# Patient Record
Sex: FEMALE | Race: WHITE | Employment: UNEMPLOYED | URBAN - METROPOLITAN AREA
[De-identification: names, ages, dates, MRNs, and addresses within clinical notes are randomized per-mention and may not be internally consistent; named-entity substitution may affect disease eponyms.]

---

## 2017-01-06 ENCOUNTER — TELEPHONE (OUTPATIENT)
Dept: PULMONOLOGY | Age: 52
End: 2017-01-06

## 2017-01-12 ENCOUNTER — HOSPITAL ENCOUNTER (OUTPATIENT)
Dept: NON INVASIVE DIAGNOSTICS | Age: 52
Discharge: HOME | End: 2017-01-12
Attending: FAMILY MEDICINE | Admitting: FAMILY MEDICINE

## 2017-01-12 DIAGNOSIS — R60.0 LOCALIZED EDEMA: ICD-10-CM

## 2017-01-12 LAB
LV EF: 58 %
LVEF MODALITY: NORMAL

## 2017-02-01 RX ORDER — ALBUTEROL SULFATE 2.5 MG/3ML
SOLUTION RESPIRATORY (INHALATION)
Refills: 0 | OUTPATIENT
Start: 2017-02-01

## 2017-02-03 RX ORDER — ALBUTEROL SULFATE 2.5 MG/3ML
SOLUTION RESPIRATORY (INHALATION)
Qty: 375 ML | Refills: 5 | Status: SHIPPED | OUTPATIENT
Start: 2017-02-03 | End: 2017-06-08

## 2017-03-08 ENCOUNTER — TELEPHONE (OUTPATIENT)
Dept: PULMONOLOGY | Age: 52
End: 2017-03-08

## 2017-04-05 ENCOUNTER — TELEPHONE (OUTPATIENT)
Dept: PULMONOLOGY | Age: 52
End: 2017-04-05

## 2017-04-05 DIAGNOSIS — J44.9 CHRONIC OBSTRUCTIVE PULMONARY DISEASE, UNSPECIFIED COPD TYPE (HCC): Primary | ICD-10-CM

## 2017-06-08 ENCOUNTER — OFFICE VISIT (OUTPATIENT)
Dept: PULMONOLOGY | Age: 52
End: 2017-06-08

## 2017-06-08 VITALS
TEMPERATURE: 98 F | RESPIRATION RATE: 16 BRPM | BODY MASS INDEX: 37.89 KG/M2 | OXYGEN SATURATION: 96 % | DIASTOLIC BLOOD PRESSURE: 68 MMHG | SYSTOLIC BLOOD PRESSURE: 124 MMHG | HEART RATE: 59 BPM | HEIGHT: 67 IN | WEIGHT: 241.4 LBS

## 2017-06-08 DIAGNOSIS — Z87.891 FORMER SMOKER: ICD-10-CM

## 2017-06-08 DIAGNOSIS — J44.9 MODERATE COPD (CHRONIC OBSTRUCTIVE PULMONARY DISEASE) (HCC): Primary | ICD-10-CM

## 2017-06-08 PROCEDURE — 99214 OFFICE O/P EST MOD 30 MIN: CPT | Performed by: INTERNAL MEDICINE

## 2017-06-08 RX ORDER — ALBUTEROL SULFATE 2.5 MG/3ML
2.5 SOLUTION RESPIRATORY (INHALATION) EVERY 6 HOURS PRN
Qty: 120 VIAL | Refills: 6 | Status: SHIPPED | OUTPATIENT
Start: 2017-06-08 | End: 2017-12-14 | Stop reason: SDUPTHER

## 2017-06-08 RX ORDER — BUDESONIDE AND FORMOTEROL FUMARATE DIHYDRATE 160; 4.5 UG/1; UG/1
AEROSOL RESPIRATORY (INHALATION)
Qty: 1 INHALER | Refills: 6 | Status: SHIPPED | OUTPATIENT
Start: 2017-06-08 | End: 2017-12-14 | Stop reason: SDUPTHER

## 2017-06-08 RX ORDER — ALBUTEROL SULFATE 90 UG/1
AEROSOL, METERED RESPIRATORY (INHALATION)
Qty: 1 INHALER | Refills: 6 | Status: SHIPPED | OUTPATIENT
Start: 2017-06-08 | End: 2018-01-30 | Stop reason: SDUPTHER

## 2017-06-08 RX ORDER — FLUTICASONE PROPIONATE 50 MCG
SPRAY, SUSPENSION (ML) NASAL
Qty: 1 BOTTLE | Refills: 6 | Status: SHIPPED | OUTPATIENT
Start: 2017-06-08 | End: 2017-11-01

## 2017-06-08 RX ORDER — MONTELUKAST SODIUM 10 MG/1
TABLET ORAL
Qty: 30 TABLET | Refills: 6 | Status: SHIPPED | OUTPATIENT
Start: 2017-06-08 | End: 2017-12-14 | Stop reason: SDUPTHER

## 2017-06-08 RX ORDER — LORATADINE 10 MG/1
10 TABLET ORAL DAILY
COMMUNITY
Start: 2017-06-03 | End: 2019-01-10 | Stop reason: SDUPTHER

## 2017-10-04 RX ORDER — ALBUTEROL SULFATE 2.5 MG/3ML
SOLUTION RESPIRATORY (INHALATION)
Qty: 275 ML | Refills: 1 | Status: SHIPPED | OUTPATIENT
Start: 2017-10-04 | End: 2017-12-03 | Stop reason: SDUPTHER

## 2017-11-01 RX ORDER — FLUTICASONE PROPIONATE 50 MCG
SPRAY, SUSPENSION (ML) NASAL
Qty: 1 BOTTLE | Refills: 5 | Status: SHIPPED | OUTPATIENT
Start: 2017-11-01 | End: 2017-12-14

## 2017-12-03 DIAGNOSIS — J44.9 CHRONIC OBSTRUCTIVE PULMONARY DISEASE, UNSPECIFIED COPD TYPE (HCC): Primary | ICD-10-CM

## 2017-12-04 RX ORDER — ALBUTEROL SULFATE 2.5 MG/3ML
SOLUTION RESPIRATORY (INHALATION)
Qty: 360 ML | Refills: 1 | Status: SHIPPED | OUTPATIENT
Start: 2017-12-04 | End: 2017-12-14 | Stop reason: SDUPTHER

## 2017-12-05 ENCOUNTER — HOSPITAL ENCOUNTER (OUTPATIENT)
Dept: PULMONOLOGY | Age: 52
Discharge: HOME | End: 2017-12-05
Attending: INTERNAL MEDICINE | Admitting: INTERNAL MEDICINE

## 2017-12-05 VITALS — OXYGEN SATURATION: 96 %

## 2017-12-05 DIAGNOSIS — J44.9 CHRONIC OBSTRUCTIVE PULMONARY DISEASE (HCC): ICD-10-CM

## 2017-12-05 RX ORDER — ALBUTEROL SULFATE 90 UG/1
4 AEROSOL, METERED RESPIRATORY (INHALATION) ONCE
Status: COMPLETED | OUTPATIENT
Start: 2017-12-05 | End: 2017-12-05

## 2017-12-05 RX ADMIN — ALBUTEROL SULFATE 4 PUFF: 90 AEROSOL, METERED RESPIRATORY (INHALATION) at 12:50

## 2017-12-14 ENCOUNTER — OFFICE VISIT (OUTPATIENT)
Dept: PULMONOLOGY | Age: 52
End: 2017-12-14

## 2017-12-14 VITALS
HEART RATE: 62 BPM | SYSTOLIC BLOOD PRESSURE: 108 MMHG | BODY MASS INDEX: 38.52 KG/M2 | HEIGHT: 67 IN | TEMPERATURE: 97.9 F | OXYGEN SATURATION: 96 % | DIASTOLIC BLOOD PRESSURE: 60 MMHG | RESPIRATION RATE: 20 BRPM | WEIGHT: 245.4 LBS

## 2017-12-14 DIAGNOSIS — J44.9 CHRONIC OBSTRUCTIVE PULMONARY DISEASE, UNSPECIFIED COPD TYPE (HCC): Primary | ICD-10-CM

## 2017-12-14 DIAGNOSIS — J45.909 UNCOMPLICATED ASTHMA, UNSPECIFIED ASTHMA SEVERITY, UNSPECIFIED WHETHER PERSISTENT: ICD-10-CM

## 2017-12-14 DIAGNOSIS — J30.9 ALLERGIC RHINITIS, UNSPECIFIED CHRONICITY, UNSPECIFIED SEASONALITY, UNSPECIFIED TRIGGER: ICD-10-CM

## 2017-12-14 PROCEDURE — 99214 OFFICE O/P EST MOD 30 MIN: CPT | Performed by: INTERNAL MEDICINE

## 2017-12-14 PROCEDURE — G8926 SPIRO NO PERF OR DOC: HCPCS | Performed by: INTERNAL MEDICINE

## 2017-12-14 PROCEDURE — 3017F COLORECTAL CA SCREEN DOC REV: CPT | Performed by: INTERNAL MEDICINE

## 2017-12-14 PROCEDURE — 3023F SPIROM DOC REV: CPT | Performed by: INTERNAL MEDICINE

## 2017-12-14 PROCEDURE — 1036F TOBACCO NON-USER: CPT | Performed by: INTERNAL MEDICINE

## 2017-12-14 PROCEDURE — G8417 CALC BMI ABV UP PARAM F/U: HCPCS | Performed by: INTERNAL MEDICINE

## 2017-12-14 PROCEDURE — G8427 DOCREV CUR MEDS BY ELIG CLIN: HCPCS | Performed by: INTERNAL MEDICINE

## 2017-12-14 PROCEDURE — G8484 FLU IMMUNIZE NO ADMIN: HCPCS | Performed by: INTERNAL MEDICINE

## 2017-12-14 RX ORDER — MONTELUKAST SODIUM 10 MG/1
TABLET ORAL
Qty: 30 TABLET | Refills: 6 | Status: SHIPPED | OUTPATIENT
Start: 2017-12-14 | End: 2018-01-30 | Stop reason: SDUPTHER

## 2017-12-14 RX ORDER — FLUTICASONE PROPIONATE 50 MCG
2 SPRAY, SUSPENSION (ML) NASAL DAILY
Qty: 1 BOTTLE | Refills: 5 | Status: SHIPPED | OUTPATIENT
Start: 2017-12-14 | End: 2018-04-28 | Stop reason: SDUPTHER

## 2017-12-14 RX ORDER — BUDESONIDE AND FORMOTEROL FUMARATE DIHYDRATE 160; 4.5 UG/1; UG/1
AEROSOL RESPIRATORY (INHALATION)
Qty: 1 INHALER | Refills: 6 | Status: SHIPPED | OUTPATIENT
Start: 2017-12-14 | End: 2018-01-30 | Stop reason: SDUPTHER

## 2017-12-14 RX ORDER — CHOLECALCIFEROL (VITAMIN D3) 1250 MCG
CAPSULE ORAL
COMMUNITY
End: 2018-06-27

## 2017-12-14 RX ORDER — ALBUTEROL SULFATE 2.5 MG/3ML
2.5 SOLUTION RESPIRATORY (INHALATION) EVERY 6 HOURS PRN
Qty: 120 VIAL | Refills: 6 | Status: SHIPPED | OUTPATIENT
Start: 2017-12-14 | End: 2018-01-30 | Stop reason: SDUPTHER

## 2017-12-14 NOTE — PATIENT INSTRUCTIONS
Please keep all of your future appointments scheduled by [address], [facility] Pulmonary office.

## 2018-01-29 RX ORDER — BUDESONIDE AND FORMOTEROL FUMARATE DIHYDRATE 160; 4.5 UG/1; UG/1
AEROSOL RESPIRATORY (INHALATION)
Qty: 10.2 G | Refills: 5 | OUTPATIENT
Start: 2018-01-29

## 2018-01-30 RX ORDER — MONTELUKAST SODIUM 10 MG/1
TABLET ORAL
Qty: 30 TABLET | Refills: 5 | Status: SHIPPED | OUTPATIENT
Start: 2018-01-30 | End: 2018-06-27 | Stop reason: SDUPTHER

## 2018-01-30 RX ORDER — ALBUTEROL SULFATE 2.5 MG/3ML
2.5 SOLUTION RESPIRATORY (INHALATION) EVERY 6 HOURS PRN
Qty: 125 VIAL | Refills: 5 | Status: SHIPPED | OUTPATIENT
Start: 2018-01-30 | End: 2018-06-27 | Stop reason: SDUPTHER

## 2018-01-30 RX ORDER — BUDESONIDE AND FORMOTEROL FUMARATE DIHYDRATE 160; 4.5 UG/1; UG/1
AEROSOL RESPIRATORY (INHALATION)
Qty: 1 INHALER | Refills: 5 | Status: SHIPPED | OUTPATIENT
Start: 2018-01-30 | End: 2018-06-27 | Stop reason: SDUPTHER

## 2018-01-30 RX ORDER — ALBUTEROL SULFATE 90 UG/1
AEROSOL, METERED RESPIRATORY (INHALATION)
Qty: 1 INHALER | Refills: 5 | Status: SHIPPED | OUTPATIENT
Start: 2018-01-30 | End: 2018-06-27 | Stop reason: SDUPTHER

## 2018-03-01 RX ORDER — BUDESONIDE AND FORMOTEROL FUMARATE DIHYDRATE 160; 4.5 UG/1; UG/1
AEROSOL RESPIRATORY (INHALATION)
Qty: 10.2 G | Refills: 0 | OUTPATIENT
Start: 2018-03-01

## 2018-04-30 RX ORDER — FLUTICASONE PROPIONATE 50 MCG
SPRAY, SUSPENSION (ML) NASAL
Qty: 16 G | Refills: 2 | Status: SHIPPED | OUTPATIENT
Start: 2018-04-30 | End: 2018-06-27 | Stop reason: SDUPTHER

## 2018-06-27 ENCOUNTER — HOSPITAL ENCOUNTER (OUTPATIENT)
Dept: OTHER | Age: 53
Discharge: HOME | End: 2018-06-27
Attending: INTERNAL MEDICINE | Admitting: INTERNAL MEDICINE

## 2018-06-27 ENCOUNTER — OFFICE VISIT (OUTPATIENT)
Dept: PULMONOLOGY | Age: 53
End: 2018-06-27

## 2018-06-27 VITALS
RESPIRATION RATE: 18 BRPM | WEIGHT: 244 LBS | HEIGHT: 67 IN | OXYGEN SATURATION: 96 % | DIASTOLIC BLOOD PRESSURE: 76 MMHG | SYSTOLIC BLOOD PRESSURE: 126 MMHG | HEART RATE: 54 BPM | TEMPERATURE: 97.7 F | BODY MASS INDEX: 38.3 KG/M2

## 2018-06-27 DIAGNOSIS — J44.9 CHRONIC OBSTRUCTIVE PULMONARY DISEASE, UNSPECIFIED COPD TYPE (HCC): Primary | ICD-10-CM

## 2018-06-27 DIAGNOSIS — J30.9 ALLERGIC RHINITIS, UNSPECIFIED CHRONICITY, UNSPECIFIED SEASONALITY, UNSPECIFIED TRIGGER: ICD-10-CM

## 2018-06-27 DIAGNOSIS — J45.909 ASTHMA, UNSPECIFIED ASTHMA SEVERITY, UNSPECIFIED WHETHER COMPLICATED, UNSPECIFIED WHETHER PERSISTENT: ICD-10-CM

## 2018-06-27 DIAGNOSIS — J44.9 CHRONIC OBSTRUCTIVE PULMONARY DISEASE, UNSPECIFIED COPD TYPE (HCC): ICD-10-CM

## 2018-06-27 PROCEDURE — 99214 OFFICE O/P EST MOD 30 MIN: CPT | Performed by: INTERNAL MEDICINE

## 2018-06-27 PROCEDURE — 1036F TOBACCO NON-USER: CPT | Performed by: INTERNAL MEDICINE

## 2018-06-27 PROCEDURE — 3023F SPIROM DOC REV: CPT | Performed by: INTERNAL MEDICINE

## 2018-06-27 PROCEDURE — G8417 CALC BMI ABV UP PARAM F/U: HCPCS | Performed by: INTERNAL MEDICINE

## 2018-06-27 PROCEDURE — G8926 SPIRO NO PERF OR DOC: HCPCS | Performed by: INTERNAL MEDICINE

## 2018-06-27 PROCEDURE — G8427 DOCREV CUR MEDS BY ELIG CLIN: HCPCS | Performed by: INTERNAL MEDICINE

## 2018-06-27 PROCEDURE — 3017F COLORECTAL CA SCREEN DOC REV: CPT | Performed by: INTERNAL MEDICINE

## 2018-06-27 RX ORDER — AZITHROMYCIN 250 MG/1
TABLET, FILM COATED ORAL
Qty: 1 PACKET | Refills: 0 | Status: SHIPPED | OUTPATIENT
Start: 2018-06-27 | End: 2018-07-01

## 2018-06-27 RX ORDER — RANITIDINE 150 MG/1
TABLET ORAL
Refills: 3 | COMMUNITY
Start: 2018-05-28 | End: 2020-09-22

## 2018-06-27 RX ORDER — ALBUTEROL SULFATE 2.5 MG/3ML
2.5 SOLUTION RESPIRATORY (INHALATION) EVERY 6 HOURS PRN
Qty: 125 VIAL | Refills: 6 | Status: SHIPPED | OUTPATIENT
Start: 2018-06-27 | End: 2019-03-30 | Stop reason: SDUPTHER

## 2018-06-27 RX ORDER — BUDESONIDE AND FORMOTEROL FUMARATE DIHYDRATE 160; 4.5 UG/1; UG/1
AEROSOL RESPIRATORY (INHALATION)
Qty: 1 INHALER | Refills: 6 | Status: SHIPPED | OUTPATIENT
Start: 2018-06-27 | End: 2019-01-10 | Stop reason: SDUPTHER

## 2018-06-27 RX ORDER — CHOLECALCIFEROL (VITAMIN D3) 50 MCG
TABLET ORAL
Refills: 3 | COMMUNITY
Start: 2018-06-08 | End: 2019-11-08

## 2018-06-27 RX ORDER — PREDNISONE 10 MG/1
TABLET ORAL
Qty: 30 TABLET | Refills: 0 | Status: SHIPPED | OUTPATIENT
Start: 2018-06-27 | End: 2018-07-07

## 2018-06-27 RX ORDER — ALBUTEROL SULFATE 90 UG/1
AEROSOL, METERED RESPIRATORY (INHALATION)
Qty: 1 INHALER | Refills: 6 | Status: SHIPPED | OUTPATIENT
Start: 2018-06-27 | End: 2019-01-10 | Stop reason: SDUPTHER

## 2018-06-27 RX ORDER — FLUTICASONE PROPIONATE 50 MCG
SPRAY, SUSPENSION (ML) NASAL
Qty: 1 BOTTLE | Refills: 6 | Status: SHIPPED | OUTPATIENT
Start: 2018-06-27 | End: 2019-01-10 | Stop reason: SDUPTHER

## 2018-06-27 RX ORDER — MONTELUKAST SODIUM 10 MG/1
TABLET ORAL
Qty: 30 TABLET | Refills: 6 | Status: SHIPPED | OUTPATIENT
Start: 2018-06-27 | End: 2019-01-10 | Stop reason: SDUPTHER

## 2018-10-08 ENCOUNTER — HOSPITAL ENCOUNTER (EMERGENCY)
Age: 53
Discharge: HOME | End: 2018-10-08
Payer: COMMERCIAL

## 2018-10-08 VITALS
SYSTOLIC BLOOD PRESSURE: 105 MMHG | BODY MASS INDEX: 36.1 KG/M2 | DIASTOLIC BLOOD PRESSURE: 105 MMHG | RESPIRATION RATE: 16 BRPM | HEART RATE: 75 BPM | HEIGHT: 67 IN | TEMPERATURE: 97.7 F | WEIGHT: 230 LBS | OXYGEN SATURATION: 93 %

## 2018-10-08 DIAGNOSIS — K05.00 ACUTE GINGIVITIS: ICD-10-CM

## 2018-10-08 DIAGNOSIS — K02.9 DENTAL DECAY: ICD-10-CM

## 2018-10-08 DIAGNOSIS — R68.84 JAW PAIN: Primary | ICD-10-CM

## 2018-10-08 PROCEDURE — 6360000002 HC RX W HCPCS: Performed by: PHYSICIAN ASSISTANT

## 2018-10-08 PROCEDURE — 99282 EMERGENCY DEPT VISIT SF MDM: CPT

## 2018-10-08 PROCEDURE — 6370000000 HC RX 637 (ALT 250 FOR IP): Performed by: PHYSICIAN ASSISTANT

## 2018-10-08 RX ORDER — ONDANSETRON 4 MG/1
4 TABLET, FILM COATED ORAL EVERY 8 HOURS PRN
Qty: 20 TABLET | Refills: 0 | Status: SHIPPED | OUTPATIENT
Start: 2018-10-08 | End: 2019-11-08

## 2018-10-08 RX ORDER — AMOXICILLIN 250 MG/1
250 CAPSULE ORAL ONCE
Status: COMPLETED | OUTPATIENT
Start: 2018-10-08 | End: 2018-10-08

## 2018-10-08 RX ORDER — ONDANSETRON 4 MG/1
4 TABLET, ORALLY DISINTEGRATING ORAL ONCE
Status: COMPLETED | OUTPATIENT
Start: 2018-10-08 | End: 2018-10-08

## 2018-10-08 RX ORDER — HYDROCODONE BITARTRATE AND ACETAMINOPHEN 5; 325 MG/1; MG/1
1 TABLET ORAL ONCE
Status: COMPLETED | OUTPATIENT
Start: 2018-10-08 | End: 2018-10-08

## 2018-10-08 RX ORDER — HYDROCODONE BITARTRATE AND ACETAMINOPHEN 5; 325 MG/1; MG/1
1 TABLET ORAL EVERY 6 HOURS PRN
Qty: 10 TABLET | Refills: 0 | Status: SHIPPED | OUTPATIENT
Start: 2018-10-08 | End: 2018-10-15

## 2018-10-08 RX ORDER — AMOXICILLIN 500 MG/1
500 CAPSULE ORAL 3 TIMES DAILY
Qty: 30 CAPSULE | Refills: 0 | Status: SHIPPED | OUTPATIENT
Start: 2018-10-08 | End: 2018-10-18

## 2018-10-08 RX ADMIN — HYDROCODONE BITARTRATE AND ACETAMINOPHEN 1 TABLET: 5; 325 TABLET ORAL at 18:24

## 2018-10-08 RX ADMIN — ONDANSETRON 4 MG: 4 TABLET, ORALLY DISINTEGRATING ORAL at 18:24

## 2018-10-08 RX ADMIN — AMOXICILLIN 250 MG: 250 CAPSULE ORAL at 18:24

## 2018-10-08 ASSESSMENT — ENCOUNTER SYMPTOMS
DIARRHEA: 0
RHINORRHEA: 0
COUGH: 0
BACK PAIN: 0
SORE THROAT: 0
FACIAL SWELLING: 0
CHEST TIGHTNESS: 0
CONSTIPATION: 0
EYE REDNESS: 0
ABDOMINAL PAIN: 0
NAUSEA: 0
EYE PAIN: 0
SHORTNESS OF BREATH: 0

## 2018-10-08 ASSESSMENT — PAIN SCALES - GENERAL
PAINLEVEL_OUTOF10: 10
PAINLEVEL_OUTOF10: 10

## 2018-10-08 ASSESSMENT — PAIN DESCRIPTION - LOCATION: LOCATION: TEETH

## 2018-10-08 ASSESSMENT — PAIN DESCRIPTION - PAIN TYPE: TYPE: ACUTE PAIN

## 2018-10-18 ENCOUNTER — TELEPHONE (OUTPATIENT)
Dept: PULMONOLOGY | Age: 53
End: 2018-10-18

## 2019-01-10 ENCOUNTER — OFFICE VISIT (OUTPATIENT)
Dept: PULMONOLOGY | Age: 54
End: 2019-01-10
Payer: COMMERCIAL

## 2019-01-10 VITALS
OXYGEN SATURATION: 97 % | DIASTOLIC BLOOD PRESSURE: 70 MMHG | TEMPERATURE: 97.5 F | HEART RATE: 60 BPM | HEIGHT: 67 IN | SYSTOLIC BLOOD PRESSURE: 116 MMHG | BODY MASS INDEX: 37.92 KG/M2 | WEIGHT: 241.6 LBS | RESPIRATION RATE: 18 BRPM

## 2019-01-10 DIAGNOSIS — J44.9 MODERATE COPD (CHRONIC OBSTRUCTIVE PULMONARY DISEASE) (HCC): ICD-10-CM

## 2019-01-10 DIAGNOSIS — J01.90 ACUTE SINUSITIS, RECURRENCE NOT SPECIFIED, UNSPECIFIED LOCATION: Primary | ICD-10-CM

## 2019-01-10 DIAGNOSIS — J30.9 ALLERGIC RHINITIS, UNSPECIFIED SEASONALITY, UNSPECIFIED TRIGGER: ICD-10-CM

## 2019-01-10 PROCEDURE — 99214 OFFICE O/P EST MOD 30 MIN: CPT | Performed by: INTERNAL MEDICINE

## 2019-01-10 PROCEDURE — 3017F COLORECTAL CA SCREEN DOC REV: CPT | Performed by: INTERNAL MEDICINE

## 2019-01-10 PROCEDURE — G8417 CALC BMI ABV UP PARAM F/U: HCPCS | Performed by: INTERNAL MEDICINE

## 2019-01-10 PROCEDURE — 1036F TOBACCO NON-USER: CPT | Performed by: INTERNAL MEDICINE

## 2019-01-10 PROCEDURE — G8427 DOCREV CUR MEDS BY ELIG CLIN: HCPCS | Performed by: INTERNAL MEDICINE

## 2019-01-10 PROCEDURE — G8926 SPIRO NO PERF OR DOC: HCPCS | Performed by: INTERNAL MEDICINE

## 2019-01-10 PROCEDURE — G8484 FLU IMMUNIZE NO ADMIN: HCPCS | Performed by: INTERNAL MEDICINE

## 2019-01-10 PROCEDURE — 3023F SPIROM DOC REV: CPT | Performed by: INTERNAL MEDICINE

## 2019-01-10 RX ORDER — BUPROPION HYDROCHLORIDE 100 MG/1
100 TABLET ORAL 2 TIMES DAILY
COMMUNITY
End: 2021-05-06

## 2019-01-10 RX ORDER — LORATADINE 10 MG/1
10 TABLET ORAL DAILY
Qty: 30 TABLET | Refills: 6 | Status: SHIPPED | OUTPATIENT
Start: 2019-01-10 | End: 2019-07-23 | Stop reason: SDUPTHER

## 2019-01-10 RX ORDER — OXYMETAZOLINE HYDROCHLORIDE 0.05 G/100ML
2 SPRAY NASAL 2 TIMES DAILY
Qty: 20 ML | Refills: 0 | Status: SHIPPED | OUTPATIENT
Start: 2019-01-10 | End: 2019-07-23

## 2019-01-10 RX ORDER — FLUTICASONE PROPIONATE 50 MCG
SPRAY, SUSPENSION (ML) NASAL
Qty: 1 BOTTLE | Refills: 6 | Status: SHIPPED | OUTPATIENT
Start: 2019-01-10 | End: 2019-09-29 | Stop reason: SDUPTHER

## 2019-01-10 RX ORDER — BUDESONIDE AND FORMOTEROL FUMARATE DIHYDRATE 160; 4.5 UG/1; UG/1
AEROSOL RESPIRATORY (INHALATION)
Qty: 1 INHALER | Refills: 6 | Status: SHIPPED | OUTPATIENT
Start: 2019-01-10 | End: 2019-03-01

## 2019-01-10 RX ORDER — AZITHROMYCIN 500 MG/1
500 TABLET, FILM COATED ORAL DAILY
Qty: 5 TABLET | Refills: 0 | Status: SHIPPED | OUTPATIENT
Start: 2019-01-10 | End: 2019-01-15

## 2019-01-10 RX ORDER — ALBUTEROL SULFATE 90 UG/1
AEROSOL, METERED RESPIRATORY (INHALATION)
Qty: 1 INHALER | Refills: 6 | Status: SHIPPED | OUTPATIENT
Start: 2019-01-10 | End: 2019-07-23 | Stop reason: SDUPTHER

## 2019-01-10 RX ORDER — MONTELUKAST SODIUM 10 MG/1
TABLET ORAL
Qty: 30 TABLET | Refills: 6 | Status: SHIPPED | OUTPATIENT
Start: 2019-01-10 | End: 2019-07-23 | Stop reason: SDUPTHER

## 2019-03-01 RX ORDER — BUDESONIDE AND FORMOTEROL FUMARATE DIHYDRATE 160; 4.5 UG/1; UG/1
AEROSOL RESPIRATORY (INHALATION)
Qty: 10.2 G | Refills: 5 | Status: SHIPPED | OUTPATIENT
Start: 2019-03-01 | End: 2019-07-23 | Stop reason: SDUPTHER

## 2019-04-01 RX ORDER — ALBUTEROL SULFATE 2.5 MG/3ML
SOLUTION RESPIRATORY (INHALATION)
Qty: 375 ML | Refills: 5 | Status: SHIPPED | OUTPATIENT
Start: 2019-04-01 | End: 2019-10-01 | Stop reason: SDUPTHER

## 2019-05-21 ENCOUNTER — TELEPHONE (OUTPATIENT)
Dept: PULMONOLOGY | Age: 54
End: 2019-05-21

## 2019-06-20 ENCOUNTER — HOSPITAL ENCOUNTER (OUTPATIENT)
Dept: WOMENS IMAGING | Age: 54
Discharge: HOME | End: 2019-06-20
Payer: COMMERCIAL

## 2019-06-20 DIAGNOSIS — Z12.39 BREAST CANCER SCREENING: ICD-10-CM

## 2019-06-20 PROCEDURE — 77063 BREAST TOMOSYNTHESIS BI: CPT

## 2019-07-13 ENCOUNTER — HOSPITAL ENCOUNTER (EMERGENCY)
Age: 54
Discharge: HOME | End: 2019-07-13
Payer: COMMERCIAL

## 2019-07-13 VITALS
TEMPERATURE: 97.6 F | DIASTOLIC BLOOD PRESSURE: 77 MMHG | WEIGHT: 220 LBS | HEIGHT: 67 IN | OXYGEN SATURATION: 97 % | BODY MASS INDEX: 34.53 KG/M2 | RESPIRATION RATE: 18 BRPM | SYSTOLIC BLOOD PRESSURE: 134 MMHG | HEART RATE: 69 BPM

## 2019-07-13 DIAGNOSIS — H65.01 NON-RECURRENT ACUTE SEROUS OTITIS MEDIA OF RIGHT EAR: Primary | ICD-10-CM

## 2019-07-13 PROCEDURE — 6370000000 HC RX 637 (ALT 250 FOR IP): Performed by: NURSE PRACTITIONER

## 2019-07-13 PROCEDURE — 99282 EMERGENCY DEPT VISIT SF MDM: CPT

## 2019-07-13 PROCEDURE — 6370000000 HC RX 637 (ALT 250 FOR IP)

## 2019-07-13 RX ORDER — MECLIZINE HYDROCHLORIDE 25 MG/1
25 TABLET ORAL 3 TIMES DAILY PRN
Qty: 30 TABLET | Refills: 0 | Status: SHIPPED | OUTPATIENT
Start: 2019-07-13 | End: 2019-07-23

## 2019-07-13 RX ORDER — CETIRIZINE HYDROCHLORIDE 10 MG/1
10 TABLET ORAL DAILY
Qty: 30 TABLET | Refills: 0 | Status: SHIPPED | OUTPATIENT
Start: 2019-07-13 | End: 2019-08-12

## 2019-07-13 RX ORDER — CETIRIZINE HYDROCHLORIDE 10 MG/1
TABLET ORAL
Status: COMPLETED
Start: 2019-07-13 | End: 2019-07-13

## 2019-07-13 RX ORDER — MECLIZINE HYDROCHLORIDE CHEWABLE TABLETS 25 MG/1
25 TABLET, CHEWABLE ORAL ONCE
Status: COMPLETED | OUTPATIENT
Start: 2019-07-13 | End: 2019-07-13

## 2019-07-13 RX ORDER — CETIRIZINE HYDROCHLORIDE 10 MG/1
10 TABLET ORAL DAILY
Status: DISCONTINUED | OUTPATIENT
Start: 2019-07-14 | End: 2019-07-14 | Stop reason: HOSPADM

## 2019-07-13 RX ADMIN — CETIRIZINE HYDROCHLORIDE 10 MG: 10 TABLET ORAL at 23:44

## 2019-07-13 RX ADMIN — MECLIZINE HCL 25 MG: 25 TABLET, CHEWABLE ORAL at 23:30

## 2019-07-13 ASSESSMENT — ENCOUNTER SYMPTOMS
SHORTNESS OF BREATH: 0
ABDOMINAL PAIN: 0

## 2019-07-13 ASSESSMENT — PAIN DESCRIPTION - LOCATION: LOCATION: EAR

## 2019-07-13 ASSESSMENT — PAIN DESCRIPTION - DESCRIPTORS: DESCRIPTORS: PRESSURE

## 2019-07-13 ASSESSMENT — PAIN DESCRIPTION - ORIENTATION: ORIENTATION: RIGHT

## 2019-07-23 ENCOUNTER — OFFICE VISIT (OUTPATIENT)
Dept: PULMONOLOGY | Age: 54
End: 2019-07-23
Payer: COMMERCIAL

## 2019-07-23 VITALS
BODY MASS INDEX: 37.67 KG/M2 | OXYGEN SATURATION: 98 % | SYSTOLIC BLOOD PRESSURE: 124 MMHG | RESPIRATION RATE: 22 BRPM | HEART RATE: 78 BPM | WEIGHT: 240 LBS | TEMPERATURE: 97.5 F | HEIGHT: 67 IN | DIASTOLIC BLOOD PRESSURE: 62 MMHG

## 2019-07-23 DIAGNOSIS — J30.9 ALLERGIC RHINITIS, UNSPECIFIED SEASONALITY, UNSPECIFIED TRIGGER: ICD-10-CM

## 2019-07-23 DIAGNOSIS — Z87.891 PERSONAL HISTORY OF TOBACCO USE: ICD-10-CM

## 2019-07-23 DIAGNOSIS — J44.9 MODERATE COPD (CHRONIC OBSTRUCTIVE PULMONARY DISEASE) (HCC): Primary | ICD-10-CM

## 2019-07-23 PROCEDURE — G8926 SPIRO NO PERF OR DOC: HCPCS | Performed by: INTERNAL MEDICINE

## 2019-07-23 PROCEDURE — G8417 CALC BMI ABV UP PARAM F/U: HCPCS | Performed by: INTERNAL MEDICINE

## 2019-07-23 PROCEDURE — 3023F SPIROM DOC REV: CPT | Performed by: INTERNAL MEDICINE

## 2019-07-23 PROCEDURE — 99214 OFFICE O/P EST MOD 30 MIN: CPT | Performed by: INTERNAL MEDICINE

## 2019-07-23 PROCEDURE — 1036F TOBACCO NON-USER: CPT | Performed by: INTERNAL MEDICINE

## 2019-07-23 PROCEDURE — G8427 DOCREV CUR MEDS BY ELIG CLIN: HCPCS | Performed by: INTERNAL MEDICINE

## 2019-07-23 PROCEDURE — 3017F COLORECTAL CA SCREEN DOC REV: CPT | Performed by: INTERNAL MEDICINE

## 2019-07-23 RX ORDER — BUDESONIDE AND FORMOTEROL FUMARATE DIHYDRATE 160; 4.5 UG/1; UG/1
AEROSOL RESPIRATORY (INHALATION)
Qty: 1 INHALER | Refills: 6 | Status: SHIPPED | OUTPATIENT
Start: 2019-07-23 | End: 2020-02-11 | Stop reason: SDUPTHER

## 2019-07-23 RX ORDER — PREDNISONE 10 MG/1
TABLET ORAL
Qty: 30 TABLET | Refills: 0 | Status: SHIPPED | OUTPATIENT
Start: 2019-07-23 | End: 2019-08-02

## 2019-07-23 RX ORDER — LORATADINE 10 MG/1
10 TABLET ORAL DAILY
Qty: 30 TABLET | Refills: 6 | Status: SHIPPED | OUTPATIENT
Start: 2019-07-23 | End: 2020-02-11 | Stop reason: SDUPTHER

## 2019-07-23 RX ORDER — OXYBUTYNIN CHLORIDE 5 MG/1
TABLET, EXTENDED RELEASE ORAL
Refills: 3 | COMMUNITY
Start: 2019-06-28

## 2019-07-23 RX ORDER — ALBUTEROL SULFATE 90 UG/1
AEROSOL, METERED RESPIRATORY (INHALATION)
Qty: 1 INHALER | Refills: 6 | Status: SHIPPED | OUTPATIENT
Start: 2019-07-23 | End: 2019-11-09 | Stop reason: CLARIF

## 2019-07-23 RX ORDER — MONTELUKAST SODIUM 10 MG/1
TABLET ORAL
Qty: 30 TABLET | Refills: 6 | Status: SHIPPED | OUTPATIENT
Start: 2019-07-23 | End: 2019-11-08

## 2019-07-23 RX ORDER — DOXYCYCLINE HYCLATE 100 MG
100 TABLET ORAL 2 TIMES DAILY
Qty: 10 TABLET | Refills: 0 | Status: SHIPPED | OUTPATIENT
Start: 2019-07-23 | End: 2019-07-28

## 2019-08-08 ENCOUNTER — HOSPITAL ENCOUNTER (OUTPATIENT)
Dept: PULMONOLOGY | Age: 54
Discharge: HOME | End: 2019-08-08
Payer: COMMERCIAL

## 2019-08-08 VITALS — OXYGEN SATURATION: 95 %

## 2019-08-08 DIAGNOSIS — J44.9 MODERATE COPD (CHRONIC OBSTRUCTIVE PULMONARY DISEASE) (HCC): ICD-10-CM

## 2019-08-08 LAB
DLCO %PRED: 102 %
DLCO PRED: NORMAL ML/MIN/MMHG
DLCO/VA %PRED: NORMAL %
DLCO/VA PRED: NORMAL ML/MIN/MMHG
DLCO/VA: NORMAL ML/MIN/MMHG
DLCO: NORMAL ML/MIN/MMHG
EXPIRATORY TIME-POST: NORMAL SEC
EXPIRATORY TIME: NORMAL SEC
FEF 25-75% %CHNG: NORMAL
FEF 25-75% %PRED-POST: NORMAL %
FEF 25-75% %PRED-PRE: NORMAL L/SEC
FEF 25-75% PRED: NORMAL L/SEC
FEF 25-75%-POST: NORMAL L/SEC
FEF 25-75%-PRE: NORMAL L/SEC
FEV1 %PRED-POST: 53 %
FEV1 %PRED-PRE: 51 %
FEV1 PRED: NORMAL L
FEV1-POST: NORMAL L
FEV1-PRE: NORMAL L
FEV1/FVC %PRED-POST: NORMAL %
FEV1/FVC %PRED-PRE: NORMAL %
FEV1/FVC PRED: NORMAL %
FEV1/FVC-POST: 53 %
FEV1/FVC-PRE: 53 %
FVC %PRED-POST: 79 L
FVC %PRED-PRE: 77 %
FVC PRED: NORMAL L
FVC-POST: NORMAL L
FVC-PRE: NORMAL L
GAW %PRED: NORMAL %
GAW PRED: NORMAL L/S/CMH2O
GAW: NORMAL L/S/CMH2O
IC %PRED: NORMAL %
IC PRED: NORMAL L
IC: NORMAL L
MEP: NORMAL
MIP: NORMAL
MVV %PRED-PRE: NORMAL %
MVV PRED: NORMAL L/MIN
MVV-PRE: NORMAL L/MIN
PEF %PRED-POST: NORMAL %
PEF %PRED-PRE: NORMAL L/SEC
PEF PRED: NORMAL L/SEC
PEF%CHNG: NORMAL
PEF-POST: NORMAL L/SEC
PEF-PRE: NORMAL L/SEC
RAW %PRED: NORMAL %
RAW PRED: NORMAL CMH2O/L/S
RAW: NORMAL CMH2O/L/S
RV %PRED: NORMAL %
RV PRED: NORMAL L
RV: NORMAL L
SVC %PRED: NORMAL %
SVC PRED: NORMAL L
SVC: NORMAL L
TLC %PRED: 95 %
TLC PRED: NORMAL L
TLC: NORMAL L
VA %PRED: NORMAL %
VA PRED: NORMAL L
VA: NORMAL L
VTG %PRED: NORMAL %
VTG PRED: NORMAL L
VTG: NORMAL L

## 2019-08-08 PROCEDURE — 94726 PLETHYSMOGRAPHY LUNG VOLUMES: CPT

## 2019-08-08 PROCEDURE — 94060 EVALUATION OF WHEEZING: CPT

## 2019-08-08 PROCEDURE — 94618 PULMONARY STRESS TESTING: CPT

## 2019-08-08 PROCEDURE — 94729 DIFFUSING CAPACITY: CPT

## 2019-08-08 PROCEDURE — 6370000000 HC RX 637 (ALT 250 FOR IP): Performed by: INTERNAL MEDICINE

## 2019-08-08 RX ORDER — ALBUTEROL SULFATE 90 UG/1
4 AEROSOL, METERED RESPIRATORY (INHALATION) ONCE
Status: COMPLETED | OUTPATIENT
Start: 2019-08-08 | End: 2019-08-08

## 2019-08-08 RX ADMIN — Medication 4 PUFF: at 13:44

## 2019-08-08 ASSESSMENT — PULMONARY FUNCTION TESTS
FEV1/FVC_POST: 53
FEV1/FVC_PRE: 53
FVC_PERCENT_PREDICTED_POST: 79
FVC_PERCENT_PREDICTED_PRE: 77
FEV1_PERCENT_PREDICTED_POST: 53
FEV1_PERCENT_PREDICTED_PRE: 51

## 2019-08-08 NOTE — PROGRESS NOTES
[address] [place] [address] Pulmonary Function Lab - Six Minute Walk      Test Performed on:   Room Air__X____   Oxygen at _____ lpm via N/C- continuous Oxygen at _____ lpm via N/C- OCD  Assist Device Used During Test:  None__X____ Cane________ Walker_________    Modified Fransisco's Scale  0 Nothing at all 5 Strong    0.5 Extremely Weak 6 Stronger (Hard)    1 Very weak 7 Very Strong   2 Weak (light) 8 Very Very Strong   3 Moderate 9 Extremely Strong   4 Somewhat Strong 10 Maximum All out      Time SpO2 Heart Rate Respiratory Rate Dyspnea-  Modified Fransiscos Scale Fatigue- Modified Fransiscos Scale Other Symptoms   Baseline                     99% 53 16 0 0      1 minute                     89% 74 18 0 0    2 minutes                     89% 78 18 0 0   Paused to get reading on pulse ox to    3 minutes                     91% 75 20 0 0    4 minutes                     90% 80 20 0 0    5 minutes                     91% 79 22 0 0    6 minutes                     92% 79 22 0 0    Recovery x 1 minute                     93% 80 20 0 0    Recovery x 2 Minute                     94% 61 18 0 0     Number of Laps____10__ X 120 feet + _________ additional feet = Total Distance _1200__feet   Stopped or paused before 6 minutes? No_______ Yes __X______  Total expected 6 MW distance is ___1650__feet. Patient achieved ____73_% of expected distance.    Pre Blood Pressure: 96/62  Post Blood Pressure: 116/38  Other symptoms at the end of exercise:

## 2019-08-09 NOTE — PROCEDURES
[address] [address]                               PULMONARY FUNCTION    PATIENT NAME: Satya Neely                      :        1965  MED REC NO:   [id number]                          ROOM:  ACCOUNT NO:   [de-identified]                           ADMIT DATE: 2019  PROVIDER:     Abel Sanchez MD    DATE OF PROCEDURE:  2019    PFT INTERPRETATION    The patient is a 82-year-old female who underwent a PFT for COPD. Spirometry shows FVC to be 77%, FEV1 to be 51%, FEV1 to FVC ratio was  66%, FEF 25-75% was 22%. The patient had minimal postbronchodilator  improvement in the small airways on the study. Lung volume shows the  total lung capacity to be normal.  The patient does not have any air  trapping or hyperinflation. The patient has decrease in ERV, which may  be because of body habitus. The patient's diffusion capacity when  adjusted for volume was normal.  The patient had a PFT done in 2017,  which has shown the patient have FVC of 83, FEV1 of 59%, and FEV1 to FVC  ratio was 71 at that time. On the basis of this PFT, the patient has  moderate-to-severe obstructive airway disease. Along with that, the  patient has some changes in the PFT parameters because of body habitus. The patient's PFT parameters in the form of FVC, FEV1, FEV1 to FVC ratio  has decreased as compared to 2017. Please correlate clinically. The  patient also underwent a six-minute walk test, which shows the patient  has baseline oxygen saturation of 99%, heart rate of 53, respiratory  rate of 16, dyspnea Modified Fransisco scale of 0, fatigue Modified Fransisco  scale of 0. The patient often dropped to 89% after two minutes of  walking and the patient did pause to get the reading on pulse ox and it  picked up and the patient did not have any exertional hypoxemia on the  study. The patient's total distance walked was 1200 feet.

## 2019-09-30 RX ORDER — FLUTICASONE PROPIONATE 50 MCG
SPRAY, SUSPENSION (ML) NASAL
Qty: 1 BOTTLE | Refills: 5 | Status: SHIPPED | OUTPATIENT
Start: 2019-09-30 | End: 2020-04-15 | Stop reason: SDUPTHER

## 2019-10-01 RX ORDER — ALBUTEROL SULFATE 2.5 MG/3ML
SOLUTION RESPIRATORY (INHALATION)
Qty: 375 ML | Refills: 5 | Status: SHIPPED | OUTPATIENT
Start: 2019-10-01 | End: 2019-11-09 | Stop reason: CLARIF

## 2019-11-08 ENCOUNTER — HOSPITAL ENCOUNTER (EMERGENCY)
Age: 54
Discharge: LEFT BEFORE DISCHARGE | End: 2019-11-09
Attending: EMERGENCY MEDICINE
Payer: COMMERCIAL

## 2019-11-08 ENCOUNTER — APPOINTMENT (OUTPATIENT)
Dept: GENERAL RADIOLOGY | Age: 54
End: 2019-11-08
Payer: COMMERCIAL

## 2019-11-08 VITALS
HEART RATE: 78 BPM | SYSTOLIC BLOOD PRESSURE: 127 MMHG | OXYGEN SATURATION: 91 % | RESPIRATION RATE: 18 BRPM | BODY MASS INDEX: 39.55 KG/M2 | WEIGHT: 252 LBS | TEMPERATURE: 98.4 F | HEIGHT: 67 IN | DIASTOLIC BLOOD PRESSURE: 74 MMHG

## 2019-11-08 DIAGNOSIS — M79.89 LEG SWELLING: Primary | ICD-10-CM

## 2019-11-08 DIAGNOSIS — R06.00 DYSPNEA, UNSPECIFIED TYPE: ICD-10-CM

## 2019-11-08 LAB
A/G RATIO: 1 (ref 1.1–2.2)
ALBUMIN SERPL-MCNC: 3.6 G/DL (ref 3.4–5)
ALP BLD-CCNC: 73 U/L (ref 40–129)
ALT SERPL-CCNC: 12 U/L (ref 10–40)
ANION GAP SERPL CALCULATED.3IONS-SCNC: 9 MMOL/L (ref 3–16)
AST SERPL-CCNC: 23 U/L (ref 15–37)
BACTERIA: ABNORMAL /HPF
BASOPHILS ABSOLUTE: 0 K/UL (ref 0–0.2)
BASOPHILS RELATIVE PERCENT: 0.2 %
BILIRUB SERPL-MCNC: 0.3 MG/DL (ref 0–1)
BILIRUBIN URINE: NEGATIVE
BLOOD, URINE: ABNORMAL
BUN BLDV-MCNC: 14 MG/DL (ref 7–20)
CALCIUM SERPL-MCNC: 9.4 MG/DL (ref 8.3–10.6)
CHLORIDE BLD-SCNC: 101 MMOL/L (ref 99–110)
CLARITY: ABNORMAL
CO2: 28 MMOL/L (ref 21–32)
COLOR: YELLOW
CREAT SERPL-MCNC: 0.9 MG/DL (ref 0.6–1.1)
D DIMER: 316 NG/ML DDU (ref 0–229)
EOSINOPHILS ABSOLUTE: 0.3 K/UL (ref 0–0.6)
EOSINOPHILS RELATIVE PERCENT: 2.6 %
EPITHELIAL CELLS, UA: ABNORMAL /HPF
GFR AFRICAN AMERICAN: >60
GFR NON-AFRICAN AMERICAN: >60
GLOBULIN: 3.7 G/DL
GLUCOSE BLD-MCNC: 123 MG/DL (ref 70–99)
GLUCOSE URINE: NEGATIVE MG/DL
HCT VFR BLD CALC: 43.6 % (ref 36–48)
HEMOGLOBIN: 14.7 G/DL (ref 12–16)
KETONES, URINE: NEGATIVE MG/DL
LEUKOCYTE ESTERASE, URINE: NEGATIVE
LYMPHOCYTES ABSOLUTE: 1.9 K/UL (ref 1–5.1)
LYMPHOCYTES RELATIVE PERCENT: 18.7 %
MCH RBC QN AUTO: 33.5 PG (ref 26–34)
MCHC RBC AUTO-ENTMCNC: 33.6 G/DL (ref 31–36)
MCV RBC AUTO: 99.7 FL (ref 80–100)
MICROSCOPIC EXAMINATION: YES
MONOCYTES ABSOLUTE: 0.9 K/UL (ref 0–1.3)
MONOCYTES RELATIVE PERCENT: 8.7 %
NEUTROPHILS ABSOLUTE: 7 K/UL (ref 1.7–7.7)
NEUTROPHILS RELATIVE PERCENT: 69.8 %
NITRITE, URINE: NEGATIVE
PDW BLD-RTO: 14 % (ref 12.4–15.4)
PH UA: 6 (ref 5–8)
PLATELET # BLD: 208 K/UL (ref 135–450)
PMV BLD AUTO: 9.6 FL (ref 5–10.5)
POTASSIUM SERPL-SCNC: 5.2 MMOL/L (ref 3.5–5.1)
PRO-BNP: 634 PG/ML (ref 0–124)
PROTEIN UA: NEGATIVE MG/DL
RBC # BLD: 4.37 M/UL (ref 4–5.2)
RBC UA: ABNORMAL /HPF (ref 0–2)
REASON FOR REJECTION: NORMAL
REJECTED TEST: NORMAL
SODIUM BLD-SCNC: 138 MMOL/L (ref 136–145)
SPECIFIC GRAVITY UA: 1.01 (ref 1–1.03)
TOTAL PROTEIN: 7.3 G/DL (ref 6.4–8.2)
TROPONIN: <0.01 NG/ML
URINE TYPE: ABNORMAL
UROBILINOGEN, URINE: 0.2 E.U./DL
WBC # BLD: 10.1 K/UL (ref 4–11)
WBC UA: ABNORMAL /HPF (ref 0–5)

## 2019-11-08 PROCEDURE — 85025 COMPLETE CBC W/AUTO DIFF WBC: CPT

## 2019-11-08 PROCEDURE — 85379 FIBRIN DEGRADATION QUANT: CPT

## 2019-11-08 PROCEDURE — 36415 COLL VENOUS BLD VENIPUNCTURE: CPT

## 2019-11-08 PROCEDURE — 84484 ASSAY OF TROPONIN QUANT: CPT

## 2019-11-08 PROCEDURE — 81001 URINALYSIS AUTO W/SCOPE: CPT

## 2019-11-08 PROCEDURE — 93005 ELECTROCARDIOGRAM TRACING: CPT | Performed by: EMERGENCY MEDICINE

## 2019-11-08 PROCEDURE — 83880 ASSAY OF NATRIURETIC PEPTIDE: CPT

## 2019-11-08 PROCEDURE — 80053 COMPREHEN METABOLIC PANEL: CPT

## 2019-11-08 PROCEDURE — 71046 X-RAY EXAM CHEST 2 VIEWS: CPT

## 2019-11-08 PROCEDURE — 99283 EMERGENCY DEPT VISIT LOW MDM: CPT

## 2019-11-08 RX ORDER — CHOLECALCIFEROL (VITAMIN D3) 1250 MCG
CAPSULE ORAL WEEKLY
COMMUNITY

## 2019-11-08 ASSESSMENT — ENCOUNTER SYMPTOMS
COUGH: 1
PHOTOPHOBIA: 0
NAUSEA: 0
RHINORRHEA: 0
ABDOMINAL DISTENTION: 0
DIARRHEA: 0
WHEEZING: 0
SHORTNESS OF BREATH: 0
VOMITING: 0
BACK PAIN: 0

## 2019-11-08 ASSESSMENT — PAIN SCALES - GENERAL: PAINLEVEL_OUTOF10: 7

## 2019-11-09 ENCOUNTER — APPOINTMENT (OUTPATIENT)
Dept: CT IMAGING | Age: 54
End: 2019-11-09
Payer: COMMERCIAL

## 2019-11-09 ENCOUNTER — HOSPITAL ENCOUNTER (EMERGENCY)
Age: 54
Discharge: HOME | End: 2019-11-09
Attending: EMERGENCY MEDICINE
Payer: COMMERCIAL

## 2019-11-09 VITALS
HEART RATE: 72 BPM | OXYGEN SATURATION: 93 % | WEIGHT: 252 LBS | DIASTOLIC BLOOD PRESSURE: 61 MMHG | TEMPERATURE: 98 F | SYSTOLIC BLOOD PRESSURE: 93 MMHG | RESPIRATION RATE: 22 BRPM | BODY MASS INDEX: 39.47 KG/M2

## 2019-11-09 DIAGNOSIS — R06.2 WHEEZING: ICD-10-CM

## 2019-11-09 DIAGNOSIS — R60.0 BILATERAL LOWER EXTREMITY EDEMA: ICD-10-CM

## 2019-11-09 DIAGNOSIS — J06.9 ACUTE UPPER RESPIRATORY INFECTION: Primary | ICD-10-CM

## 2019-11-09 DIAGNOSIS — R07.89 CHEST WALL PAIN: ICD-10-CM

## 2019-11-09 DIAGNOSIS — R79.89 ELEVATED BRAIN NATRIURETIC PEPTIDE (BNP) LEVEL: ICD-10-CM

## 2019-11-09 LAB
A/G RATIO: 1.1 (ref 1.1–2.2)
ALBUMIN SERPL-MCNC: 3.4 G/DL (ref 3.4–5)
ALP BLD-CCNC: 68 U/L (ref 40–129)
ALT SERPL-CCNC: 10 U/L (ref 10–40)
ANION GAP SERPL CALCULATED.3IONS-SCNC: 11 MMOL/L (ref 3–16)
AST SERPL-CCNC: 9 U/L (ref 15–37)
BILIRUB SERPL-MCNC: 0.3 MG/DL (ref 0–1)
BUN BLDV-MCNC: 11 MG/DL (ref 7–20)
CALCIUM SERPL-MCNC: 9.2 MG/DL (ref 8.3–10.6)
CHLORIDE BLD-SCNC: 100 MMOL/L (ref 99–110)
CO2: 30 MMOL/L (ref 21–32)
CREAT SERPL-MCNC: 1.1 MG/DL (ref 0.6–1.1)
EKG ATRIAL RATE: 62 BPM
EKG DIAGNOSIS: NORMAL
EKG P AXIS: 65 DEGREES
EKG P-R INTERVAL: 176 MS
EKG Q-T INTERVAL: 410 MS
EKG QRS DURATION: 86 MS
EKG QTC CALCULATION (BAZETT): 416 MS
EKG R AXIS: 43 DEGREES
EKG T AXIS: 20 DEGREES
EKG VENTRICULAR RATE: 62 BPM
GFR AFRICAN AMERICAN: >60
GFR NON-AFRICAN AMERICAN: 52
GLOBULIN: 3.2 G/DL
GLUCOSE BLD-MCNC: 89 MG/DL (ref 70–99)
HCT VFR BLD CALC: 42.8 % (ref 36–48)
HEMOGLOBIN: 14.2 G/DL (ref 12–16)
MCH RBC QN AUTO: 32.9 PG (ref 26–34)
MCHC RBC AUTO-ENTMCNC: 33.1 G/DL (ref 31–36)
MCV RBC AUTO: 99.5 FL (ref 80–100)
PDW BLD-RTO: 13.9 % (ref 12.4–15.4)
PLATELET # BLD: 194 K/UL (ref 135–450)
PMV BLD AUTO: 9.6 FL (ref 5–10.5)
POTASSIUM SERPL-SCNC: 3.8 MMOL/L (ref 3.5–5.1)
PRO-BNP: 588 PG/ML (ref 0–124)
RBC # BLD: 4.3 M/UL (ref 4–5.2)
SODIUM BLD-SCNC: 141 MMOL/L (ref 136–145)
TOTAL PROTEIN: 6.6 G/DL (ref 6.4–8.2)
TROPONIN: <0.01 NG/ML
WBC # BLD: 9.5 K/UL (ref 4–11)

## 2019-11-09 PROCEDURE — 80053 COMPREHEN METABOLIC PANEL: CPT

## 2019-11-09 PROCEDURE — 93010 ELECTROCARDIOGRAM REPORT: CPT | Performed by: INTERNAL MEDICINE

## 2019-11-09 PROCEDURE — 6370000000 HC RX 637 (ALT 250 FOR IP): Performed by: NURSE PRACTITIONER

## 2019-11-09 PROCEDURE — 93005 ELECTROCARDIOGRAM TRACING: CPT | Performed by: NURSE PRACTITIONER

## 2019-11-09 PROCEDURE — 83880 ASSAY OF NATRIURETIC PEPTIDE: CPT

## 2019-11-09 PROCEDURE — 99284 EMERGENCY DEPT VISIT MOD MDM: CPT

## 2019-11-09 PROCEDURE — 85027 COMPLETE CBC AUTOMATED: CPT

## 2019-11-09 PROCEDURE — 6360000002 HC RX W HCPCS: Performed by: NURSE PRACTITIONER

## 2019-11-09 PROCEDURE — 84484 ASSAY OF TROPONIN QUANT: CPT

## 2019-11-09 RX ORDER — ALBUTEROL SULFATE 2.5 MG/3ML
5 SOLUTION RESPIRATORY (INHALATION) ONCE
Status: COMPLETED | OUTPATIENT
Start: 2019-11-09 | End: 2019-11-09

## 2019-11-09 RX ORDER — ALBUTEROL SULFATE 90 UG/1
AEROSOL, METERED RESPIRATORY (INHALATION)
Qty: 1 INHALER | Refills: 1 | Status: SHIPPED | OUTPATIENT
Start: 2019-11-09 | End: 2020-09-22

## 2019-11-09 RX ORDER — PREDNISONE 10 MG/1
40 TABLET ORAL DAILY
Qty: 16 TABLET | Refills: 0 | Status: SHIPPED | OUTPATIENT
Start: 2019-11-09 | End: 2019-11-13

## 2019-11-09 RX ORDER — IPRATROPIUM BROMIDE AND ALBUTEROL SULFATE 2.5; .5 MG/3ML; MG/3ML
1 SOLUTION RESPIRATORY (INHALATION) ONCE
Status: COMPLETED | OUTPATIENT
Start: 2019-11-09 | End: 2019-11-09

## 2019-11-09 RX ORDER — PREDNISONE 10 MG/1
40 TABLET ORAL ONCE
Status: COMPLETED | OUTPATIENT
Start: 2019-11-09 | End: 2019-11-09

## 2019-11-09 RX ADMIN — IPRATROPIUM BROMIDE AND ALBUTEROL SULFATE 1 AMPULE: .5; 3 SOLUTION RESPIRATORY (INHALATION) at 18:39

## 2019-11-09 RX ADMIN — PREDNISONE 40 MG: 10 TABLET ORAL at 18:39

## 2019-11-09 RX ADMIN — ALBUTEROL SULFATE 5 MG: 2.5 SOLUTION RESPIRATORY (INHALATION) at 18:39

## 2019-11-09 ASSESSMENT — ENCOUNTER SYMPTOMS
BACK PAIN: 0
EYES NEGATIVE: 1
SHORTNESS OF BREATH: 0
VOMITING: 0
COUGH: 1
ABDOMINAL PAIN: 0
ABDOMINAL DISTENTION: 0
NAUSEA: 0
WHEEZING: 0
DIARRHEA: 0
ALLERGIC/IMMUNOLOGIC NEGATIVE: 1

## 2019-11-10 LAB
EKG ATRIAL RATE: 73 BPM
EKG DIAGNOSIS: NORMAL
EKG P AXIS: 58 DEGREES
EKG P-R INTERVAL: 174 MS
EKG Q-T INTERVAL: 420 MS
EKG QRS DURATION: 82 MS
EKG QTC CALCULATION (BAZETT): 462 MS
EKG R AXIS: 38 DEGREES
EKG T AXIS: 22 DEGREES
EKG VENTRICULAR RATE: 73 BPM

## 2019-11-10 PROCEDURE — 93010 ELECTROCARDIOGRAM REPORT: CPT | Performed by: INTERNAL MEDICINE

## 2020-02-11 ENCOUNTER — OFFICE VISIT (OUTPATIENT)
Dept: PULMONOLOGY | Age: 55
End: 2020-02-11
Payer: COMMERCIAL

## 2020-02-11 VITALS
BODY MASS INDEX: 41.75 KG/M2 | HEIGHT: 67 IN | HEART RATE: 57 BPM | DIASTOLIC BLOOD PRESSURE: 74 MMHG | OXYGEN SATURATION: 97 % | WEIGHT: 266 LBS | SYSTOLIC BLOOD PRESSURE: 120 MMHG

## 2020-02-11 PROCEDURE — G8926 SPIRO NO PERF OR DOC: HCPCS | Performed by: INTERNAL MEDICINE

## 2020-02-11 PROCEDURE — 99214 OFFICE O/P EST MOD 30 MIN: CPT | Performed by: INTERNAL MEDICINE

## 2020-02-11 PROCEDURE — 3023F SPIROM DOC REV: CPT | Performed by: INTERNAL MEDICINE

## 2020-02-11 PROCEDURE — G8484 FLU IMMUNIZE NO ADMIN: HCPCS | Performed by: INTERNAL MEDICINE

## 2020-02-11 PROCEDURE — G8427 DOCREV CUR MEDS BY ELIG CLIN: HCPCS | Performed by: INTERNAL MEDICINE

## 2020-02-11 PROCEDURE — G8417 CALC BMI ABV UP PARAM F/U: HCPCS | Performed by: INTERNAL MEDICINE

## 2020-02-11 PROCEDURE — 3017F COLORECTAL CA SCREEN DOC REV: CPT | Performed by: INTERNAL MEDICINE

## 2020-02-11 PROCEDURE — 1036F TOBACCO NON-USER: CPT | Performed by: INTERNAL MEDICINE

## 2020-02-11 RX ORDER — ALBUTEROL SULFATE 2.5 MG/3ML
2.5 SOLUTION RESPIRATORY (INHALATION)
COMMUNITY
Start: 2020-01-12 | End: 2020-09-22

## 2020-02-11 RX ORDER — BUDESONIDE AND FORMOTEROL FUMARATE DIHYDRATE 160; 4.5 UG/1; UG/1
AEROSOL RESPIRATORY (INHALATION)
Qty: 1 INHALER | Refills: 6 | Status: SHIPPED | OUTPATIENT
Start: 2020-02-11 | End: 2020-09-22 | Stop reason: SDUPTHER

## 2020-02-11 RX ORDER — ALBUTEROL SULFATE 2.5 MG/3ML
2.5 SOLUTION RESPIRATORY (INHALATION) EVERY 6 HOURS PRN
Qty: 120 VIAL | Refills: 6 | Status: SHIPPED | OUTPATIENT
Start: 2020-02-11 | End: 2020-09-22 | Stop reason: SDUPTHER

## 2020-02-11 RX ORDER — PREDNISONE 10 MG/1
TABLET ORAL
Qty: 30 TABLET | Refills: 0 | Status: SHIPPED | OUTPATIENT
Start: 2020-02-11 | End: 2020-02-21

## 2020-02-11 RX ORDER — LORATADINE 10 MG/1
10 TABLET ORAL DAILY
Qty: 30 TABLET | Refills: 6 | Status: SHIPPED | OUTPATIENT
Start: 2020-02-11 | End: 2020-12-17 | Stop reason: SDUPTHER

## 2020-02-11 RX ORDER — DOXYCYCLINE HYCLATE 100 MG
100 TABLET ORAL 2 TIMES DAILY
Qty: 10 TABLET | Refills: 0 | Status: SHIPPED | OUTPATIENT
Start: 2020-02-11 | End: 2020-02-16

## 2020-02-11 RX ORDER — ALBUTEROL SULFATE 90 UG/1
2 AEROSOL, METERED RESPIRATORY (INHALATION) EVERY 6 HOURS PRN
Qty: 1 INHALER | Refills: 6 | Status: SHIPPED | OUTPATIENT
Start: 2020-02-11 | End: 2020-09-22 | Stop reason: SDUPTHER

## 2020-02-11 RX ORDER — MECLIZINE HYDROCHLORIDE 25 MG/1
25 TABLET ORAL 3 TIMES DAILY PRN
COMMUNITY

## 2020-02-11 NOTE — PROGRESS NOTES
[facility]                                                            Outpatient Follow Up Note      CHIEF COMPLAINT: Follow up COPD      HPI:   Doing okay overall  Some cough with clear sputum  No hemoptysis   No smoking   Uses Albuterol 0-2 time/day     Past Medical History:   Diagnosis Date    Allergic rhinitis     Anxiety     Asthma     Bipolar disorder, mixed ([address]     COPD (chronic obstructive pulmonary disease) ([address]     Depression     Eczema     Pulmonary nodule     Tobacco abuse        Past Surgical History:        Procedure Laterality Date    BRONCHOSCOPY      TONSILLECTOMY         Allergies:  is allergic to acetaminophen-codeine; darvocet [propoxyphene n-acetaminophen]; eggs or egg-derived products; percocet [oxycodone-acetaminophen]; and vicodin [hydrocodone-acetaminophen]. Social History:    TOBACCO:   reports that she quit smoking about 3 years ago. Her smoking use included cigarettes. She has a 68.00 pack-year smoking history. She has never used smokeless tobacco.  ETOH:   reports no history of alcohol use. Family History:       Problem Relation Age of Onset    Diabetes Mother     Hypertension Father     Asthma Neg Hx     Cancer Neg Hx     Emphysema Neg Hx     Heart Failure Neg Hx        Current Medications:    Current Outpatient Medications:     albuterol (PROVENTIL) (2.5 MG/3ML) 0.083% nebulizer solution, 2.5 mg, Disp: , Rfl:     meclizine (ANTIVERT) 25 MG tablet, Take 25 mg by mouth 3 times daily as needed PRN, Disp: , Rfl:     albuterol sulfate HFA (PROAIR HFA) 108 (90 Base) MCG/ACT inhaler, Use 2 puffs every 4 hours while awake for PRN wheezing  Dispense with SPACER and Instruct on use.   May sub Ventolin or Proventil as needed per Insurance., Disp: 1 Inhaler, Rfl: 1    Cholecalciferol (VITAMIN D3) 1.25 MG (55455 UT) CAPS, Take by mouth once a week, Disp: , Rfl:     fluticasone (FLONASE) 50 MCG/ACT nasal spray, INHALE 2 SPRAYS IN EACH NOSTRIL ONE TIME A DAY , Disp: 1 Bottle, Rfl: 5    oxybutynin (DITROPAN-XL) 5 MG extended release tablet, TAKE 1 TABLET BY MOUTH ONE TIME A DAY, Disp: , Rfl: 3    loratadine (CLARITIN) 10 MG tablet, Take 1 tablet by mouth daily, Disp: 30 tablet, Rfl: 6    budesonide-formoterol (SYMBICORT) 160-4.5 MCG/ACT AERO, INHALE 2 PUFFS BY MOUTH TWO TIMES A DAY, Disp: 1 Inhaler, Rfl: 6    buPROPion (WELLBUTRIN) 100 MG tablet, Take 100 mg by mouth 2 times daily, Disp: , Rfl:     QUEtiapine Fumarate (SEROQUEL PO), Take 25 mg by mouth daily, Disp: , Rfl:     ranitidine (ZANTAC) 150 MG tablet, TAKE 1 TABLET BY MOUTH TWO TIMES A DAY, Disp: , Rfl: 3    hydrOXYzine (VISTARIL) 50 MG capsule, Take 25 mg by mouth nightly , Disp: , Rfl:     lurasidone (LATUDA) 20 MG TABS tablet, Take 80 mg by mouth daily , Disp: , Rfl:       Objective:     Blood pressure 120/74, pulse 57, height 5' 7\" (1.702 m), weight 266 lb (120.7 kg), last menstrual period 07/04/2016, SpO2 97 %.' on RA  Gen: No distress. Eyes: PERRL. No sclera icterus. No conjunctival injection. ENT: No discharge. Pharynx clear. Neck: Trachea midline. No obvious mass. Resp: No accessory muscle use. No crackles. Few wheezes. No rhonchi. No dullness on percussion. Good air entry. CV: Regular rate. Regular rhythm. No murmur or rub. No edema. GI: Non-tender. Non-distended. No hernia. Skin: Warm and dry. No nodule on exposed extremities. Lymph: No cervical LAD. No supraclavicular LAD. M/S: No cyanosis. No joint deformity. No clubbing. Neuro: Awake. Alert. Moves all four extremities. Psych: Oriented x 3. No anxiety. DATA reviewed by me:   PFTs 08/08/2019 FEV1 1.58L(51%) Ratio 57% TLC 5.53L(95%)   DLCO 26.05 (102%) . PFTs 12/05/2017 FEV1 1.84L(59%) Ratio 57% TLC 7.06L(122%) DLCO 25.67 (98%)   6MW 1320 F LO2 92%. PFTs 08/14/2015 FEV1 1.80L(57%) Ratio 61% TLC 6.74L(116%) DLCO 30.47 (116%) 6MW 1200 F LO2 94%. PFTs 03/04/2013 FEV1 1.77L(60%)                  TLC 5.60L(97%)   DLCO 20.03 (93%)   6MW 1200 F LO2 94%. PFTs 01/10/2012 FEV1 1.80L(60%)                   TLC 5.92L(106%) DLCO 20.74 (96%)     IgE of 157. Elevated IgE for egg white, shrimp, cat dander, dust mite ragweed, and dog dander. She got rid of her cats. CXR 11/8/2019  images reviewed by me and showed:  No acute cardiopulmonary disease     Assessment:       · Moderate obstructive airway disease secondary to COPD and Asthma  · Allergic rhinitis   · Pulmonary nodules. Remote granulomatous disease. Negative Bronch. Repeated CT chest 11/2009 showed parenchymal scarring within each lung which are unchanged or improved since 2007  · 66 pack year smoking - quit 11/2015  · Declined Xolair shots       Plan:       · Continue Symbicort 160/4.25 2 puffs BID  · Continue Claritin   · Off Singulair given bipolar history  · Albuterol INH/Neb QID PRN   · She refuses vaccines due to egg allergy   · Order for Neb machine   · Peak flow meter monitoring  · Prednisone taper and Doxycycline 100 mg BID x 5 days for COPD AE self management if needed.    · Advised to continue with smoking cessation

## 2020-04-15 RX ORDER — FLUTICASONE PROPIONATE 50 MCG
2 SPRAY, SUSPENSION (ML) NASAL DAILY
Qty: 1 BOTTLE | Refills: 6 | Status: SHIPPED | OUTPATIENT
Start: 2020-04-15 | End: 2020-11-11

## 2020-05-07 RX ORDER — ALBUTEROL SULFATE 2.5 MG/3ML
SOLUTION RESPIRATORY (INHALATION)
Qty: 120 VIAL | Refills: 6 | Status: SHIPPED | OUTPATIENT
Start: 2020-05-07 | End: 2020-09-22

## 2020-08-13 ENCOUNTER — TELEPHONE (OUTPATIENT)
Dept: PULMONOLOGY | Age: 55
End: 2020-08-13

## 2020-08-13 NOTE — TELEPHONE ENCOUNTER
Within this Telehealth Consent, the terms you and yours refer to the person using the Telehealth Service (Service), or in the case of a use of the Service by or on behalf of a minor, you and yours refer to and include (i) the parent or legal guardian who provides consent to the use of the Service by such minor or uses the Service on behalf of such minor, and (ii) the minor for whom consent is being provided or on whose behalf the Service is being utilized. When using Service, you will be consulting with your health care providers via the use of Telehealth.   Telehealth involves the delivery of healthcare services using electronic communications, information technology or other means between a healthcare provider and a patient who are not in the same physical location. Telehealth may be used for diagnosis, treatment, follow-up and/or patient education, and may include, but is not limited to, one or more of the following:    Electronic transmission of medical records, photo images, personal health information or other data between a patient and a healthcare provider    Interactions between a patient and healthcare provider via audio, video and/or data communications    Use of output data from medical devices, sound and video files    Anticipated Benefits   The use of Telehealth by your Provider(s) through the Service may have the following possible benefits:    Making it easier and more efficient for you to access medical care and treatment for the conditions treated by such Provider(s) utilizing the Service    Allowing you to obtain medical care and treatment by Provider(s) at times that are convenient for you    Enabling you to interact with Provider(s) without the necessity of an in-office appointment     Possible Risks   While the use of Telehealth can provide potential benefits for you, there are also potential risks associated with the use of Telehealth.  These risks include, but may not be limited to the following:    Your Provider(s) may not able to provide medical treatment for your particular condition and you may be required to seek alternative healthcare or emergency care services.  The electronic systems or other security protocols or safeguards used in the Service could fail, causing a breach of privacy of your medical or other information.  Given regulatory requirements in certain jurisdictions, your Provider(s) diagnosis and/or treatment options, especially pertaining to certain prescriptions, may be limited. Acceptance   1. You understand that Services will be provided via Telehealth. This process involves the use of HIPAA compliant and secure, real-time audio-visual interfacing with a qualified and appropriately trained provider located at [facility]. 2. You understand that, under no circumstances, will this session be recorded. 3. You understand that the Provider(s) at [facility] and other clinical participants will be party to the information obtained during the Telehealth session in accordance with best medical practices. 4. You understand that the information obtained during the Telehealth session will be used to help determine the most appropriate treatment options. 5. You understand that You have the right to revoke this consent at any point in time. 6. You understand that Telehealth is voluntary, and that continued treatment is not dependent upon consent. 7. You understand that, in the event of non-consent to Telehealth services and/or technical difficulties, you will obtain services as typically provided in the absence of Telehealth technology. 8. You understand that this consent will be kept in Your medical record. 9. No potential benefits from the use of Telehealth or specific results can be guaranteed. Your condition may not be cured or improved and, in some cases, may get worse.    10. There are limitations in the provision of medical care and treatment via Telehealth and the Service and you may not be able to receive diagnosis and/or treatment through the Service for every condition for which you seek diagnosis and/or treatment. 11. There are potential risks to the use of Telehealth, including but not limited to the risks described in this Telehealth Consent. 12. Your Provider(s) have discussed the use of Telehealth and the Service with you, including the benefits and risks of such and you have provided oral consent to your Provider(s) for the use of Telehealth and the Service. 15. You understand that it is your duty to provide your Provider(s) truthful, accurate and complete information, including all relevant information regarding care that you may have received or may be receiving from other healthcare providers outside of the Service. 14. You understand that each of your Provider(s) may determine in his or sole discretion that your condition is not suitable for diagnosis and/or treatment using the Service, and that you may need to seek medical care and treatment a specialist or other healthcare provider, outside of the Service. 15. You understand that you are fully responsible for payment for all services provided by Provider(s) or through use of the Service and that you may not be able to use third-party insurance. 16. You represent that (a) you have read this Telehealth Consent carefully, (b) you understand the risks and benefits of the Service and the use of Telehealth in the medical care and treatment provided to you by Provider(s) using the Service, and (c) you have the legal capacity and authority to provide this consent for yourself and/or the minor for which you are consenting under applicable federal and state laws, including laws relating to the age of [de-identified] and/or parental/guardian consent.    17. You give your informed consent to the use of Telehealth by Provider(s) using the Service under the terms described in the Terms of Service and this Telehealth Consent. The patient was read the following statement and has consented to the visit as of 8/13/20. The patient has been scheduled for their first telehealth visit on 9/22/2020 with Dr Sully Bryant

## 2020-09-22 ENCOUNTER — VIRTUAL VISIT (OUTPATIENT)
Dept: PULMONOLOGY | Age: 55
End: 2020-09-22
Payer: COMMERCIAL

## 2020-09-22 PROCEDURE — 99214 OFFICE O/P EST MOD 30 MIN: CPT | Performed by: INTERNAL MEDICINE

## 2020-09-22 RX ORDER — DOXYCYCLINE HYCLATE 100 MG
100 TABLET ORAL 2 TIMES DAILY
Qty: 10 TABLET | Refills: 0 | Status: SHIPPED | OUTPATIENT
Start: 2020-09-22 | End: 2020-09-27

## 2020-09-22 RX ORDER — ALBUTEROL SULFATE 2.5 MG/3ML
2.5 SOLUTION RESPIRATORY (INHALATION) EVERY 6 HOURS PRN
Qty: 120 VIAL | Refills: 6 | Status: SHIPPED | OUTPATIENT
Start: 2020-09-22 | End: 2021-05-06 | Stop reason: SDUPTHER

## 2020-09-22 RX ORDER — PREDNISONE 10 MG/1
TABLET ORAL
Qty: 30 TABLET | Refills: 0 | Status: SHIPPED | OUTPATIENT
Start: 2020-09-22 | End: 2020-10-02

## 2020-09-22 RX ORDER — ALBUTEROL SULFATE 90 UG/1
2 AEROSOL, METERED RESPIRATORY (INHALATION) EVERY 6 HOURS PRN
Qty: 1 INHALER | Refills: 6 | Status: SHIPPED | OUTPATIENT
Start: 2020-09-22 | End: 2021-05-06 | Stop reason: SDUPTHER

## 2020-09-22 RX ORDER — BUDESONIDE AND FORMOTEROL FUMARATE DIHYDRATE 160; 4.5 UG/1; UG/1
AEROSOL RESPIRATORY (INHALATION)
Qty: 1 INHALER | Refills: 6 | Status: SHIPPED | OUTPATIENT
Start: 2020-09-22 | End: 2021-05-06 | Stop reason: SDUPTHER

## 2020-09-22 NOTE — PROGRESS NOTES
[facility]                                                            Outpatient Follow Up Note  TELEHEALTH EVALUATION: Service performed was Audio/Visual (During YUFJO-02 public health emergency) and not a face-to-face visit       CHIEF COMPLAINT: Follow up COPD      HPI:   Not doing well for 5 days   + nasal congestion + sneezing  Cough for yellow sputum for few days  + wheezes   No hemoptysis   Uses Albuterol 5 times/day   No smoking     Past Medical History:   Diagnosis Date    Allergic rhinitis     Anxiety     Asthma     Bipolar disorder, mixed ([address]     [facility]     Depression     Eczema     Pulmonary nodule     Tobacco abuse        Past Surgical History:        Procedure Laterality Date    BRONCHOSCOPY      TONSILLECTOMY         Allergies:  is allergic to acetaminophen-codeine; darvocet [propoxyphene n-acetaminophen]; eggs or egg-derived products; percocet [oxycodone-acetaminophen]; and vicodin [hydrocodone-acetaminophen]. Social History:    TOBACCO:   reports that she quit smoking about 4 years ago. Her smoking use included cigarettes. She has a 68.00 pack-year smoking history. She has never used smokeless tobacco.  ETOH:   reports no history of alcohol use.       Family History:       Problem Relation Age of Onset    Diabetes Mother     Hypertension Father     Asthma Neg Hx     Cancer Neg Hx     Emphysema Neg Hx     Heart Failure Neg Hx        Current Medications:    Current Outpatient Medications:     albuterol (PROVENTIL) (2.5 MG/3ML) 0.083% nebulizer solution, INHALE 3 MILLILITERS BY MOUTH VIA NEBULIZER EVERY 6 HOURS AS NEEDED FOR WHEEZING OR SHORTNESS OF BREATH, Disp: 120 vial, Rfl: 6    fluticasone (FLONASE) 50 MCG/ACT nasal spray, 2 sprays by Nasal route daily, Disp: 1 Bottle, Rfl: 6    albuterol (PROVENTIL) (2.5 MG/3ML) 0.083% nebulizer solution, 2.5 mg, Disp: , Claritin   · Off Singulair given bipolar history  · She refuses vaccines due to egg allergy   · Peak flow meter monitoring  · Advised to continue with smoking cessation  · Follow up in 3 months               Carolz Piper is a 50 y.o. female being evaluated by a Virtual Visit (video visit) encounter to address concerns as mentioned above. A caregiver was present when appropriate. Due to this being a TeleHealth encounter (During XLQVV-08 public health emergency), evaluation of the following organ systems was limited: Vitals/Constitutional/EENT/Resp/CV/GI//MS/Neuro/Skin/Heme-Lymph-Imm. Pursuant to the emergency declaration under the [address] [place] [address], [address] [place] [address] authority and the [employer] and [employer] Act, this Virtual Visit was conducted with patient's (and/or legal guardian's) consent, to reduce the patient's risk of exposure to COVID-19 and provide necessary medical care. The patient (and/or legal guardian) has also been advised to contact this office for worsening conditions or problems, and seek emergency medical treatment and/or call 911 if deemed necessary. Services were provided through a video synchronous discussion virtually to substitute for in-person clinic visit. Patient was located in her home, provider was located in his office. --Vishal Christianson MD on 9/22/2020 at 10:53 AM    An electronic signature was used to authenticate this note.

## 2020-11-12 RX ORDER — FLUTICASONE PROPIONATE 50 MCG
SPRAY, SUSPENSION (ML) NASAL
Qty: 16 G | Refills: 5 | Status: SHIPPED | OUTPATIENT
Start: 2020-11-12 | End: 2021-05-21

## 2020-12-14 ENCOUNTER — VIRTUAL VISIT (OUTPATIENT)
Dept: PULMONOLOGY | Age: 55
End: 2020-12-14
Payer: COMMERCIAL

## 2020-12-14 PROCEDURE — 99213 OFFICE O/P EST LOW 20 MIN: CPT | Performed by: INTERNAL MEDICINE

## 2020-12-14 RX ORDER — FAMOTIDINE 20 MG/1
TABLET, FILM COATED ORAL
COMMUNITY
Start: 2020-11-11

## 2020-12-14 RX ORDER — RISPERIDONE 1 MG/1
TABLET, FILM COATED ORAL
COMMUNITY
Start: 2020-12-03 | End: 2022-05-16 | Stop reason: ALTCHOICE

## 2020-12-14 NOTE — PROGRESS NOTES
Disp: 1 Inhaler, Rfl: 6    albuterol sulfate HFA (PROAIR HFA) 108 (90 Base) MCG/ACT inhaler, Inhale 2 puffs into the lungs every 6 hours as needed for Wheezing or Shortness of Breath, Disp: 1 Inhaler, Rfl: 6    albuterol (PROVENTIL) (2.5 MG/3ML) 0.083% nebulizer solution, Take 3 mLs by nebulization every 6 hours as needed for Wheezing or Shortness of Breath DX COPD J44.9, Disp: 120 vial, Rfl: 6    tiotropium (SPIRIVA RESPIMAT) 2.5 MCG/ACT AERS inhaler, Inhale 2 puffs into the lungs daily, Disp: 1 Inhaler, Rfl: 6    meclizine (ANTIVERT) 25 MG tablet, Take 25 mg by mouth 3 times daily as needed PRN, Disp: , Rfl:     loratadine (CLARITIN) 10 MG tablet, Take 1 tablet by mouth daily, Disp: 30 tablet, Rfl: 6    Cholecalciferol (VITAMIN D3) 1.25 MG (95172 UT) CAPS, Take by mouth once a week, Disp: , Rfl:     oxybutynin (DITROPAN-XL) 5 MG extended release tablet, TAKE 1 TABLET BY MOUTH ONE TIME A DAY, Disp: , Rfl: 3    buPROPion (WELLBUTRIN) 100 MG tablet, Take 100 mg by mouth 2 times daily, Disp: , Rfl:     QUEtiapine Fumarate (SEROQUEL PO), Take 25 mg by mouth daily, Disp: , Rfl:     hydrOXYzine (VISTARIL) 50 MG capsule, Take 25 mg by mouth nightly , Disp: , Rfl:       Objective:     Mallampati class IV. Constitutional:  No acute distress. Appears well developed and nourished. Eyes: No sclera icterus. EOM intact. No visible discharge. HENT: Head is normocephalic and atraumatic. Mucus membranes are moist and the tongue appears normal. Normal appearing nose. External Ears normal.   Neck: No visualized mass. Cyntha Marts is midline   Resp: No accessory muscle use. Respiratory effort normal. No visualized signs of difficulty breathing or respiratory distress. Cardiovascular: LE edema per patient's report   Musculoskeletal: No signs of ataxia. Normal range of motion of the neck. Skin: No significant exanthematous lesions or discoloration noted on facial skin    Neuro: Awake. Alert. Able to follow commands.   No National Emergencies Act, [facility] waiver authority and the [employer] and [employer] Act, this Virtual Visit was conducted with patient's (and/or legal guardian's) consent, to reduce the patient's risk of exposure to COVID-19 and provide necessary medical care. The patient (and/or legal guardian) has also been advised to contact this office for worsening conditions or problems, and seek emergency medical treatment and/or call 911 if deemed necessary. Services were provided through a video synchronous discussion virtually to substitute for in-person clinic visit. Patient was located in her home, provider was located in his office. --Tato Thorpe MD on 12/14/2020 at 11:19 AM    An electronic signature was used to authenticate this note.

## 2020-12-17 RX ORDER — LORATADINE 10 MG/1
10 TABLET ORAL DAILY
Qty: 30 TABLET | Refills: 6 | Status: SHIPPED | OUTPATIENT
Start: 2020-12-17

## 2021-03-14 ENCOUNTER — APPOINTMENT (OUTPATIENT)
Dept: GENERAL RADIOLOGY | Age: 56
End: 2021-03-14
Payer: COMMERCIAL

## 2021-03-14 ENCOUNTER — HOSPITAL ENCOUNTER (EMERGENCY)
Age: 56
Discharge: HOME | End: 2021-03-14
Attending: EMERGENCY MEDICINE
Payer: COMMERCIAL

## 2021-03-14 VITALS
HEART RATE: 78 BPM | BODY MASS INDEX: 41.75 KG/M2 | TEMPERATURE: 98 F | HEIGHT: 67 IN | WEIGHT: 266 LBS | RESPIRATION RATE: 26 BRPM | DIASTOLIC BLOOD PRESSURE: 78 MMHG | OXYGEN SATURATION: 95 % | SYSTOLIC BLOOD PRESSURE: 149 MMHG

## 2021-03-14 DIAGNOSIS — R06.09 DYSPNEA ON EXERTION: ICD-10-CM

## 2021-03-14 DIAGNOSIS — R60.0 LEG EDEMA: Primary | ICD-10-CM

## 2021-03-14 DIAGNOSIS — I51.7 CARDIOMEGALY: ICD-10-CM

## 2021-03-14 DIAGNOSIS — R03.0 ELEVATED BLOOD PRESSURE READING: ICD-10-CM

## 2021-03-14 DIAGNOSIS — I50.9 ACUTE CONGESTIVE HEART FAILURE, UNSPECIFIED HEART FAILURE TYPE (HCC): ICD-10-CM

## 2021-03-14 LAB
A/G RATIO: 1.3 (ref 1.1–2.2)
ALBUMIN SERPL-MCNC: 3.6 G/DL (ref 3.4–5)
ALP BLD-CCNC: 64 U/L (ref 40–129)
ALT SERPL-CCNC: 13 U/L (ref 10–40)
ANION GAP SERPL CALCULATED.3IONS-SCNC: 6 MMOL/L (ref 3–16)
AST SERPL-CCNC: 20 U/L (ref 15–37)
BASOPHILS ABSOLUTE: 0.1 K/UL (ref 0–0.2)
BASOPHILS RELATIVE PERCENT: 1.4 %
BILIRUB SERPL-MCNC: 0.4 MG/DL (ref 0–1)
BILIRUBIN URINE: NEGATIVE
BLOOD, URINE: NEGATIVE
BUN BLDV-MCNC: 14 MG/DL (ref 7–20)
CALCIUM SERPL-MCNC: 9 MG/DL (ref 8.3–10.6)
CHLORIDE BLD-SCNC: 102 MMOL/L (ref 99–110)
CLARITY: CLEAR
CO2: 33 MMOL/L (ref 21–32)
COLOR: YELLOW
CREAT SERPL-MCNC: 1 MG/DL (ref 0.6–1.1)
EOSINOPHILS ABSOLUTE: 0.2 K/UL (ref 0–0.6)
EOSINOPHILS RELATIVE PERCENT: 2.9 %
GFR AFRICAN AMERICAN: >60
GFR NON-AFRICAN AMERICAN: 58
GLOBULIN: 2.8 G/DL
GLUCOSE BLD-MCNC: 100 MG/DL (ref 70–99)
GLUCOSE URINE: NEGATIVE MG/DL
HCT VFR BLD CALC: 43.4 % (ref 36–48)
HEMOGLOBIN: 14.4 G/DL (ref 12–16)
KETONES, URINE: NEGATIVE MG/DL
LEUKOCYTE ESTERASE, URINE: NEGATIVE
LYMPHOCYTES ABSOLUTE: 1.2 K/UL (ref 1–5.1)
LYMPHOCYTES RELATIVE PERCENT: 16.7 %
MCH RBC QN AUTO: 31.7 PG (ref 26–34)
MCHC RBC AUTO-ENTMCNC: 33.1 G/DL (ref 31–36)
MCV RBC AUTO: 95.7 FL (ref 80–100)
MICROSCOPIC EXAMINATION: NORMAL
MONOCYTES ABSOLUTE: 0.7 K/UL (ref 0–1.3)
MONOCYTES RELATIVE PERCENT: 9.9 %
NEUTROPHILS ABSOLUTE: 5.1 K/UL (ref 1.7–7.7)
NEUTROPHILS RELATIVE PERCENT: 69.1 %
NITRITE, URINE: NEGATIVE
PDW BLD-RTO: 14.4 % (ref 12.4–15.4)
PH UA: 7 (ref 5–8)
PLATELET # BLD: 174 K/UL (ref 135–450)
PMV BLD AUTO: 8.9 FL (ref 5–10.5)
POTASSIUM SERPL-SCNC: 5.1 MMOL/L (ref 3.5–5.1)
PRO-BNP: 3752 PG/ML (ref 0–124)
PROTEIN UA: NEGATIVE MG/DL
RBC # BLD: 4.54 M/UL (ref 4–5.2)
SODIUM BLD-SCNC: 141 MMOL/L (ref 136–145)
SPECIFIC GRAVITY UA: 1.02 (ref 1–1.03)
SPECIMEN STATUS: NORMAL
TOTAL PROTEIN: 6.4 G/DL (ref 6.4–8.2)
URINE REFLEX TO CULTURE: NORMAL
URINE TYPE: NORMAL
UROBILINOGEN, URINE: 1 E.U./DL
WBC # BLD: 7.4 K/UL (ref 4–11)

## 2021-03-14 PROCEDURE — 99283 EMERGENCY DEPT VISIT LOW MDM: CPT

## 2021-03-14 PROCEDURE — 80053 COMPREHEN METABOLIC PANEL: CPT

## 2021-03-14 PROCEDURE — 71046 X-RAY EXAM CHEST 2 VIEWS: CPT

## 2021-03-14 PROCEDURE — 83880 ASSAY OF NATRIURETIC PEPTIDE: CPT

## 2021-03-14 PROCEDURE — 81003 URINALYSIS AUTO W/O SCOPE: CPT

## 2021-03-14 PROCEDURE — 85025 COMPLETE CBC W/AUTO DIFF WBC: CPT

## 2021-03-14 PROCEDURE — 93005 ELECTROCARDIOGRAM TRACING: CPT | Performed by: EMERGENCY MEDICINE

## 2021-03-14 RX ORDER — FUROSEMIDE 20 MG/1
20 TABLET ORAL DAILY
Qty: 30 TABLET | Refills: 0 | Status: SHIPPED | OUTPATIENT
Start: 2021-03-14 | End: 2021-04-01

## 2021-03-14 ASSESSMENT — PAIN SCALES - GENERAL: PAINLEVEL_OUTOF10: 4

## 2021-03-14 ASSESSMENT — PAIN DESCRIPTION - LOCATION: LOCATION: ABDOMEN;LEG

## 2021-03-14 ASSESSMENT — PAIN DESCRIPTION - DESCRIPTORS: DESCRIPTORS: PRESSURE

## 2021-03-15 LAB
EKG ATRIAL RATE: 74 BPM
EKG DIAGNOSIS: NORMAL
EKG P AXIS: 59 DEGREES
EKG P-R INTERVAL: 154 MS
EKG Q-T INTERVAL: 310 MS
EKG QRS DURATION: 88 MS
EKG QTC CALCULATION (BAZETT): 344 MS
EKG R AXIS: 65 DEGREES
EKG T AXIS: 45 DEGREES
EKG VENTRICULAR RATE: 74 BPM

## 2021-03-15 PROCEDURE — 93010 ELECTROCARDIOGRAM REPORT: CPT | Performed by: INTERNAL MEDICINE

## 2021-03-15 NOTE — ED PROVIDER NOTES
Emergency Physician Note  [address] [place] [address]  ED  [address] [address]  Dept: [phone]  Dept Fax: [phone]  Loc: [phone]  Open Note Time:  9:07 PM EDT    Chief Complaint  Leg Swelling (swelling to bilateral legs and abdomen for the last couple days; redness and itching to abdomen; SOB with excertion; also concerned for UTI; increased urgency)       History of Present Illness  Tracy Asher is a 50 y.o. female  has a past medical history of Allergic rhinitis, Anxiety, Asthma, Bipolar disorder, mixed ([facility] [address] COPD (chronic obstructive pulmonary disease) ([facility] [address] Depression, Eczema, Pulmonary nodule, and Tobacco abuse. who presents to the ED for bilateral lower extremity swelling, swelling of the lower abdomen, and dyspnea on exertion. She states the symptoms have been noticeable for the past 4 to 5 days. Patient recently diagnosed with COPD however does not use supplemental oxygen at home but she is currently on 3 different inhalers and does nebulizer treatments at home. She reports urinary frequency for the past several days however denies any dysuria or hematuria. She does not have any abdominal pain she just feels tightness on the pannus of her lower abdomen. She has what she calls a smoker cough which is nonproductive. She reports that which when she does exert herself she frequently has to pause but as soon as she rests her shortness of breath will resolve fairly quickly and that she is able to continue exertion again. She reports she has had some weeping from the swelling on her lower extremities however currently does not have any at this time. She is not experiencing pain with the lower extremity swelling. Denies fever,  chest pain, shortness of breath, cough, abdominal pain, nausea, vomiting, diarrhea, headache, sore throat, dysuria, back pain, rash. No palliative/provocative factors.        Unless otherwise stated in this report or a week    Historical Provider, MD   oxybutynin (DITROPAN-XL) 5 MG extended release tablet TAKE 1 TABLET BY MOUTH ONE TIME A DAY 6/28/19   Historical Provider, MD   buPROPion (WELLBUTRIN) 100 MG tablet Take 100 mg by mouth 2 times daily    Historical Provider, MD   QUEtiapine Fumarate (SEROQUEL PO) Take 25 mg by mouth daily    Historical Provider, MD   hydrOXYzine (VISTARIL) 50 MG capsule Take 25 mg by mouth nightly     Historical Provider, MD       Allergies as of 03/14/2021 - Review Complete 03/14/2021   Allergen Reaction Noted    Acetaminophen-codeine  05/06/2010    Darvocet [propoxyphene n-acetaminophen]  05/06/2010    Eggs or egg-derived products  12/14/2017    Percocet [oxycodone-acetaminophen]  05/06/2010    Vicodin [hydrocodone-acetaminophen]  05/06/2010       Past Medical History:   Diagnosis Date    Allergic rhinitis     Anxiety     Asthma     Bipolar disorder, mixed ([facility] [address]     [facility] obstructive [facility] disease) ([facility]     Depression     Eczema     Pulmonary nodule     Tobacco abuse         Surgical History:   Past Surgical History:   Procedure Laterality Date    BRONCHOSCOPY      TONSILLECTOMY          Family History:    Family History   Problem Relation Age of Onset    Diabetes Mother     Hypertension Father     Asthma Neg Hx     Cancer Neg Hx     Emphysema Neg Hx     Heart Failure Neg Hx        Social History     Socioeconomic History    Marital status: Single     Spouse name: Not on file    Number of children: Not on file    Years of education: Not on file    Highest education level: Not on file   Occupational History    Not on file   Social Needs    Financial resource strain: Not on file    Food insecurity     Worry: Not on file     Inability: Not on file    Transportation needs     Medical: Not on file     Non-medical: Not on file   Tobacco Use    Smoking status: Former Smoker     Packs/day: 2.00     Years: 34.00     Pack years: 68.00     Types: Cigarettes Quit date: 2016     Years since quittin.7    Smokeless tobacco: Never Used   Substance and Sexual Activity    Alcohol use: No     Alcohol/week: 0.0 standard drinks    Drug use: No    Sexual activity: Not Currently   Lifestyle    Physical activity     Days per week: Not on file     Minutes per session: Not on file    Stress: Not on file   Relationships    Social connections     Talks on phone: Not on file     Gets together: Not on file     Attends Islam service: Not on file     Active member of club or organization: Not on file     Attends meetings of clubs or organizations: Not on file     Relationship status: Not on file    Intimate partner violence     Fear of current or ex partner: Not on file     Emotionally abused: Not on file     Physically abused: Not on file     Forced sexual activity: Not on file   Other Topics Concern    Not on file   Social History Narrative    Not on file       Nursing notes reviewed. ED Triage Vitals [21]   Enc Vitals Group      BP (!) 148/70      Pulse 73      Resp 26      Temp 98 °F (36.7 °C)      Temp Source Oral      SpO2 95 %      Weight 266 lb (120.7 kg)      Height 5' 7\" (1.702 m)      Head Circumference       Peak Flow       Pain Score       Pain Loc       Pain Edu? Excl. in [address]? GENERAL:   Body mass index is 41.66 kg/m². Awake, alert. Well developed, well nourished with no apparent distress. Nontoxic-appearing, non-ill-appearing. HENT:   Normocephalic, Atraumatic, no lacerations. No ENT exam due to PPE. EYES:   Conjunctiva normal,   Pupils equal round and reactive to light,   Extraocular movements normal.  NECK:  Trachea is midline. No stridor. CHEST:  Regular rate and regular rhythm, no murmurs/rubs/gallops,  normal S1/S2, chest wall non-tender. LUNGS:  Poor respiratory effort, she did drop down to 88% on the monitor when I was speaking with her  No respiratory distress.   No abdominal retractions, no sternal retractions  Diffuse wheezing with diminished breath sounds bibasilarly, no rhochi, no rales  Speaking comfortably in full sentences  ABDOMEN:  Soft, non-tender, non-distended. No guarding. No rebound. No costovertebral angle tenderness to palpation. Normal BS, no organomegaly, no abdominal masses  EXTREMITIES:  Moves extremities x4 with purpose. Normal range of motion, bilateral lower extremity has extremely dry skin with areas of erythema and venous stasis changes, the erythema is not well demarcated, the edema is not pitting and she does have areas that indicate they were recently weeping but none no longer weeping at this time                    No tenderness, no deformity,  distal pulses present and equal bilaterally. SKIN:  Warm, dry and intact. NEUROLOGIC:  Normal mental status. Moving all extremities to command. Alert and oriented x4  without focal motor deficit or gross sensory deficit. Normal speech. PSYCHIATRIC:  Not anxious,  normal mood and affect,  Appropriate eye contact,  thoughts are linear and organized,  without delusions/hallucinations,  Not responding to internal stimuli,  responds appropriately to questions    LABS and DIAGNOSTIC RESULTS  EKG  The Ekg interpreted by me shows  normal sinus rhythm with a rate of 74  Axis is   Normal  QTc is  normal  Intervals and Durations are unremarkable. ST Segments: no acute change and nonspecific changes  Delta waves, Brugada Syndrome, and Short MI are not present. Prior EKG to compare with was available. No significant changes compared to prior EKG from November 9, 2019        RADIOLOGY  X-RAYS:  I have reviewed radiologic plain film image(s). ALL OTHER NON-PLAIN FILM IMAGES SUCH AS CT, ULTRASOUND AND MRI HAVE BEEN READ BY THE RADIOLOGIST. XR CHEST (2 VW)   Final Result   1. Cardiomegaly, with mild central venous congestion   2. Faint nodule left upper lobe, unchanged, likely postinflammatory in   etiology.               Chest X-Ray  Interpreted by: Emergency Department Physician  View: Portable chest xray  Findings: Congestive heart failure, Cardiomegaly    LABS  Results for orders placed or performed during the hospital encounter of 03/14/21   CBC Auto Differential   Result Value Ref Range    WBC 7.4 4.0 - 11.0 K/uL    RBC 4.54 4.00 - 5.20 M/uL    Hemoglobin 14.4 12.0 - 16.0 g/dL    Hematocrit 43.4 36.0 - 48.0 %    MCV 95.7 80.0 - 100.0 fL    MCH 31.7 26.0 - 34.0 pg    MCHC 33.1 31.0 - 36.0 g/dL    RDW 14.4 12.4 - 15.4 %    Platelets 676 642 - 272 K/uL    MPV 8.9 5.0 - 10.5 fL    Neutrophils % 69.1 %    Lymphocytes % 16.7 %    Monocytes % 9.9 %    Eosinophils % 2.9 %    Basophils % 1.4 %    Neutrophils Absolute 5.1 1.7 - 7.7 K/uL    Lymphocytes Absolute 1.2 1.0 - 5.1 K/uL    Monocytes Absolute 0.7 0.0 - 1.3 K/uL    Eosinophils Absolute 0.2 0.0 - 0.6 K/uL    Basophils Absolute 0.1 0.0 - 0.2 K/uL   Comprehensive Metabolic Panel   Result Value Ref Range    Sodium 141 136 - 145 mmol/L    Potassium 5.1 3.5 - 5.1 mmol/L    Chloride 102 99 - 110 mmol/L    CO2 33 (H) 21 - 32 mmol/L    Anion Gap 6 3 - 16    Glucose 100 (H) 70 - 99 mg/dL    BUN 14 7 - 20 mg/dL    CREATININE 1.0 0.6 - 1.1 mg/dL    GFR Non-African American 58 (A) >60    GFR African American >60 >60    Calcium 9.0 8.3 - 10.6 mg/dL    Total Protein 6.4 6.4 - 8.2 g/dL    Albumin 3.6 3.4 - 5.0 g/dL    Albumin/Globulin Ratio 1.3 1.1 - 2.2    Total Bilirubin 0.4 0.0 - 1.0 mg/dL    Alkaline Phosphatase 64 40 - 129 U/L    ALT 13 10 - 40 U/L    AST 20 15 - 37 U/L    Globulin 2.8 g/dL   Urinalysis Reflex to Culture    Specimen: Urine, clean catch   Result Value Ref Range    Color, UA Yellow Straw/Yellow    Clarity, UA Clear Clear    Glucose, Ur Negative Negative mg/dL    Bilirubin Urine Negative Negative    Ketones, Urine Negative Negative mg/dL    Specific Gravity, UA 1.020 1.005 - 1.030    Blood, Urine Negative Negative    pH, UA 7.0 5.0 - 8.0    Protein, UA Negative Negative mg/dL Urobilinogen, Urine 1.0 <2.0 E.U./dL    Nitrite, Urine Negative Negative    Leukocyte Esterase, Urine Negative Negative    Microscopic Examination Not Indicated     Urine Type NotGiven     Urine Reflex to Culture Not Indicated    Sample possible blood bank testing   Result Value Ref Range    Specimen Status ONEL    Brain Natriuretic Peptide   Result Value Ref Range    Pro-BNP 3,752 (H) 0 - 124 pg/mL   EKG 12 Lead   Result Value Ref Range    Ventricular Rate 74 BPM    Atrial Rate 74 BPM    P-R Interval 154 ms    QRS Duration 88 ms    Q-T Interval 310 ms    QTc Calculation (Bazett) 344 ms    P Axis 59 degrees    R Axis 65 degrees    T Axis 45 degrees    Diagnosis       Normal sinus rhythmNonspecific T wave abnormalityAbnormal ECGWhen compared with ECG of 09-NOV-2019 17:44,Nonspecific T wave abnormality has replaced inverted T waves in Lateral leadsQT has shortened       SCREENINGS  NIH Score     [facility]     [facility]    Heart Score       PROCEDURES    MEDICAL DECISION MAKING    Procedures/interventions/images ordered for this visit  Orders Placed This Encounter   Procedures    CBC Auto Differential    Comprehensive Metabolic Panel    Urinalysis Reflex to Culture    Saline lock IV       Medications ordered for this visit  No orders of the defined types were placed in this encounter. ED course notes for this visit       I wore N95 Envo mask with filter protection, facial shield and gloves when I evaluated the patient. I evaluated the patient in room 20/20    - Diagnostic studies reviewed and results discussed with patient  - Incidental findings of Audio megaly and likely new onset congestive heart failure also discussed  - We agreed to treat CHF with Lasix    I have recommended admission to the hospital and/or further workup, but the patient refuses.  The risks (including but not limited to suffering, loss of limb, loss of wages, loss of sexual function and death) as well as the benefits were explained to to return to have their evaluation completed as we are glad to do so. I have also instructed the patient on the importance of follow-up and to return for any worsening or worrisome concerns. The patient appears competent to make medical decisions at this time. Pt is in stable condition upon Discharge to home. The note was completed using Dragon voice recognition transcription. Every effort was made to ensure accuracy; however, inadvertent transcription errors may be present despite my best efforts to edit errors.     Jade Victoria MD  [facility]        Jade Victoria MD  03/15/21 0000       Jade Victoria MD  03/15/21 6931

## 2021-03-15 NOTE — ED NOTES
Pt ambulated 50+ feet. HR 80\"s. O2 sat fluctuated between 88-93% when ambulating.        Richardson Gottron, RN  03/14/21 7917

## 2021-03-26 NOTE — PROGRESS NOTES
INHALE 2 PUFFS BY MOUTH TWO TIMES A DAY 9/22/20  Yes Martin Singh MD   albuterol sulfate HFA (PROAIR HFA) 108 (90 Base) MCG/ACT inhaler Inhale 2 puffs into the lungs every 6 hours as needed for Wheezing or Shortness of Breath 9/22/20  Yes Martin Singh MD   albuterol (PROVENTIL) (2.5 MG/3ML) 0.083% nebulizer solution Take 3 mLs by nebulization every 6 hours as needed for Wheezing or Shortness of Breath DX COPD J44.9 9/22/20  Yes Martin Singh MD   tiotropium (SPIRIVA RESPIMAT) 2.5 MCG/ACT AERS inhaler Inhale 2 puffs into the lungs daily 9/22/20  Yes Martin Singh MD   meclizine (ANTIVERT) 25 MG tablet Take 25 mg by mouth 3 times daily as needed PRN   Yes Historical Provider, MD   Cholecalciferol (VITAMIN D3) 1.25 MG (98370 UT) CAPS Take by mouth once a week   Yes Historical Provider, MD   oxybutynin (DITROPAN-XL) 5 MG extended release tablet TAKE 1 TABLET BY MOUTH ONE TIME A DAY 6/28/19  Yes Historical Provider, MD   buPROPion (WELLBUTRIN) 100 MG tablet Take 100 mg by mouth 2 times daily   Yes Historical Provider, MD   hydrOXYzine (VISTARIL) 50 MG capsule Take 25 mg by mouth nightly    Yes Historical Provider, MD   QUEtiapine Fumarate (SEROQUEL PO) Take 25 mg by mouth daily    Historical Provider, MD        CURRENT Medications:  No current facility-administered medications for this visit. Allergies:  Acetaminophen-codeine, Darvocet [propoxyphene n-acetaminophen], Eggs or egg-derived products, Percocet [oxycodone-acetaminophen], Shrimp flavor, and Vicodin [hydrocodone-acetaminophen]     Review of Systems:   A 14 point review of symptoms completed. Pertinent positives identified in the HPI, all other review of symptoms negative as below.       Objective:     Vitals:    04/01/21 1446   BP: 136/66   Pulse: 75   SpO2: 95%    Weight: (!) 304 lb (137.9 kg)       PHYSICAL EXAM:    General:  Alert, cooperative, no distress, appears stated age   Head:  Normocephalic, atraumatic   Eyes:  Conjunctiva/corneas clear, anicteric Value Date    TROPONINI <0.01 11/09/2019     No results found for: CHOL  No results found for: TRIG  No results found for: HDL  No results found for: LDLCHOLESTEROL, LDLCALC  No results found for: LABVLDL, VLDL  No results found for: CHOLHDLRATIO      Cardiac Data:     EKG: 3/14/21  Normal sinus rhythm Nonspecific ST abnormality 74 bpm    No prior cardiac testing otherwise. Chest x-ray 3/14/21  1. Cardiomegaly, with mild central venous congestion   2. Faint nodule left upper lobe, unchanged, likely postinflammatory in etiology. Impression and Plan:   Ms. Dorothy Leon is a pleasant 79-year-old woman with a prior history significant for asthma, chronic obstructive pulmonary disease, bipolar disease and tobacco abuse. Presents today for evaluation for suspected congestive heart failure. Patient presents today endorsing symptoms and signs overall consistent with congestive heart failure, new diagnosis. Lasix low-dose has helped her symptoms but not given her complete relief. She has evidence of continued volume overload by exam today. EF unknown. Proceed as below:    1. Acute congestive heart failure, new diagnosis, EF unknown  2. Shortness of breath  3.  Elevated blood pressure in the setting of volume overload  4. Chronic obstructive pulmonary disease  5. Tobacco abuse, ongoing    Patient Active Problem List   Diagnosis Code    Edema of both legs R60.0    Shortness of breath R06.02    Acute diastolic congestive heart failure ([address] I50.31     PLAN:  1. Increase Lasix to 40 mg daily   2. Obtain complete metabolic profile and proBNP today. 3.  Obtain transthoracic echocardiogram to evaluate underlying cardiac structure and function. 4.  Patient states she does not have a weight scale available at home and does not have the means to obtain 1 at this time to track daily weights. 5.  We discussed low-salt diet and its benefits in the setting of suspected heart failure.   6.  We strongly advised smoking cessation    Follow up in 2-3 weeks with repeat labs at that time; further work-up pending echo results    The scribes documentation has been prepared under my direction and personally reviewed by me in its entirety. I confirm that the note above accurately reflects all work, treatment, procedures, and medical decision making performed by me. Kristen Dang MD, personally performed the services described in this documentation as scribed by Anselmo Wagner RN in my presence, and it is both accurate and complete to the best of our ability. I will address the patient's cardiac risk factors and adjusted pharmacologic treatment as needed. In addition, I have reinforced the need for patient directed risk factor modification. All questions and concerns were addressed to the patient/family. Alternatives to my treatment were discussed. Thank you for allowing us to participate in the care of Namita Brunson. Please call me with any questions [phone].     Rafaela Goncalves MD, [address] [facility]  [phone] [facility]  [phone] [address] [place] [address]  4/1/2021 3:11 PM

## 2021-04-01 ENCOUNTER — OFFICE VISIT (OUTPATIENT)
Dept: CARDIOLOGY CLINIC | Age: 56
End: 2021-04-01
Payer: COMMERCIAL

## 2021-04-01 VITALS
HEIGHT: 67 IN | OXYGEN SATURATION: 95 % | WEIGHT: 293 LBS | BODY MASS INDEX: 45.99 KG/M2 | HEART RATE: 75 BPM | DIASTOLIC BLOOD PRESSURE: 66 MMHG | SYSTOLIC BLOOD PRESSURE: 136 MMHG

## 2021-04-01 DIAGNOSIS — Z87.891 HISTORY OF SMOKING: ICD-10-CM

## 2021-04-01 DIAGNOSIS — R06.02 SHORTNESS OF BREATH: Primary | ICD-10-CM

## 2021-04-01 DIAGNOSIS — R60.0 EDEMA OF ABDOMINAL WALL: ICD-10-CM

## 2021-04-01 DIAGNOSIS — R60.1 GENERALIZED EDEMA: ICD-10-CM

## 2021-04-01 DIAGNOSIS — I50.31 ACUTE DIASTOLIC CONGESTIVE HEART FAILURE (HCC): ICD-10-CM

## 2021-04-01 PROCEDURE — 1036F TOBACCO NON-USER: CPT | Performed by: INTERNAL MEDICINE

## 2021-04-01 PROCEDURE — G8417 CALC BMI ABV UP PARAM F/U: HCPCS | Performed by: INTERNAL MEDICINE

## 2021-04-01 PROCEDURE — G8427 DOCREV CUR MEDS BY ELIG CLIN: HCPCS | Performed by: INTERNAL MEDICINE

## 2021-04-01 PROCEDURE — 3017F COLORECTAL CA SCREEN DOC REV: CPT | Performed by: INTERNAL MEDICINE

## 2021-04-01 PROCEDURE — 99205 OFFICE O/P NEW HI 60 MIN: CPT | Performed by: INTERNAL MEDICINE

## 2021-04-01 RX ORDER — FUROSEMIDE 40 MG/1
40 TABLET ORAL DAILY
Qty: 30 TABLET | Refills: 2 | Status: SHIPPED | OUTPATIENT
Start: 2021-04-01 | End: 2021-06-17

## 2021-04-01 NOTE — PATIENT INSTRUCTIONS
1. CMP and BNP today  2. Increase lasix to 40 daily  3. Keep follow up with Santy NP  4. Call [phone] to schedule echo  5.  Follow up in 2-3 weeks

## 2021-04-21 RX ORDER — TIOTROPIUM BROMIDE INHALATION SPRAY 3.12 UG/1
SPRAY, METERED RESPIRATORY (INHALATION)
Qty: 4 G | Refills: 5 | Status: SHIPPED | OUTPATIENT
Start: 2021-04-21 | End: 2021-05-06 | Stop reason: SDUPTHER

## 2021-05-06 ENCOUNTER — TELEPHONE (OUTPATIENT)
Dept: PULMONOLOGY | Age: 56
End: 2021-05-06

## 2021-05-06 ENCOUNTER — VIRTUAL VISIT (OUTPATIENT)
Dept: PULMONOLOGY | Age: 56
End: 2021-05-06
Payer: COMMERCIAL

## 2021-05-06 ENCOUNTER — HOSPITAL ENCOUNTER (OUTPATIENT)
Dept: CARDIOLOGY | Age: 56
Discharge: HOME | End: 2021-05-06
Payer: COMMERCIAL

## 2021-05-06 DIAGNOSIS — J44.9 CHRONIC OBSTRUCTIVE PULMONARY DISEASE, UNSPECIFIED COPD TYPE (HCC): Primary | ICD-10-CM

## 2021-05-06 DIAGNOSIS — J45.40 MODERATE PERSISTENT ASTHMA WITHOUT COMPLICATION: ICD-10-CM

## 2021-05-06 DIAGNOSIS — R60.1 GENERALIZED EDEMA: ICD-10-CM

## 2021-05-06 DIAGNOSIS — R93.89 ABNORMAL CXR: ICD-10-CM

## 2021-05-06 DIAGNOSIS — J30.9 ALLERGIC RHINITIS, UNSPECIFIED SEASONALITY, UNSPECIFIED TRIGGER: ICD-10-CM

## 2021-05-06 DIAGNOSIS — Z87.891 HISTORY OF SMOKING: ICD-10-CM

## 2021-05-06 DIAGNOSIS — R60.0 EDEMA OF ABDOMINAL WALL: ICD-10-CM

## 2021-05-06 DIAGNOSIS — R06.02 SHORTNESS OF BREATH: ICD-10-CM

## 2021-05-06 LAB
LV EF: 55 %
LVEF MODALITY: NORMAL

## 2021-05-06 PROCEDURE — 99443 PR PHYS/QHP TELEPHONE EVALUATION 21-30 MIN: CPT | Performed by: INTERNAL MEDICINE

## 2021-05-06 PROCEDURE — 93306 TTE W/DOPPLER COMPLETE: CPT

## 2021-05-06 RX ORDER — BUDESONIDE AND FORMOTEROL FUMARATE DIHYDRATE 160; 4.5 UG/1; UG/1
AEROSOL RESPIRATORY (INHALATION)
Qty: 1 INHALER | Refills: 5 | Status: SHIPPED | OUTPATIENT
Start: 2021-05-06 | End: 2021-11-24

## 2021-05-06 RX ORDER — BUPROPION HYDROCHLORIDE 300 MG/1
TABLET ORAL
COMMUNITY
Start: 2021-04-29

## 2021-05-06 RX ORDER — ALBUTEROL SULFATE 90 UG/1
2 AEROSOL, METERED RESPIRATORY (INHALATION) EVERY 6 HOURS PRN
Qty: 1 INHALER | Refills: 5 | Status: SHIPPED | OUTPATIENT
Start: 2021-05-06 | End: 2021-11-24

## 2021-05-06 RX ORDER — ALBUTEROL SULFATE 2.5 MG/3ML
2.5 SOLUTION RESPIRATORY (INHALATION) EVERY 6 HOURS PRN
Qty: 120 VIAL | Refills: 5 | Status: SHIPPED | OUTPATIENT
Start: 2021-05-06 | End: 2021-11-24

## 2021-05-06 NOTE — TELEPHONE ENCOUNTER
After visit care:    CT chest soon-watch for results  Follow up 3 months    Scheduled appts, L/M asking pt to return call to inform of dates/times.

## 2021-05-06 NOTE — PROGRESS NOTES
[facility]                                                            Outpatient Follow Up Note  TELEHEALTH EVALUATION: Service performed was Audio (During [address] emergency) and not a face-to-face visit       CHIEF COMPLAINT: Follow up COPD       HPI:   Breathing is pretty good  Patient is compliant with inhaled bronchodilators   Some cough with little sputum  No hemoptysis   No smoking   Uses Albuterol 3 times/week        Past Medical History:   Diagnosis Date    Allergic rhinitis     Anxiety     Asthma     [facility] [address]     COPD (chronic obstructive pulmonary disease) ([facility] [address]     Depression     Eczema     Pulmonary nodule     Tobacco abuse        Past Surgical History:        Procedure Laterality Date    BRONCHOSCOPY      TONSILLECTOMY         Allergies:  is allergic to acetaminophen-codeine; darvocet [propoxyphene n-acetaminophen]; eggs or egg-derived products; percocet [oxycodone-acetaminophen]; shrimp flavor; and vicodin [hydrocodone-acetaminophen]. Social History:    TOBACCO:   reports that she quit smoking about 4 years ago. Her smoking use included cigarettes. She has a 68.00 pack-year smoking history. She has never used smokeless tobacco.  ETOH:   reports no history of alcohol use.       Family History:       Problem Relation Age of Onset    Diabetes Mother     Hypertension Father     Asthma Neg Hx     Cancer Neg Hx     Emphysema Neg Hx     Heart Failure Neg Hx        Current Medications:    Current Outpatient Medications:     SPIRIVA RESPIMAT 2.5 MCG/ACT AERS inhaler, INHALE 2 PUFFS BY MOUTH EVERY DAY , Disp: 4 g, Rfl: 5    furosemide (LASIX) 40 MG tablet, Take 1 tablet by mouth daily, Disp: 30 tablet, Rfl: 2    loratadine (CLARITIN) 10 MG tablet, Take 1 tablet by mouth daily, Disp: 30 tablet, Rfl: 6    metFORMIN (GLUCOPHAGE) 850 MG tablet, , Disp: , Rfl:     famotidine (PEPCID)

## 2021-05-07 ENCOUNTER — HOSPITAL ENCOUNTER (OUTPATIENT)
Age: 56
Discharge: HOME | End: 2021-05-07
Payer: COMMERCIAL

## 2021-05-07 ENCOUNTER — OFFICE VISIT (OUTPATIENT)
Dept: CARDIOLOGY CLINIC | Age: 56
End: 2021-05-07
Payer: COMMERCIAL

## 2021-05-07 VITALS
SYSTOLIC BLOOD PRESSURE: 127 MMHG | DIASTOLIC BLOOD PRESSURE: 69 MMHG | HEIGHT: 67 IN | BODY MASS INDEX: 45.52 KG/M2 | OXYGEN SATURATION: 95 % | WEIGHT: 290 LBS | HEART RATE: 70 BPM

## 2021-05-07 DIAGNOSIS — I50.31 ACUTE DIASTOLIC CONGESTIVE HEART FAILURE (HCC): Primary | ICD-10-CM

## 2021-05-07 DIAGNOSIS — J44.9 CHRONIC OBSTRUCTIVE PULMONARY DISEASE, UNSPECIFIED COPD TYPE (HCC): ICD-10-CM

## 2021-05-07 DIAGNOSIS — I50.31 ACUTE DIASTOLIC CONGESTIVE HEART FAILURE (HCC): ICD-10-CM

## 2021-05-07 DIAGNOSIS — R60.0 EDEMA OF BOTH LEGS: ICD-10-CM

## 2021-05-07 DIAGNOSIS — Z72.0 TOBACCO ABUSE: ICD-10-CM

## 2021-05-07 LAB
ANION GAP SERPL CALCULATED.3IONS-SCNC: 8 MMOL/L (ref 3–16)
BUN BLDV-MCNC: 17 MG/DL (ref 7–20)
CALCIUM SERPL-MCNC: 8.7 MG/DL (ref 8.3–10.6)
CHLORIDE BLD-SCNC: 102 MMOL/L (ref 99–110)
CO2: 29 MMOL/L (ref 21–32)
CREAT SERPL-MCNC: 1 MG/DL (ref 0.6–1.1)
GFR AFRICAN AMERICAN: >60
GFR NON-AFRICAN AMERICAN: 58
GLUCOSE BLD-MCNC: 129 MG/DL (ref 70–99)
POTASSIUM SERPL-SCNC: 4.1 MMOL/L (ref 3.5–5.1)
PRO-BNP: 2576 PG/ML (ref 0–124)
SODIUM BLD-SCNC: 139 MMOL/L (ref 136–145)

## 2021-05-07 PROCEDURE — G8427 DOCREV CUR MEDS BY ELIG CLIN: HCPCS | Performed by: INTERNAL MEDICINE

## 2021-05-07 PROCEDURE — 99214 OFFICE O/P EST MOD 30 MIN: CPT | Performed by: INTERNAL MEDICINE

## 2021-05-07 PROCEDURE — G8926 SPIRO NO PERF OR DOC: HCPCS | Performed by: INTERNAL MEDICINE

## 2021-05-07 PROCEDURE — 3017F COLORECTAL CA SCREEN DOC REV: CPT | Performed by: INTERNAL MEDICINE

## 2021-05-07 PROCEDURE — 80048 BASIC METABOLIC PNL TOTAL CA: CPT

## 2021-05-07 PROCEDURE — G8417 CALC BMI ABV UP PARAM F/U: HCPCS | Performed by: INTERNAL MEDICINE

## 2021-05-07 PROCEDURE — 1036F TOBACCO NON-USER: CPT | Performed by: INTERNAL MEDICINE

## 2021-05-07 PROCEDURE — 83880 ASSAY OF NATRIURETIC PEPTIDE: CPT

## 2021-05-07 PROCEDURE — 36415 COLL VENOUS BLD VENIPUNCTURE: CPT

## 2021-05-07 PROCEDURE — 3023F SPIROM DOC REV: CPT | Performed by: INTERNAL MEDICINE

## 2021-05-07 NOTE — PROGRESS NOTES
CARDIOLOGY FOLLOW UP        Patient Name: Arcenio Powers  Primary Care physician: Briana Lemon RN, NP    Reason for Referral/Chief Complaint: Arcenio Powers is a 50 y.o. patient who is referred to cardiology clinic today for evaluation and treatment of congestive heart failure with preserved ejection fraction. History of Present Illness:   Ms. Laurel Halsted is a pleasant 73-year-old woman with a prior history significant for asthma, chronic obstructive pulmonary disease, bipolar disease, tobacco abuse, obesity and congestive heart failure with preserved ejection fraction. The patient presented to ED on 3/14/21 with bilateral lower extremity swelling, swelling of the lower abdomen, and dyspnea on exertion. EKG showed normal sinus rhythm with nonpecific ST abnormality. Chest x-ray demonstrated cardiomegaly, with mild central venous congestion. Pro-BNP elevated at 3,752. It was documented that she desatted to 88 to 93% with ambulation. She was recommended admission but declined. Echo was ordered but not completed. She was sent home with 20 mg oral Lasix daily. Weight was noted 304 pounds at the time. Patient was last evaluated on 4/1/2021 at which time she complained of worsening signs/symptoms of congestion. Stated Lasix was not given her brisk diuresis. Her Lasix was increased to 40 mg p.o. daily and echocardiogram was ordered. In interim, transthoracic echocardiogram 5/6/2021 showed 55% EF, mild left ventricular hypertrophy, right ventricular pressure overload, mild right ventricular dilation with normal function, mild right atrial dilation. Her PASP was noted elevated at 54 mmHg (RA pressure of 3 mmHg). Today, she reports losing 11 pounds since starting the Lasix at a higher dose. This gives her brisk urination. She reports that her shortness of breath and suffocating sensation while she is sleeping have improved.   She did notice that her heart was racing while vacuuming on one occurrence as well as another occurrence while walking in a parking lot. She sat down and calmed down and it resolved. She denies lightheadedness/presyncope at that time. No carmelina syncope. Patient denies chest pain/pressure/heaviness. She does not lie flat to sleep, sleeping on her right side due to breathing. Taking her medications as prescribed. She admits to drinking almost a 2L of diet coke per day. She also eats a lot of ice chips. 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 Patient is quit smoking cigarettes. Utilizing electronic cigarette at this time. Past Medical History:   has a past medical history of Allergic rhinitis, Anxiety, Asthma, Bipolar disorder, mixed ([address] COPD (chronic obstructive pulmonary disease) ([facility] [address] Depression, Eczema, Pulmonary nodule, and Tobacco abuse. Surgical History:   has a past surgical history that includes bronchoscopy and Tonsillectomy. Social History:   reports that she quit smoking about 4 years ago. Her smoking use included cigarettes. She has a 68.00 pack-year smoking history. She has never used smokeless tobacco. She reports that she does not drink alcohol or use drugs. She started smoking when she was 17 yrs old for 30 years. She smoked over a pack a day. Family History:  family history includes Diabetes in her mother; Hypertension in her father. Mother Presented with sudden cardiac death at [facility] and was revived, found to have heart disease in her 52's. Passed away in 60's. She had history of coronary artery bypass and prior PCI. Father has no know heart disease at 68  Sister without heart disease  She has a second sister who she does not associate with and does not know her history. Home Medications:  Were reviewed and are listed in nursing record and/or below  Prior to Admission medications    Medication Sig Start Date End Date Taking?  Authorizing Provider buPROPion (WELLBUTRIN XL) 300 MG extended release tablet  4/29/21  Yes Historical Provider, MD   tiotropium (SPIRIVA RESPIMAT) 2.5 MCG/ACT AERS inhaler INHALE 2 PUFFS BY MOUTH EVERY DAY 5/6/21  Yes Mae Thrasher MD   budesonide-formoterol (SYMBICORT) 160-4.5 MCG/ACT AERO INHALE 2 PUFFS BY MOUTH TWO TIMES A DAY 5/6/21  Yes Mae Thrasher MD   albuterol sulfate HFA (PROAIR HFA) 108 (90 Base) MCG/ACT inhaler Inhale 2 puffs into the lungs every 6 hours as needed for Wheezing or Shortness of Breath 5/6/21  Yes Mae Thrasher MD   albuterol (PROVENTIL) (2.5 MG/3ML) 0.083% nebulizer solution Take 3 mLs by nebulization every 6 hours as needed for Wheezing or Shortness of Breath DX COPD J44.9 5/6/21  Yes Mae hTrasher MD   furosemide (LASIX) 40 MG tablet Take 1 tablet by mouth daily 4/1/21 5/7/21 Yes Katarina Monday, MD   loratadine (CLARITIN) 10 MG tablet Take 1 tablet by mouth daily 12/17/20  Yes Mae Thrasher MD   metFORMIN (GLUCOPHAGE) 850 MG tablet  12/3/20  Yes Historical Provider, MD   famotidine (PEPCID) 20 MG tablet TAKE 1 TABLET BY MOUTH TWO TIMES A DAY 11/11/20  Yes Historical Provider, MD   risperiDONE (RISPERDAL) 1 MG tablet  12/3/20  Yes Historical Provider, MD   fluticasone (FLONASE) 50 MCG/ACT nasal spray USE 2 SPRAYS INTO EACH NOSTRIL ONCE DAILY 11/12/20  Yes Mae Thrasher MD   meclizine (ANTIVERT) 25 MG tablet Take 25 mg by mouth 3 times daily as needed PRN   Yes Historical Provider, MD   Cholecalciferol (VITAMIN D3) 1.25 MG (36460 UT) CAPS Take by mouth once a week   Yes Historical Provider, MD   oxybutynin (DITROPAN-XL) 5 MG extended release tablet TAKE 1 TABLET BY MOUTH ONE TIME A DAY 6/28/19  Yes Historical Provider, MD   hydrOXYzine (VISTARIL) 50 MG capsule Take 25 mg by mouth nightly    Yes Historical Provider, MD   QUEtiapine Fumarate (SEROQUEL PO) Take 25 mg by mouth daily    Historical Provider, MD        CURRENT Medications:  No current facility-administered medications for this visit. Allergies:  Acetaminophen-codeine, Darvocet [propoxyphene n-acetaminophen], Eggs or egg-derived products, Percocet [oxycodone-acetaminophen], Shrimp flavor, and Vicodin [hydrocodone-acetaminophen]     Review of Systems:   A 14 point review of symptoms completed. Pertinent positives identified in the HPI, all other review of symptoms negative as below. Objective:     Vitals:    05/07/21 1431   BP: 127/69   Pulse: 70   SpO2: 95%    Weight: 290 lb (131.5 kg)       PHYSICAL EXAM:    General:  Alert, cooperative, no distress, appears stated age   Head:  Normocephalic, atraumatic   Eyes:  Conjunctiva/corneas clear, anicteric sclerae    Nose: Nares normal, no drainage or sinus tenderness   Throat: No abnormalities of the lips, oral mucosa or tongue. Neck: Trachea midline. Neck supple with no lymphadenopathy, thyroid not enlarged, symmetric, no tenderness/mass/nodules, JVP does not appear elevated today. Lungs:    Wheezing throughout all lung fields, no rales, no respiratory distress   Chest Wall:  No deformity or tenderness to palpation   Heart:  Regular rate and rhythm, normal S1, normal S2, 2/6 murmur over the left sternal border, no rub, no S3/S4, PMI non-palpable   Abdomen:    Obese, soft, non-tender, with normoactive bowel sounds. No masses, no hepatosplenomegaly. Trace edema of lower abdomen wall   Extremities: No cyanosis, clubbing, extremely dry lower extremity, mild edema of ankles bilaterally. Varicose veins. Vascular: 2+ radial, decreased dorsalis pedis and posterior tibial pulses bilaterally. Brisk carotid upstrokes without carotid bruit. Skin: Skin color, texture, turgor are normal with no rashes or ulceration. Dry skin. Pysch: Euthymic mood, appropriate affect   Neurologic: Oriented to person, place and time. No slurred speech or facial asymmetry. No motor or sensory deficits on gross examination.          Labs:   CBC:   Lab Results   Component Value Date    WBC 7.4 03/14/2021    RBC 4.54 03/14/2021    HGB 14.4 03/14/2021    HCT 43.4 03/14/2021    MCV 95.7 03/14/2021    RDW 14.4 03/14/2021     03/14/2021     CMP:  Lab Results   Component Value Date     03/14/2021    K 5.1 03/14/2021     03/14/2021    CO2 33 03/14/2021    BUN 14 03/14/2021    CREATININE 1.0 03/14/2021    GFRAA >60 03/14/2021    GFRAA >60 09/10/2010    AGRATIO 1.3 03/14/2021    LABGLOM 58 03/14/2021    GLUCOSE 100 03/14/2021    PROT 6.4 03/14/2021    PROT 7.8 09/10/2010    CALCIUM 9.0 03/14/2021    BILITOT 0.4 03/14/2021    ALKPHOS 64 03/14/2021    AST 20 03/14/2021    ALT 13 03/14/2021     PT/INR:  No results found for: PTINR  HgBA1c:No results found for: LABA1C  Lab Results   Component Value Date    TROPONINI <0.01 11/09/2019     No results found for: CHOL  No results found for: TRIG  No results found for: HDL  No results found for: LDLCHOLESTEROL, LDLCALC  No results found for: LABVLDL, VLDL  No results found for: CHOLHDLRATIO      Cardiac Data:     EKG: 3/14/21  Normal sinus rhythm Nonspecific ST abnormality 74 bpm    Echo: 5/6/2021   Summary   Technically difficult examination. Normal systolic function with an estimated ejection fraction of 55%. Mild concentric left ventricular hypertrophy. Flattened in systole consistent with right ventricular pressure overload. Normal left ventricular diastolic filling pressure. The right ventricle appears mildly dilated with normal function. The right atrium is mildly dilated. MIld pulmonic and tricuspid regurgitation. Systolic pulmonary artery pressure (SPAP) estimated at 54 mmHg (right atrial   pressure 3 mmHg), consistent with mild pulmonary hypertension. Chest x-ray 3/14/21  1. Cardiomegaly, with mild central venous congestion   2. Faint nodule left upper lobe, unchanged, likely postinflammatory in etiology.        Impression and Plan:   Ms. Leona Potter is a pleasant 62-year-old woman with a prior history significant for asthma, chronic obstructive pulmonary disease, bipolar disease, tobacco abuse, obesity and congestive heart failure with preserved ejection fraction. Patient presents today noting 11 pound weight loss, 14 pounds since our last visit, since increasing Lasix to 40 mg daily. Appears her symptoms are improving. She still has dyspnea with exertion and wheezing by exam which I suspect are due to obstructive lung disease. He was educated to start using her nebulizer as directed. BP is controlled. Continue present diuretic dose and will assess labs today. 1.  Chronic congestive heart failure, with preserved ejection fraction  2. Shortness of breath  3. Moderate pulmonary hypertension  4. Chronic obstructive pulmonary disease  5. Asthma   6. Prior smoker  7. Obesity    Patient Active Problem List   Diagnosis Code    Edema of both legs R60.0    Shortness of breath R06.02    Acute diastolic congestive heart failure ([facility] [address] I50.31     PLAN:  1. We will check labs today: BNP to assess fluid and BMP  2. Continue Lasix 40 mg daily for now. Reassess after labs return. 3. Limit salt intake to 2GM per day-attempted to educate the patient today, will need frequent repeat discussions to continue to educate. Advised that she decrease her Diet Coke intake which is near 2 L/day. 4. We encouraged modest weight loss through implementing appropriate dietary measures as well as initiation of a graded exercise program with the ultimate goal of 150 minutes of aerobic exercise weekly. 5.  Follow-up with pulmonary/Dr. Bessie Sifuentes as scheduled. I did encourage her to utilize her nebulizer today as I feel her wheezing on exam is likely due to obstructive lung disease in the absence of overt volume overload by exam today. Follow up in 6 weeks with SEJAL Shoemaker for congestive heart failure. Follow-up with me in 3 months time. We will call with results of labs as they return.   If shortness of breath with exertion continues to prohibit her activity, we can consider noninvasive stress with Lexiscan. However, at this time, I feel like her obstructive lung disease and heart failure with preserved EF are responsible for her symptomatology. This note was scribed in the presence of Giovanna Gerardo MD by Wagner Reeves RN. The scribes documentation has been prepared under my direction and personally reviewed by me in its entirety. I confirm that the note above accurately reflects all work, treatment, procedures, and medical decision making performed by me. Main Antonio MD, personally performed the services described in this documentation as scribed by Wagner Reeves RN in my presence, and it is both accurate and complete to the best of our ability. I will address the patient's cardiac risk factors and adjusted pharmacologic treatment as needed. In addition, I have reinforced the need for patient directed risk factor modification. All questions and concerns were addressed to the patient/family. Alternatives to my treatment were discussed. Thank you for allowing us to participate in the care of Benji Brown. Please call me with any questions [phone].     Maritza Martinez MD, [facility]  Cardiovascular Disease  [address]  [phone] [facility]  [phone] [facility]  5/7/2021 2:51 PM

## 2021-05-07 NOTE — PATIENT INSTRUCTIONS
1. Labs: BNP to assess fluid and BMP  2. Continue current course  3. Limit salt intake to 2GM per day  4. Drink when thirsty. Do not over drink. Decrease diet coke intake  5. Use nebulizer and inhalers as directed by Dr. Feli Damon    Follow up in 6 weeks with RB NP and in 3 months with me to reassess CHF.

## 2021-05-13 ENCOUNTER — TELEPHONE (OUTPATIENT)
Dept: CARDIOLOGY CLINIC | Age: 56
End: 2021-05-13

## 2021-05-13 DIAGNOSIS — I50.31 ACUTE DIASTOLIC CONGESTIVE HEART FAILURE (HCC): Primary | ICD-10-CM

## 2021-05-14 NOTE — TELEPHONE ENCOUNTER
LMOVM since it's Friday late and left message to call back and let me know that she received message.     Labs ordered

## 2021-05-14 NOTE — TELEPHONE ENCOUNTER
Chart reviewed. Let's please have her increase her Lasix to 40 mg BID x3 days and then have her return to taking 40 mg daily. We will also need to repeat her renal panel and magnesium level in one week to monitor her creatinine and electrolytes following this adjustment. If she feels like she is having issues with fluid retention or shortness of breath, when can bring her back for an appointment next week to be re-evaluated. Thanks.

## 2021-05-18 ENCOUNTER — HOSPITAL ENCOUNTER (OUTPATIENT)
Dept: CT IMAGING | Age: 56
Discharge: HOME | End: 2021-05-18
Payer: COMMERCIAL

## 2021-05-18 DIAGNOSIS — R93.89 ABNORMAL CXR: ICD-10-CM

## 2021-05-20 NOTE — TELEPHONE ENCOUNTER
Spoke with pt, she says she is doing ok. No SOB. Edema in her feet when she is on them. She said she can be reached at [phone]. She would like DEJA to call her back.

## 2021-05-20 NOTE — TELEPHONE ENCOUNTER
Spoke with pt. To continue once daily dosing lasix, monitor edema, obtain scale to track dailiy weights. Will see SEJAL BB 6/22.    GABINO

## 2021-05-21 RX ORDER — FLUTICASONE PROPIONATE 50 MCG
SPRAY, SUSPENSION (ML) NASAL
Qty: 16 G | Refills: 5 | Status: SHIPPED | OUTPATIENT
Start: 2021-05-21 | End: 2021-11-24

## 2021-05-21 NOTE — TELEPHONE ENCOUNTER
Patient notified of Ativan the day of CT scan. Patient would like to think about it and return call to office on 5/24/21.

## 2021-06-17 RX ORDER — FUROSEMIDE 40 MG/1
TABLET ORAL
Qty: 90 TABLET | Refills: 1 | Status: SHIPPED | OUTPATIENT
Start: 2021-06-17 | End: 2022-03-29

## 2021-06-21 ENCOUNTER — HOSPITAL ENCOUNTER (OUTPATIENT)
Age: 56
Discharge: HOME | End: 2021-06-21
Payer: COMMERCIAL

## 2021-06-21 DIAGNOSIS — I50.31 ACUTE DIASTOLIC CONGESTIVE HEART FAILURE (HCC): ICD-10-CM

## 2021-06-21 PROCEDURE — 36415 COLL VENOUS BLD VENIPUNCTURE: CPT

## 2021-06-21 PROCEDURE — 83880 ASSAY OF NATRIURETIC PEPTIDE: CPT

## 2021-06-21 PROCEDURE — 80048 BASIC METABOLIC PNL TOTAL CA: CPT

## 2021-06-22 ENCOUNTER — OFFICE VISIT (OUTPATIENT)
Dept: CARDIOLOGY CLINIC | Age: 56
End: 2021-06-22
Payer: COMMERCIAL

## 2021-06-22 ENCOUNTER — TELEPHONE (OUTPATIENT)
Dept: CARDIOLOGY CLINIC | Age: 56
End: 2021-06-22

## 2021-06-22 VITALS
SYSTOLIC BLOOD PRESSURE: 128 MMHG | WEIGHT: 280.5 LBS | HEIGHT: 67 IN | DIASTOLIC BLOOD PRESSURE: 70 MMHG | BODY MASS INDEX: 44.02 KG/M2 | HEART RATE: 61 BPM | OXYGEN SATURATION: 95 %

## 2021-06-22 DIAGNOSIS — Z87.891 HISTORY OF SMOKING: ICD-10-CM

## 2021-06-22 DIAGNOSIS — I50.32 CHRONIC DIASTOLIC HEART FAILURE (HCC): Primary | ICD-10-CM

## 2021-06-22 DIAGNOSIS — E66.01 CLASS 3 SEVERE OBESITY DUE TO EXCESS CALORIES WITH SERIOUS COMORBIDITY AND BODY MASS INDEX (BMI) OF 40.0 TO 44.9 IN ADULT (HCC): ICD-10-CM

## 2021-06-22 DIAGNOSIS — J44.9 CHRONIC OBSTRUCTIVE PULMONARY DISEASE, UNSPECIFIED COPD TYPE (HCC): ICD-10-CM

## 2021-06-22 LAB
ANION GAP SERPL CALCULATED.3IONS-SCNC: 12 MMOL/L (ref 3–16)
BUN BLDV-MCNC: 15 MG/DL (ref 7–20)
CALCIUM SERPL-MCNC: 9 MG/DL (ref 8.3–10.6)
CHLORIDE BLD-SCNC: 101 MMOL/L (ref 99–110)
CO2: 28 MMOL/L (ref 21–32)
CREAT SERPL-MCNC: 1 MG/DL (ref 0.6–1.1)
GFR AFRICAN AMERICAN: >60
GFR NON-AFRICAN AMERICAN: 58
GLUCOSE BLD-MCNC: 102 MG/DL (ref 70–99)
POTASSIUM SERPL-SCNC: 4.2 MMOL/L (ref 3.5–5.1)
PRO-BNP: 712 PG/ML (ref 0–124)
SODIUM BLD-SCNC: 141 MMOL/L (ref 136–145)

## 2021-06-22 PROCEDURE — 1036F TOBACCO NON-USER: CPT | Performed by: NURSE PRACTITIONER

## 2021-06-22 PROCEDURE — G8926 SPIRO NO PERF OR DOC: HCPCS | Performed by: NURSE PRACTITIONER

## 2021-06-22 PROCEDURE — 3017F COLORECTAL CA SCREEN DOC REV: CPT | Performed by: NURSE PRACTITIONER

## 2021-06-22 PROCEDURE — 3023F SPIROM DOC REV: CPT | Performed by: NURSE PRACTITIONER

## 2021-06-22 PROCEDURE — G8417 CALC BMI ABV UP PARAM F/U: HCPCS | Performed by: NURSE PRACTITIONER

## 2021-06-22 PROCEDURE — G8427 DOCREV CUR MEDS BY ELIG CLIN: HCPCS | Performed by: NURSE PRACTITIONER

## 2021-06-22 PROCEDURE — 99214 OFFICE O/P EST MOD 30 MIN: CPT | Performed by: NURSE PRACTITIONER

## 2021-06-22 NOTE — PATIENT INSTRUCTIONS
1. Continue daily weights   2. Continue current regimen; recommend you increase your lasix to twice a day for a few days and then try to lay flat at home and see if your breathing improves with the extra water pill (to help prep you to lay flat for your CT scan)   3. If your weight goes up 3lbs in a day or 5lbs in a week, then take an extra 40mg lasix that day and recheck weight the next day. 4. Target about 2L fluid in a day; keep fluid intake consistent from day to day  5. Your labs look great and are improving  6. Target lower sodium diet- 2000mg of sodium a day  7.  Follow up as planned

## 2021-06-22 NOTE — TELEPHONE ENCOUNTER
----- Message from Dani Duarte MD sent at 6/22/2021  8:19 AM EDT -----  Labs look very good. Continue diuretic regimen. Follow up as planned.

## 2021-06-22 NOTE — LETTER
[facility]   Cardiac Follow-up    Primary Care Doctor:  Briana Lemon RN, NP    Chief Complaint   Patient presents with    Follow-up        History of Present Illness:   I had the pleasure of seeing Nadeem Urbano in follow up for diastolic heart failure. History of COPD, asthma, bipolar, tobacco abuse, obese. Echo from 5/6/2021 showed normal EF 55%, mild LVH, RV pressure overload. No more significant orthopnea. Breathing is slowly improving. Edema is improved. Sleeping with 2 pills. Sleeps on her right side. She hasn't had chest ct due to inability to lay flat, although she denies feeling smothered. She has been taking the Lasix as prescribed. Her breathing did improve after the increased dose of Lasix last month. Nadeem Urbano describes symptoms including dyspnea, fatigue, edema but denies chest pain, syncope. Appetite: good   Current diet: eats salad, eating out about 1 times a week. Fluid intake: drinks a lot of diet coke which has decreased from 2L to 1Liter a day; also drinks water about 15-30 ounces a day     Home weights: 276-278lbs    Past Medical History:   has a past medical history of Allergic rhinitis, Anxiety, Asthma, Bipolar disorder, mixed ([address] COPD (chronic obstructive pulmonary disease) ([address] COPD (chronic obstructive pulmonary disease) ([address] Depression, Eczema, Pulmonary nodule, and Tobacco abuse. Surgical History:   has a past surgical history that includes bronchoscopy and Tonsillectomy. Social History:   reports that she quit smoking about 5 years ago. Her smoking use included cigarettes. She has a 68.00 pack-year smoking history. She has never used smokeless tobacco. She reports that she does not drink alcohol and does not use drugs.    Family History:   Family History   Problem Relation Age of Onset    Diabetes Mother     Hypertension Father     Asthma Neg Hx     Cancer Neg Hx     Emphysema Neg Hx     Heart Failure Neg Hx        Home Medications:  Prior to Admission medications    Medication Sig Start Date End Date Taking?  Authorizing Provider   furosemide (LASIX) 40 MG tablet TAKE 1 TABLET BY MOUTH EVERY DAY  6/17/21  Yes Edy Schroeder MD   fluticasone (FLONASE) 50 MCG/ACT nasal spray INHALE 2 SPRAYS IN EACH NOSTRIL ONE TIME A DAY  5/21/21  Yes Lenny Mccabe MD   buPROPion (WELLBUTRIN XL) 300 MG extended release tablet  4/29/21  Yes Historical Provider, MD   tiotropium (SPIRIVA RESPIMAT) 2.5 MCG/ACT AERS inhaler INHALE 2 PUFFS BY MOUTH EVERY DAY 5/6/21  Yes Lenny Mccabe MD   budesonide-formoterol (SYMBICORT) 160-4.5 MCG/ACT AERO INHALE 2 PUFFS BY MOUTH TWO TIMES A DAY 5/6/21  Yes Lenny Mccabe MD   albuterol sulfate HFA (PROAIR HFA) 108 (90 Base) MCG/ACT inhaler Inhale 2 puffs into the lungs every 6 hours as needed for Wheezing or Shortness of Breath 5/6/21  Yes Lenny Mccabe MD   albuterol (PROVENTIL) (2.5 MG/3ML) 0.083% nebulizer solution Take 3 mLs by nebulization every 6 hours as needed for Wheezing or Shortness of Breath DX COPD J44.9 5/6/21  Yes Lenny Mccabe MD   loratadine (CLARITIN) 10 MG tablet Take 1 tablet by mouth daily 12/17/20  Yes Lenny Mccabe MD   metFORMIN (GLUCOPHAGE) 850 MG tablet  12/3/20  Yes Historical Provider, MD   famotidine (PEPCID) 20 MG tablet TAKE 1 TABLET BY MOUTH TWO TIMES A DAY 11/11/20  Yes Historical Provider, MD   risperiDONE (RISPERDAL) 1 MG tablet  12/3/20  Yes Historical Provider, MD   meclizine (ANTIVERT) 25 MG tablet Take 25 mg by mouth 3 times daily as needed PRN   Yes Historical Provider, MD   Cholecalciferol (VITAMIN D3) 1.25 MG (17770 UT) CAPS Take by mouth once a week   Yes Historical Provider, MD   oxybutynin (DITROPAN-XL) 5 MG extended release tablet TAKE 1 TABLET BY MOUTH ONE TIME A DAY 6/28/19  Yes Historical Provider, MD   QUEtiapine Fumarate (SEROQUEL PO) Take 25 mg by mouth daily   Yes Historical Provider, MD   hydrOXYzine (VISTARIL) 50 MG capsule Take 25 mg by mouth nightly    Yes Historical Provider, MD        Allergies:  Acetaminophen-codeine, Darvocet [propoxyphene n-acetaminophen], Eggs or egg-derived products, Percocet [oxycodone-acetaminophen], Shrimp flavor, and Vicodin [hydrocodone-acetaminophen]     Review of Systems:   Lightheadedness/dizziness: No    Physical Examination:    Vitals:    06/22/21 1403   BP: 128/70   Pulse: 61   SpO2: 95%   Weight: 280 lb 8 oz (127.2 kg)   Height: 5' 7\" (1.702 m)        Constitutional and General Appearance: no apparent distress, obese  HEENT: non-icteric sclera, mask in place   Neck: JVP less than 8  cm H20  Respiratory:  · No use of accessory muscles  · Course wheezing posteriorly, no crackles, no rhonchi  Cardiovascular:  · Heart tones are distant, no murmur/rub/gallop  · Regular rate and rhythm, S1,S2 normal  · Radial pulses 2+ and equal bilaterally  · No edema  · Pedal Pulses: 2+ and equal   Abdomen:  · No masses or tenderness  · Liver: No Abnormalities Noted  Musculoskeletal/Skin:  · Exhibits normal gait balance and coordination  · There is no clubbing, cyanosis of the extremities  · Skin is warm and dry  · Moves all extremities well  Neurological/Psychiatric:  · Alert and oriented in all spheres  · No abnormalities of mood, affect, memory, mentation, or behavior are noted    Lab Data reviewed and analyzed   CBC:   Lab Results   Component Value Date    WBC 7.4 03/14/2021    WBC 9.5 11/09/2019    WBC 10.1 11/08/2019    RBC 4.54 03/14/2021    RBC 4.30 11/09/2019    RBC 4.37 11/08/2019    HGB 14.4 03/14/2021    HGB 14.2 11/09/2019    HGB 14.7 11/08/2019    HCT 43.4 03/14/2021    HCT 42.8 11/09/2019    HCT 43.6 11/08/2019    MCV 95.7 03/14/2021    MCV 99.5 11/09/2019    MCV 99.7 11/08/2019    RDW 14.4 03/14/2021    RDW 13.9 11/09/2019    RDW 14.0 11/08/2019     03/14/2021     11/09/2019     11/08/2019     Iron: No results found for: IRON, TIBC, FERRITIN  BMP:   Lab Results   Component Value Date     06/21/2021     05/07/2021  03/14/2021    K 4.2 06/21/2021    K 4.1 05/07/2021    K 5.1 03/14/2021     06/21/2021     05/07/2021     03/14/2021    CO2 28 06/21/2021    CO2 29 05/07/2021    CO2 33 03/14/2021    BUN 15 06/21/2021    BUN 17 05/07/2021    BUN 14 03/14/2021    CREATININE 1.0 06/21/2021    CREATININE 1.0 05/07/2021    CREATININE 1.0 03/14/2021     BNP:   Lab Results   Component Value Date    PROBNP 712 06/21/2021    PROBNP 2,576 05/07/2021    PROBNP 3,752 03/14/2021     Lipids: No results found for: CHOL   No results found for: TRIG   No results found for: HDL   No results found for: LDLCHOLESTEROL, LDLCALC   No results found for: LABVLDL, VLDL   No results found for: CHOLHDLRATIO    EF:   Lab Results   Component Value Date    LVEF 55 05/06/2021       Testing reviewed:  Echo: 5/6/2021   Summary   Technically difficult examination.   Normal systolic function with an estimated ejection fraction of 55%.   Mild concentric left ventricular hypertrophy.   Flattened in systole consistent with right ventricular pressure overload.   Normal left ventricular diastolic filling pressure.   The right ventricle appears mildly dilated with normal function.   The right atrium is mildly dilated.   MIld pulmonic and tricuspid regurgitation.   Systolic pulmonary artery pressure (SPAP) estimated at 54 mmHg (right atrial   pressure 3 mmHg), consistent with mild pulmonary hypertension. NYHA:   III  ACC/ AHA Stage:    c    Assessment:  · Chronic diastolic heart failure  · Heart failure with preserved ejection fraction  · COPD  · Obese  · Tobacco abuse  · Bipolar disordertakes Metformin for this  · Pulmonary nodule, being followed by pulmonary    Visit Diagnosis:    1. Chronic diastolic heart failure ([address]    2. Chronic obstructive pulmonary disease, unspecified COPD type ([address]    3. History of smoking    4.  Class 3 severe obesity due to excess calories with serious comorbidity and body mass index (BMI) of 40.0 to 44.9 in adult ([facility]      Plan:   1. Continue daily weights   2. Continue current regimen; recommend you increase your lasix to twice a day for a few days and then try to lay flat at home and see if your breathing improves with the extra water pill (to help prep you to lay flat for your CT scan)   3. If your weight goes up 3lbs in a day or 5lbs in a week, then take an extra 40mg lasix that day and recheck weight the next day. 4. Target about 2L fluid in a day; keep fluid intake consistent from day to day  5. Your labs look great and are improving, discussed electrolytes and proBNP. We will repeat labs prior to her next appointment  6. Target lower sodium diet- 2000mg of sodium a day  7. Follow up as planned       I appreciate the opportunity for caring for this patient.      WILLIAM Rogers - CNP, CNP, 6/22/2021, 3:14 PM

## 2021-06-22 NOTE — PROGRESS NOTES
[address]   Cardiac Follow-up    Primary Care Doctor:  Desmond Diaz, RN, NP    Chief Complaint   Patient presents with    Follow-up        History of Present Illness:   I had the pleasure of seeing Consuelo Sim in follow up for diastolic heart failure. History of COPD, asthma, bipolar, tobacco abuse, obese. Echo from 5/6/2021 showed normal EF 55%, mild LVH, RV pressure overload. No more significant orthopnea. Breathing is slowly improving. Edema is improved. Sleeping with 2 pills. Sleeps on her right side. She hasn't had chest ct due to inability to lay flat, although she denies feeling smothered. She has been taking the Lasix as prescribed. Her breathing did improve after the increased dose of Lasix last month. Consuelo Sim describes symptoms including dyspnea, fatigue, edema but denies chest pain, syncope. Appetite: good   Current diet: eats salad, eating out about 1 times a week. Fluid intake: drinks a lot of diet coke which has decreased from 2L to 1Liter a day; also drinks water about 15-30 ounces a day     Home weights: 276-278lbs    Past Medical History:   has a past medical history of Allergic rhinitis, Anxiety, Asthma, Bipolar disorder, mixed ([address] COPD (chronic obstructive pulmonary disease) ([address] COPD (chronic obstructive pulmonary disease) ([address] Depression, Eczema, Pulmonary nodule, and Tobacco abuse. Surgical History:   has a past surgical history that includes bronchoscopy and Tonsillectomy. Social History:   reports that she quit smoking about 5 years ago. Her smoking use included cigarettes. She has a 68.00 pack-year smoking history. She has never used smokeless tobacco. She reports that she does not drink alcohol and does not use drugs.    Family History:   Family History   Problem Relation Age of Onset    Diabetes Mother     Hypertension Father     Asthma Neg Hx     Cancer Neg Hx     Emphysema Neg Hx     Heart Failure Neg Hx        Home Medications:  Prior to Admission medications    Medication Sig Start Date End Date Taking?  Authorizing Provider   furosemide (LASIX) 40 MG tablet TAKE 1 TABLET BY MOUTH EVERY DAY  6/17/21  Yes Teresa Herman MD   fluticasone (FLONASE) 50 MCG/ACT nasal spray INHALE 2 SPRAYS IN EACH NOSTRIL ONE TIME A DAY  5/21/21  Yes Jaclyn Walsh MD   buPROPion (WELLBUTRIN XL) 300 MG extended release tablet  4/29/21  Yes Historical Provider, MD   tiotropium (SPIRIVA RESPIMAT) 2.5 MCG/ACT AERS inhaler INHALE 2 PUFFS BY MOUTH EVERY DAY 5/6/21  Yes Jaclyn Walsh MD   budesonide-formoterol (SYMBICORT) 160-4.5 MCG/ACT AERO INHALE 2 PUFFS BY MOUTH TWO TIMES A DAY 5/6/21  Yes Jaclyn Walsh MD   albuterol sulfate HFA (PROAIR HFA) 108 (90 Base) MCG/ACT inhaler Inhale 2 puffs into the lungs every 6 hours as needed for Wheezing or Shortness of Breath 5/6/21  Yes Jaclyn Walsh MD   albuterol (PROVENTIL) (2.5 MG/3ML) 0.083% nebulizer solution Take 3 mLs by nebulization every 6 hours as needed for Wheezing or Shortness of Breath DX COPD J44.9 5/6/21  Yes Jaclyn Walsh MD   loratadine (CLARITIN) 10 MG tablet Take 1 tablet by mouth daily 12/17/20  Yes Jaclyn Walsh MD   metFORMIN (GLUCOPHAGE) 850 MG tablet  12/3/20  Yes Historical Provider, MD   famotidine (PEPCID) 20 MG tablet TAKE 1 TABLET BY MOUTH TWO TIMES A DAY 11/11/20  Yes Historical Provider, MD   risperiDONE (RISPERDAL) 1 MG tablet  12/3/20  Yes Historical Provider, MD   meclizine (ANTIVERT) 25 MG tablet Take 25 mg by mouth 3 times daily as needed PRN   Yes Historical Provider, MD   Cholecalciferol (VITAMIN D3) 1.25 MG (17996 UT) CAPS Take by mouth once a week   Yes Historical Provider, MD   oxybutynin (DITROPAN-XL) 5 MG extended release tablet TAKE 1 TABLET BY MOUTH ONE TIME A DAY 6/28/19  Yes Historical Provider, MD   QUEtiapine Fumarate (SEROQUEL PO) Take 25 mg by mouth daily   Yes Historical Provider, MD   hydrOXYzine (VISTARIL) 50 MG capsule Take 25 mg by mouth nightly    Yes Historical Provider, MD        Allergies:  Acetaminophen-codeine, Darvocet [propoxyphene n-acetaminophen], Eggs or egg-derived products, Percocet [oxycodone-acetaminophen], Shrimp flavor, and Vicodin [hydrocodone-acetaminophen]     Review of Systems:   Lightheadedness/dizziness: No    Physical Examination:    Vitals:    06/22/21 1403   BP: 128/70   Pulse: 61   SpO2: 95%   Weight: 280 lb 8 oz (127.2 kg)   Height: 5' 7\" (1.702 m)        Constitutional and General Appearance: no apparent distress, obese  HEENT: non-icteric sclera, mask in place   Neck: JVP less than 8  cm H20  Respiratory:  · No use of accessory muscles  · Course wheezing posteriorly, no crackles, no rhonchi  Cardiovascular:  · Heart tones are distant, no murmur/rub/gallop  · Regular rate and rhythm, S1,S2 normal  · Radial pulses 2+ and equal bilaterally  · No edema  · Pedal Pulses: 2+ and equal   Abdomen:  · No masses or tenderness  · Liver: No Abnormalities Noted  Musculoskeletal/Skin:  · Exhibits normal gait balance and coordination  · There is no clubbing, cyanosis of the extremities  · Skin is warm and dry  · Moves all extremities well  Neurological/Psychiatric:  · Alert and oriented in all spheres  · No abnormalities of mood, affect, memory, mentation, or behavior are noted    Lab Data reviewed and analyzed   CBC:   Lab Results   Component Value Date    WBC 7.4 03/14/2021    WBC 9.5 11/09/2019    WBC 10.1 11/08/2019    RBC 4.54 03/14/2021    RBC 4.30 11/09/2019    RBC 4.37 11/08/2019    HGB 14.4 03/14/2021    HGB 14.2 11/09/2019    HGB 14.7 11/08/2019    HCT 43.4 03/14/2021    HCT 42.8 11/09/2019    HCT 43.6 11/08/2019    MCV 95.7 03/14/2021    MCV 99.5 11/09/2019    MCV 99.7 11/08/2019    RDW 14.4 03/14/2021    RDW 13.9 11/09/2019    RDW 14.0 11/08/2019     03/14/2021     11/09/2019     11/08/2019     Iron: No results found for: IRON, TIBC, FERRITIN  BMP:   Lab Results   Component Value Date     06/21/2021     05/07/2021  03/14/2021    K 4.2 06/21/2021    K 4.1 05/07/2021    K 5.1 03/14/2021     06/21/2021     05/07/2021     03/14/2021    CO2 28 06/21/2021    CO2 29 05/07/2021    CO2 33 03/14/2021    BUN 15 06/21/2021    BUN 17 05/07/2021    BUN 14 03/14/2021    CREATININE 1.0 06/21/2021    CREATININE 1.0 05/07/2021    CREATININE 1.0 03/14/2021     BNP:   Lab Results   Component Value Date    PROBNP 712 06/21/2021    PROBNP 2,576 05/07/2021    PROBNP 3,752 03/14/2021     Lipids: No results found for: CHOL   No results found for: TRIG   No results found for: HDL   No results found for: LDLCHOLESTEROL, LDLCALC   No results found for: LABVLDL, VLDL   No results found for: CHOLHDLRATIO    EF:   Lab Results   Component Value Date    LVEF 55 05/06/2021       Testing reviewed:  Echo: 5/6/2021   Summary   Technically difficult examination.   Normal systolic function with an estimated ejection fraction of 55%.   Mild concentric left ventricular hypertrophy.   Flattened in systole consistent with right ventricular pressure overload.   Normal left ventricular diastolic filling pressure.   The right ventricle appears mildly dilated with normal function.   The right atrium is mildly dilated.   MIld pulmonic and tricuspid regurgitation.   Systolic pulmonary artery pressure (SPAP) estimated at 54 mmHg (right atrial   pressure 3 mmHg), consistent with mild pulmonary hypertension. NYHA:   III  ACC/ AHA Stage:    c    Assessment:  · Chronic diastolic heart failure  · Heart failure with preserved ejection fraction  · COPD  · Obese  · Tobacco abuse  · Bipolar disorder-takes Metformin for this  · Pulmonary nodule, being followed by pulmonary    Visit Diagnosis:    1. Chronic diastolic heart failure ([address]    2. Chronic obstructive pulmonary disease, unspecified COPD type ([address]    3. History of smoking    4.  Class 3 severe obesity due to excess calories with serious comorbidity and body mass index (BMI) of 40.0 to 44.9 in adult ([facility]      Plan:   1. Continue daily weights   2. Continue current regimen; recommend you increase your lasix to twice a day for a few days and then try to lay flat at home and see if your breathing improves with the extra water pill (to help prep you to lay flat for your CT scan)   3. If your weight goes up 3lbs in a day or 5lbs in a week, then take an extra 40mg lasix that day and recheck weight the next day. 4. Target about 2L fluid in a day; keep fluid intake consistent from day to day  5. Your labs look great and are improving, discussed electrolytes and proBNP. We will repeat labs prior to her next appointment  6. Target lower sodium diet- 2000mg of sodium a day  7. Follow up as planned       I appreciate the opportunity for caring for this patient.      WILLIAM Morales - CNP, CNP, 6/22/2021, 3:14 PM

## 2021-06-23 ENCOUNTER — TELEPHONE (OUTPATIENT)
Dept: CARDIOLOGY CLINIC | Age: 56
End: 2021-06-23

## 2021-08-05 ENCOUNTER — TELEPHONE (OUTPATIENT)
Dept: PULMONOLOGY | Age: 56
End: 2021-08-05

## 2021-08-06 NOTE — TELEPHONE ENCOUNTER
N/Aon August 5  This medication does not require a Prior Authorization. There was a paid claim for this medication on 07/16/2021 and there was a paid claim for brand of this medication Symbicort on 7/16/2021.

## 2021-08-10 ENCOUNTER — TELEPHONE (OUTPATIENT)
Dept: PULMONOLOGY | Age: 56
End: 2021-08-10

## 2021-08-10 ENCOUNTER — VIRTUAL VISIT (OUTPATIENT)
Dept: PULMONOLOGY | Age: 56
End: 2021-08-10
Payer: COMMERCIAL

## 2021-08-10 DIAGNOSIS — J44.9 CHRONIC OBSTRUCTIVE PULMONARY DISEASE, UNSPECIFIED COPD TYPE (HCC): Primary | ICD-10-CM

## 2021-08-10 DIAGNOSIS — R93.89 ABNORMAL CXR: ICD-10-CM

## 2021-08-10 DIAGNOSIS — J45.40 MODERATE PERSISTENT ASTHMA WITHOUT COMPLICATION: ICD-10-CM

## 2021-08-10 PROCEDURE — 99442 PR PHYS/QHP TELEPHONE EVALUATION 11-20 MIN: CPT | Performed by: INTERNAL MEDICINE

## 2021-08-10 RX ORDER — TOPIRAMATE 25 MG/1
TABLET ORAL
COMMUNITY
Start: 2021-07-22 | End: 2021-08-10 | Stop reason: CLARIF

## 2021-08-10 RX ORDER — TOPIRAMATE 25 MG/1
25 TABLET ORAL 2 TIMES DAILY
COMMUNITY

## 2021-08-10 NOTE — TELEPHONE ENCOUNTER
Within this Telehealth Consent, the terms you and yours refer to the person using the Telehealth Service (Service), or in the case of a use of the Service by or on behalf of a minor, you and yours refer to and include (i) the parent or legal guardian who provides consent to the use of the Service by such minor or uses the Service on behalf of such minor, and (ii) the minor for whom consent is being provided or on whose behalf the Service is being utilized. When using Service, you will be consulting with your health care providers via the use of Telehealth.   Telehealth involves the delivery of healthcare services using electronic communications, information technology or other means between a healthcare provider and a patient who are not in the same physical location. Telehealth may be used for diagnosis, treatment, follow-up and/or patient education, and may include, but is not limited to, one or more of the following:    Electronic transmission of medical records, photo images, personal health information or other data between a patient and a healthcare provider    Interactions between a patient and healthcare provider via audio, video and/or data communications    Use of output data from medical devices, sound and video files    Anticipated Benefits   The use of Telehealth by your Provider(s) through the Service may have the following possible benefits:    Making it easier and more efficient for you to access medical care and treatment for the conditions treated by such Provider(s) utilizing the Service    Allowing you to obtain medical care and treatment by Provider(s) at times that are convenient for you    Enabling you to interact with Provider(s) without the necessity of an in-office appointment     Possible Risks   While the use of Telehealth can provide potential benefits for you, there are also potential risks associated with the use of Telehealth.  These risks include, but may not be limited to the following:    Your Provider(s) may not able to provide medical treatment for your particular condition and you may be required to seek alternative healthcare or emergency care services.  The electronic systems or other security protocols or safeguards used in the Service could fail, causing a breach of privacy of your medical or other information.  Given regulatory requirements in certain jurisdictions, your Provider(s) diagnosis and/or treatment options, especially pertaining to certain prescriptions, may be limited. Acceptance   1. You understand that Services will be provided via Telehealth. This process involves the use of HIPAA compliant and secure, real-time audio-visual interfacing with a qualified and appropriately trained provider located at [facility]. 2. You understand that, under no circumstances, will this session be recorded. 3. You understand that the Provider(s) at [facility] and other clinical participants will be party to the information obtained during the Telehealth session in accordance with best medical practices. 4. You understand that the information obtained during the Telehealth session will be used to help determine the most appropriate treatment options. 5. You understand that You have the right to revoke this consent at any point in time. 6. You understand that Telehealth is voluntary, and that continued treatment is not dependent upon consent. 7. You understand that, in the event of non-consent to Telehealth services and/or technical difficulties, you will obtain services as typically provided in the absence of Telehealth technology. 8. You understand that this consent will be kept in Your medical record. 9. No potential benefits from the use of Telehealth or specific results can be guaranteed. Your condition may not be cured or improved and, in some cases, may get worse.    10. There are limitations in the provision of medical care and treatment via Telehealth and the Service and you may not be able to receive diagnosis and/or treatment through the Service for every condition for which you seek diagnosis and/or treatment. 11. There are potential risks to the use of Telehealth, including but not limited to the risks described in this Telehealth Consent. 12. Your Provider(s) have discussed the use of Telehealth and the Service with you, including the benefits and risks of such and you have provided oral consent to your Provider(s) for the use of Telehealth and the Service. 15. You understand that it is your duty to provide your Provider(s) truthful, accurate and complete information, including all relevant information regarding care that you may have received or may be receiving from other healthcare providers outside of the Service. 14. You understand that each of your Provider(s) may determine in his or sole discretion that your condition is not suitable for diagnosis and/or treatment using the Service, and that you may need to seek medical care and treatment a specialist or other healthcare provider, outside of the Service. 15. You understand that you are fully responsible for payment for all services provided by Provider(s) or through use of the Service and that you may not be able to use third-party insurance. 16. You represent that (a) you have read this Telehealth Consent carefully, (b) you understand the risks and benefits of the Service and the use of Telehealth in the medical care and treatment provided to you by Provider(s) using the Service, and (c) you have the legal capacity and authority to provide this consent for yourself and/or the minor for which you are consenting under applicable federal and state laws, including laws relating to the age of [de-identified] and/or parental/guardian consent.    17. You give your informed consent to the use of Telehealth by Provider(s) using the Service under the terms described in the Terms of Service and this Telehealth Consent. The patient was read the following statement and has consented to the visit as of 8/10/21. The patient has been scheduled for their first telehealth visit on 8/10/21 with .

## 2021-08-10 NOTE — TELEPHONE ENCOUNTER
Pt to complete cxr within a week. Watch for result    LOV: 8/10/21       Assessment:       · Moderate obstructive airway disease secondary to COPD and Asthma   · Abnormal CXR with RENE nodule 3/14/21- not vitaliy to get CT   · Allergic rhinitis   · Pulmonary nodules. Remote granulomatous disease. Negative Bronch. Repeated CT chest 11/2009 showed parenchymal scarring within each lung which are unchanged or improved since 2007  · 66 pack year smoking - quit 11/2015  · Declined Xolair shots                 Plan:       · Continue Symbicort, Spiriva, and Albuterol INH/Neb PRN   · Continue Claritin   · CXR PA and lateral - not able to lay flat for CT.  Will consider Ativan if abnormal repeat CXR   · She refuses vaccines due to egg allergy   · Peak flow meter monitoring  · Advised to continue with smoking cessation   · Follow up in 3-6 months or sooner if needed

## 2021-08-10 NOTE — PROGRESS NOTES
[facility]                                                            Outpatient Follow Up Note  TELEHEALTH EVALUATION: Service performed was Audio (During [facility]- public health emergency) and not a face-to-face visit       CHIEF COMPLAINT: Follow up COPD       HPI:   Doing okay   Has not had her CT chest- not able to lay flat   Being evaluated for palpitation   Little cough with sputum in am   No hemoptysis   No smoking   Uses Albuterol 3-4 times/week        Past Medical History:   Diagnosis Date    Allergic rhinitis     Anxiety     Asthma     [facility]     [facility]chronic obstructive pulmonary disease) ([facility]     COPD (chronic obstructive pulmonary disease) ([facility] 5/7/2021    Depression     Eczema     Pulmonary nodule     Tobacco abuse        Past Surgical History:        Procedure Laterality Date    BRONCHOSCOPY      TONSILLECTOMY         Allergies:  is allergic to acetaminophen-codeine, darvocet [propoxyphene n-acetaminophen], eggs or egg-derived products, percocet [oxycodone-acetaminophen], shrimp flavor, and vicodin [hydrocodone-acetaminophen]. Social History:    TOBACCO:   reports that she quit smoking about 5 years ago. Her smoking use included cigarettes. She has a 68.00 pack-year smoking history. She has never used smokeless tobacco.  ETOH:   reports no history of alcohol use.       Family History:       Problem Relation Age of Onset    Diabetes Mother     Hypertension Father     Asthma Neg Hx     Cancer Neg Hx     Emphysema Neg Hx     Heart Failure Neg Hx        Current Medications:    Current Outpatient Medications:     furosemide (LASIX) 40 MG tablet, TAKE 1 TABLET BY MOUTH EVERY DAY , Disp: 90 tablet, Rfl: 1    fluticasone (FLONASE) 50 MCG/ACT nasal spray, INHALE 2 SPRAYS IN EACH NOSTRIL ONE TIME A DAY , Disp: 16 g, Rfl: 5    buPROPion (WELLBUTRIN XL) 300 MG extended release tablet, , Disp: , Rfl:     tiotropium (SPIRIVA RESPIMAT) 2.5 MCG/ACT AERS inhaler, INHALE 2 PUFFS BY MOUTH EVERY DAY, Disp: 4 g, Rfl: 5    budesonide-formoterol (SYMBICORT) 160-4.5 MCG/ACT AERO, INHALE 2 PUFFS BY MOUTH TWO TIMES A DAY, Disp: 1 Inhaler, Rfl: 5    albuterol sulfate HFA (PROAIR HFA) 108 (90 Base) MCG/ACT inhaler, Inhale 2 puffs into the lungs every 6 hours as needed for Wheezing or Shortness of Breath, Disp: 1 Inhaler, Rfl: 5    albuterol (PROVENTIL) (2.5 MG/3ML) 0.083% nebulizer solution, Take 3 mLs by nebulization every 6 hours as needed for Wheezing or Shortness of Breath DX COPD J44.9, Disp: 120 vial, Rfl: 5    loratadine (CLARITIN) 10 MG tablet, Take 1 tablet by mouth daily, Disp: 30 tablet, Rfl: 6    metFORMIN (GLUCOPHAGE) 850 MG tablet, , Disp: , Rfl:     famotidine (PEPCID) 20 MG tablet, TAKE 1 TABLET BY MOUTH TWO TIMES A DAY, Disp: , Rfl:     risperiDONE (RISPERDAL) 1 MG tablet, , Disp: , Rfl:     meclizine (ANTIVERT) 25 MG tablet, Take 25 mg by mouth 3 times daily as needed PRN, Disp: , Rfl:     Cholecalciferol (VITAMIN D3) 1.25 MG (03429 UT) CAPS, Take by mouth once a week, Disp: , Rfl:     oxybutynin (DITROPAN-XL) 5 MG extended release tablet, TAKE 1 TABLET BY MOUTH ONE TIME A DAY, Disp: , Rfl: 3    QUEtiapine Fumarate (SEROQUEL PO), Take 25 mg by mouth daily, Disp: , Rfl:     hydrOXYzine (VISTARIL) 50 MG capsule, Take 25 mg by mouth nightly , Disp: , Rfl:       Objective:     Telephone visit not able to obtain physical exam               DATA reviewed by me:   PFTs 08/08/2019 FEV1 1.58L(51%) Ratio 57% TLC 5.53L(95%)   DLCO 26.05 (102%) . PFTs 12/05/2017 FEV1 1.84L(59%) Ratio 57% TLC 7.06L(122%) DLCO 25.67 (98%)   6MW 1320 F LO2 92%. PFTs 08/14/2015 FEV1 1.80L(57%) Ratio 61% TLC 6.74L(116%) DLCO 30.47 (116%) 6MW 1200 F LO2 94%. PFTs 03/04/2013 FEV1 1.77L(60%)                  TLC 5.60L(97%)   DLCO 20.03 (93%)   6MW 1200 F LO2 94%.    PFTs 01/10/2012 FEV1 1.80L(60%)                   TLC 5.92L(106%) DLCO 20.74 (96%)     IgE of 157. Elevated IgE for egg white, shrimp, cat dander, dust mite ragweed, and dog dander. She got rid of her cats. CXR 11/8/2019   No acute cardiopulmonary disease     CXR 3/14/21  images reviewed by me and showed:   Cardiomegaly with pulmonary edema   RENE faint nodule       Assessment:       · Moderate obstructive airway disease secondary to COPD and Asthma   · Abnormal CXR with RENE nodule 3/14/21- not vitaliy to get CT   · Allergic rhinitis   · Pulmonary nodules. Remote granulomatous disease. Negative Bronch. Repeated CT chest 11/2009 showed parenchymal scarring within each lung which are unchanged or improved since 2007  · 66 pack year smoking - quit 11/2015  · Declined Xolair shots       Plan:       · Continue Symbicort, Spiriva, and Albuterol INH/Neb PRN   · Continue Claritin   · CXR PA and lateral - not able to lay flat for CT. · She refuses vaccines due to egg allergy   · Peak flow meter monitoring  · Advised to continue with smoking cessation   · Follow up in 3-6 months or sooner if needed              Julianna Euceda is a 50 y.o. female evaluated via telephone on 8/10/2021. Consent:  She and/or health care decision maker is aware that that she may receive a bill for this telephone service, depending on her insurance coverage, and has provided verbal consent to proceed: Yes       Documentation:  I communicated with the patient and/or health care decision maker about: See above   Details of this discussion including any medical advice provided: See above       I Affirm this is a Patient Initiated Episode with an Established Patient who has not had a related appointment within my department in the past 7 days or scheduled within the next 24 hours.     Total Time: 11-20 minutes     Note: not billable if this call serves to triage the patient into an appointment for the relevant concern      Kelton Ballesteros MD

## 2021-08-13 ENCOUNTER — HOSPITAL ENCOUNTER (OUTPATIENT)
Age: 56
Discharge: HOME | End: 2021-08-13
Payer: COMMERCIAL

## 2021-08-13 DIAGNOSIS — I50.32 CHRONIC DIASTOLIC HEART FAILURE (HCC): ICD-10-CM

## 2021-08-13 DIAGNOSIS — J44.9 CHRONIC OBSTRUCTIVE PULMONARY DISEASE, UNSPECIFIED COPD TYPE (HCC): ICD-10-CM

## 2021-08-13 LAB
ANION GAP SERPL CALCULATED.3IONS-SCNC: 11 MMOL/L (ref 3–16)
BUN BLDV-MCNC: 17 MG/DL (ref 7–20)
CALCIUM SERPL-MCNC: 9.7 MG/DL (ref 8.3–10.6)
CHLORIDE BLD-SCNC: 98 MMOL/L (ref 99–110)
CO2: 32 MMOL/L (ref 21–32)
CREAT SERPL-MCNC: 1.2 MG/DL (ref 0.6–1.1)
GFR AFRICAN AMERICAN: 57
GFR NON-AFRICAN AMERICAN: 47
GLUCOSE BLD-MCNC: 113 MG/DL (ref 70–99)
POTASSIUM SERPL-SCNC: 4.2 MMOL/L (ref 3.5–5.1)
PRO-BNP: 663 PG/ML (ref 0–124)
SODIUM BLD-SCNC: 141 MMOL/L (ref 136–145)
VITAMIN D 25-HYDROXY: 51.5 NG/ML

## 2021-08-13 PROCEDURE — 80048 BASIC METABOLIC PNL TOTAL CA: CPT

## 2021-08-13 PROCEDURE — 82306 VITAMIN D 25 HYDROXY: CPT

## 2021-08-13 PROCEDURE — 36415 COLL VENOUS BLD VENIPUNCTURE: CPT

## 2021-08-13 PROCEDURE — 83880 ASSAY OF NATRIURETIC PEPTIDE: CPT

## 2021-08-16 ENCOUNTER — OFFICE VISIT (OUTPATIENT)
Dept: CARDIOLOGY CLINIC | Age: 56
End: 2021-08-16
Payer: COMMERCIAL

## 2021-08-16 VITALS
OXYGEN SATURATION: 96 % | WEIGHT: 280 LBS | HEIGHT: 67 IN | SYSTOLIC BLOOD PRESSURE: 106 MMHG | HEART RATE: 59 BPM | DIASTOLIC BLOOD PRESSURE: 62 MMHG | BODY MASS INDEX: 43.95 KG/M2

## 2021-08-16 DIAGNOSIS — I50.31 ACUTE DIASTOLIC CONGESTIVE HEART FAILURE (HCC): Primary | ICD-10-CM

## 2021-08-16 DIAGNOSIS — Z72.0 TOBACCO ABUSE: ICD-10-CM

## 2021-08-16 DIAGNOSIS — R06.02 SHORTNESS OF BREATH: ICD-10-CM

## 2021-08-16 PROCEDURE — G8427 DOCREV CUR MEDS BY ELIG CLIN: HCPCS | Performed by: INTERNAL MEDICINE

## 2021-08-16 PROCEDURE — 1036F TOBACCO NON-USER: CPT | Performed by: INTERNAL MEDICINE

## 2021-08-16 PROCEDURE — 99214 OFFICE O/P EST MOD 30 MIN: CPT | Performed by: INTERNAL MEDICINE

## 2021-08-16 PROCEDURE — G8417 CALC BMI ABV UP PARAM F/U: HCPCS | Performed by: INTERNAL MEDICINE

## 2021-08-16 PROCEDURE — 3017F COLORECTAL CA SCREEN DOC REV: CPT | Performed by: INTERNAL MEDICINE

## 2021-08-16 NOTE — LETTER
[address] Cardiology - [address]  Phone: [phone]  Fax: [phone]    Anthony Cornejo MD    August 17, 2021     Jordi Heath RN, NP  [phone]    Patient: Josy Lynn   MR Number: <[id number]>   YOB: 1965   Date of Visit: 8/16/2021       Dear Jordi Heath: Thank you for referring Viviana Hurtado to me for evaluation/treatment. Below are the relevant portions of my assessment and plan of care. If you have questions, please do not hesitate to call me. I look forward to following Pamela along with you.     Sincerely,      Anthony Cornejo MD

## 2021-08-16 NOTE — PROGRESS NOTES
CARDIOLOGY FOLLOW UP        Patient Name: Александр Torres  Primary Care physician: Santiago Nguyen RN, NP    Reason for Referral/Chief Complaint: Александр Torres is a 50 y.o. patient who is referred to cardiology clinic today for evaluation and treatment of congestive heart failure with preserved ejection fraction. History of Present Illness:   Ms. Maddy Garvey is a pleasant 53-year-old woman with a prior history significant for asthma, chronic obstructive pulmonary disease, bipolar disease, tobacco abuse, obesity and congestive heart failure with preserved ejection fraction. The patient last had transthoracic echocardiogram 5/6/2021 revealing 55% EF, mild left ventricular hypertrophy, right ventricular pressure overload, mild right ventricular dilation with normal function, mild right atrial dilation. Her PASP was noted elevated at 54 mmHg (RA pressure of 3 mmHg). Today, she states she is doing reasonably well. Does note that she has occasional shortness of breath limiting her activity. She uses nebulizer treatments which relieve her dyspnea most times. She follows with pulmonary/Dr. Nat Murrell and has upcoming schedule chest x-ray and possibly chest CT. She states she misplaced her medications about 2 weeks ago. Reports her cat get a hold of her medication box and she could not find it. She was off her medications for a few days and noted increasing lower extremity edema including blistering of the skin and shortness of breath. When she found her medications and she was back on Lasix her edema did improve and has resolved to normal.  She has been taking all medications as prescribed at this time. She denies recent weight gain, orthopnea, paroxysmal nocturnal dyspnea. Patient currently denies any  palpitations, chest pain, dizziness, and syncope. She states she is not smoking, but lives with a roommate who does continue to smoke. She is taking nicotine lozenges to help her avoid cigarette use. MCG/ACT AERO INHALE 2 PUFFS BY MOUTH TWO TIMES A DAY 5/6/21  Yes Alfred Garland MD   albuterol sulfate HFA (PROAIR HFA) 108 (90 Base) MCG/ACT inhaler Inhale 2 puffs into the lungs every 6 hours as needed for Wheezing or Shortness of Breath 5/6/21  Yes Alfred Garland MD   albuterol (PROVENTIL) (2.5 MG/3ML) 0.083% nebulizer solution Take 3 mLs by nebulization every 6 hours as needed for Wheezing or Shortness of Breath DX COPD J44.9 5/6/21  Yes Alfred Garland MD   loratadine (CLARITIN) 10 MG tablet Take 1 tablet by mouth daily 12/17/20  Yes Alfred Garland MD   famotidine (PEPCID) 20 MG tablet TAKE 1 TABLET BY MOUTH TWO TIMES A DAY 11/11/20  Yes Historical Provider, MD   risperiDONE (RISPERDAL) 1 MG tablet  12/3/20  Yes Historical Provider, MD   meclizine (ANTIVERT) 25 MG tablet Take 25 mg by mouth 3 times daily as needed PRN   Yes Historical Provider, MD   Cholecalciferol (VITAMIN D3) 1.25 MG (86472 UT) CAPS Take by mouth once a week   Yes Historical Provider, MD   oxybutynin (DITROPAN-XL) 5 MG extended release tablet TAKE 1 TABLET BY MOUTH ONE TIME A DAY 6/28/19  Yes Historical Provider, MD   QUEtiapine Fumarate (SEROQUEL PO) Take 25 mg by mouth daily   Yes Historical Provider, MD   hydrOXYzine (VISTARIL) 50 MG capsule Take 25 mg by mouth nightly    Yes Historical Provider, MD   metFORMIN (GLUCOPHAGE) 850 MG tablet  12/3/20   Historical Provider, MD        CURRENT Medications:  No current facility-administered medications for this visit. Allergies:  Acetaminophen-codeine, Darvocet [propoxyphene n-acetaminophen], Eggs or egg-derived products, Percocet [oxycodone-acetaminophen], Shrimp flavor, and Vicodin [hydrocodone-acetaminophen]     Review of Systems:   A 14 point review of symptoms completed. Pertinent positives identified in the HPI, all other review of symptoms negative as below.       Objective:     Vitals:    08/16/21 1415   BP: 106/62   Pulse: 59   SpO2: 96%    Weight: 280 lb (127 kg)       PHYSICAL EXAM: General:  Alert, cooperative, no distress, appears stated age   Head:  Normocephalic, atraumatic   Eyes:  Conjunctiva/corneas clear, anicteric sclerae    Nose: Nares normal, no drainage or sinus tenderness   Throat: No abnormalities of the lips, oral mucosa or tongue. Neck: Trachea midline. Neck supple with no lymphadenopathy, thyroid not enlarged, symmetric, no tenderness/mass/nodules, no JVP   Lungs:   End Expiratory Wheezes right greater than left, no rales, no respiratory distress. Chest Wall:  No deformity or tenderness to palpation   Heart:  Regular rate and rhythm, normal S1, normal S2, 2/6 murmur over the left sternal border increased with inspiration consistent with tricuspid regurgitation, no rub, no S3/S4, PMI non-palpable   Abdomen:   Obese, soft, non-tender, with normoactive bowel sounds. No masses, no hepatosplenomegaly. Trace edema of lower abdomen wall   Extremities: No cyanosis, clubbing, extremely dry lower extremity, wrinkled skin, erythema, not warm. Vascular: 2+ radial, decreased dorsalis pedis and posterior tibial pulses bilaterally. Brisk carotid upstrokes without carotid bruit. Skin: Skin color, texture, turgor are normal with no rashes or ulceration. Dry skin. Pysch: Euthymic mood, appropriate affect   Neurologic: Oriented to person, place and time. No slurred speech or facial asymmetry. No motor or sensory deficits on gross examination.          Labs:   CBC:   Lab Results   Component Value Date    WBC 7.4 03/14/2021    RBC 4.54 03/14/2021    HGB 14.4 03/14/2021    HCT 43.4 03/14/2021    MCV 95.7 03/14/2021    RDW 14.4 03/14/2021     03/14/2021     CMP:  Lab Results   Component Value Date     08/13/2021    K 4.2 08/13/2021    CL 98 08/13/2021    CO2 32 08/13/2021    BUN 17 08/13/2021    CREATININE 1.2 08/13/2021    GFRAA 57 08/13/2021    GFRAA >60 09/10/2010    AGRATIO 1.3 03/14/2021    LABGLOM 47 08/13/2021    GLUCOSE 113 08/13/2021    PROT 6.4 03/14/2021    PROT 7.8 09/10/2010    CALCIUM 9.7 08/13/2021    BILITOT 0.4 03/14/2021    ALKPHOS 64 03/14/2021    AST 20 03/14/2021    ALT 13 03/14/2021     PT/INR:  No results found for: PTINR  HgBA1c:No results found for: LABA1C  Lab Results   Component Value Date    TROPONINI <0.01 11/09/2019     No results found for: CHOL  No results found for: TRIG  No results found for: HDL  No results found for: LDLCHOLESTEROL, LDLCALC  No results found for: LABVLDL, VLDL  No results found for: CHOLHDLRATIO     Pro BNP 08/13/21 663      Cardiac Data:     EKG: 3/14/21  Normal sinus rhythm Nonspecific ST abnormality 74 bpm    Echo: 5/6/2021   Summary   Technically difficult examination. Normal systolic function with an estimated ejection fraction of 55%. Mild concentric left ventricular hypertrophy. Flattened in systole consistent with right ventricular pressure overload. Normal left ventricular diastolic filling pressure. The right ventricle appears mildly dilated with normal function. The right atrium is mildly dilated. MIld pulmonic and tricuspid regurgitation. Systolic pulmonary artery pressure (SPAP) estimated at 54 mmHg (right atrial   pressure 3 mmHg), consistent with mild pulmonary hypertension. Chest x-ray 3/14/21  1. Cardiomegaly, with mild central venous congestion   2. Faint nodule left upper lobe, unchanged, likely postinflammatory in etiology. Impression and Plan:     1. Chronic congestive heart failure, with preserved ejection fraction  2. Shortness of breath, multifactorial and chronic, stable  3. Moderate pulmonary hypertension  4. Chronic obstructive pulmonary disease  5. Asthma   6. Prior smoker  7. Obesity with BMI 44    Patient Active Problem List   Diagnosis Code    Edema of both legs R60.0    Shortness of breath R06.02    Acute diastolic congestive heart failure (HCC) I50.31    COPD (chronic obstructive pulmonary disease) (HCC) J44.9    Tobacco abuse Z72.0     PLAN:  1.   Continue Lasix 40 mg daily without changes made today. 2.  No further cardiac testing at this time  3. She was asking about her labs today and I did inform her that her kidney function is mildly abnormal.  She has no uncontrolled diabetes that we know of for high blood pressure at this time. I did encourage her to discuss this further with her PCP if she has concerns. Do not think this warrants nephrology evaluation at this time. 4. Contact office if weight more than 2-3 pounds in a day or 5 pounds in a week, any worsening swelling to legs, and worsening shortness of breath. Follow up in 6 months with me. Juli's attestation: This note was scribed in the presence of Jasmin Shi by Milad Lynn RN    The scribes documentation has been prepared under my direction and personally reviewed by me in its entirety. I confirm that the note above accurately reflects all work, treatment, procedures, and medical decision making performed by me. Vannessa Dean MD, personally performed the services described in this documentation as scribed by Milad Lynn RN in my presence, and it is both accurate and complete to the best of our ability. I will address the patient's cardiac risk factors and adjusted pharmacologic treatment as needed. In addition, I have reinforced the need for patient directed risk factor modification. All questions and concerns were addressed to the patient/family. Alternatives to my treatment were discussed. Thank you for allowing us to participate in the care of Marck Melton. Please call me with any questions [phone].     Gisell Lomas MD, [facility] [place]  Cardiovascular Disease  [place] [address]  [phone] [facility]  [phone] [address] [place] [address]  8/16/2021 2:46 PM

## 2021-08-17 ENCOUNTER — TELEPHONE (OUTPATIENT)
Dept: CARDIOLOGY CLINIC | Age: 56
End: 2021-08-17

## 2021-08-17 NOTE — TELEPHONE ENCOUNTER
----- Message from WILLIAM Maharaj CNP sent at 8/17/2021  3:16 PM EDT -----  Heart failure number is  much improved. Mild decrease in the kidney function. Let's repeat BMP and BNP in 1 month.   Blood sugar was mildly up, please follow up with PCP regarding sugars

## 2021-08-17 NOTE — RESULT ENCOUNTER NOTE
Heart failure number is  much improved. Mild decrease in the kidney function. Let's repeat BMP and BNP in 1 month.   Blood sugar was mildly up, please follow up with PCP regarding sugars

## 2021-11-24 RX ORDER — ALBUTEROL SULFATE 90 UG/1
AEROSOL, METERED RESPIRATORY (INHALATION)
Qty: 8.5 G | Refills: 0 | Status: SHIPPED | OUTPATIENT
Start: 2021-11-24 | End: 2021-12-27

## 2021-11-24 RX ORDER — ALBUTEROL SULFATE 2.5 MG/3ML
SOLUTION RESPIRATORY (INHALATION)
Qty: 360 ML | Refills: 0 | Status: SHIPPED | OUTPATIENT
Start: 2021-11-24 | End: 2021-12-27

## 2021-11-24 RX ORDER — FLUTICASONE PROPIONATE 50 MCG
SPRAY, SUSPENSION (ML) NASAL
Qty: 16 G | Refills: 0 | Status: SHIPPED | OUTPATIENT
Start: 2021-11-24 | End: 2021-12-27

## 2021-11-24 RX ORDER — BUDESONIDE AND FORMOTEROL FUMARATE DIHYDRATE 160; 4.5 UG/1; UG/1
AEROSOL RESPIRATORY (INHALATION)
Qty: 10.2 G | Refills: 0 | Status: SHIPPED | OUTPATIENT
Start: 2021-11-24 | End: 2021-12-27

## 2021-12-24 DIAGNOSIS — J44.9 CHRONIC OBSTRUCTIVE PULMONARY DISEASE, UNSPECIFIED COPD TYPE (HCC): Primary | ICD-10-CM

## 2021-12-27 RX ORDER — ALBUTEROL SULFATE 2.5 MG/3ML
SOLUTION RESPIRATORY (INHALATION)
Qty: 360 ML | Refills: 3 | Status: SHIPPED | OUTPATIENT
Start: 2021-12-27 | End: 2022-04-29 | Stop reason: SDUPTHER

## 2021-12-27 RX ORDER — BUDESONIDE AND FORMOTEROL FUMARATE DIHYDRATE 160; 4.5 UG/1; UG/1
AEROSOL RESPIRATORY (INHALATION)
Qty: 10.2 G | Refills: 3 | Status: SHIPPED | OUTPATIENT
Start: 2021-12-27 | End: 2022-04-27

## 2021-12-27 RX ORDER — ALBUTEROL SULFATE 90 UG/1
AEROSOL, METERED RESPIRATORY (INHALATION)
Qty: 8.5 G | Refills: 3 | Status: SHIPPED | OUTPATIENT
Start: 2021-12-27 | End: 2022-02-28

## 2021-12-27 RX ORDER — FLUTICASONE PROPIONATE 50 MCG
SPRAY, SUSPENSION (ML) NASAL
Qty: 16 G | Refills: 3 | Status: SHIPPED | OUTPATIENT
Start: 2021-12-27

## 2022-02-10 ENCOUNTER — VIRTUAL VISIT (OUTPATIENT)
Dept: PULMONOLOGY | Age: 57
End: 2022-02-10
Payer: COMMERCIAL

## 2022-02-10 DIAGNOSIS — Z87.891 HISTORY OF TOBACCO USE: ICD-10-CM

## 2022-02-10 DIAGNOSIS — J30.9 ALLERGIC RHINITIS, UNSPECIFIED SEASONALITY, UNSPECIFIED TRIGGER: ICD-10-CM

## 2022-02-10 DIAGNOSIS — J44.9 CHRONIC OBSTRUCTIVE PULMONARY DISEASE, UNSPECIFIED COPD TYPE (HCC): Primary | ICD-10-CM

## 2022-02-10 DIAGNOSIS — R91.8 PULMONARY NODULES: ICD-10-CM

## 2022-02-10 DIAGNOSIS — R93.89 ABNORMAL CXR: ICD-10-CM

## 2022-02-10 DIAGNOSIS — J45.40 MODERATE PERSISTENT ASTHMA WITHOUT COMPLICATION: ICD-10-CM

## 2022-02-10 PROCEDURE — 99442 PR PHYS/QHP TELEPHONE EVALUATION 11-20 MIN: CPT | Performed by: INTERNAL MEDICINE

## 2022-02-10 NOTE — PROGRESS NOTES
[facility]                                                            Outpatient Follow Up Note  TELEHEALTH EVALUATION: Service performed was Audio (During [facility] public health emergency) and not a face-to-face visit       CHIEF COMPLAINT: Follow up COPD       HPI:   Doing okay   Little wheezes   Some cough with clear sputum  No hemoptysis   No smoking   Uses Albuterol 3 times/week  Has not had her CXR - has been busy       Past Medical History:   Diagnosis Date    Allergic rhinitis     Anxiety     Asthma     [facility]     [facility]     [facility] obstructive pulmonary disease) ([facility] 5/7/2021    Depression     Eczema     Pulmonary nodule     Tobacco abuse        Past Surgical History:        Procedure Laterality Date    BRONCHOSCOPY      TONSILLECTOMY         Allergies:  is allergic to acetaminophen-codeine, darvocet [propoxyphene n-acetaminophen], eggs or egg-derived products, percocet [oxycodone-acetaminophen], shrimp flavor, and vicodin [hydrocodone-acetaminophen]. Social History:    TOBACCO:   reports that she quit smoking about 5 years ago. Her smoking use included cigarettes. She has a 68.00 pack-year smoking history. She has never used smokeless tobacco.  ETOH:   reports no history of alcohol use.       Family History:       Problem Relation Age of Onset    Diabetes Mother     Hypertension Father     Asthma Neg Hx     Cancer Neg Hx     Emphysema Neg Hx     Heart Failure Neg Hx        Current Medications:    Current Outpatient Medications:     fluticasone (FLONASE) 50 MCG/ACT nasal spray, INHALE 2 SPRAYS IN EACH NOSTRIL ONE TIME A DAY, Disp: 16 g, Rfl: 3    albuterol sulfate  (90 Base) MCG/ACT inhaler, INHALE 2 PUFFS BY MOUTH EVERY SIX HOURS AS NEEDED FOR WHEEZING OR SHORTNESS OF BREATH, Disp: 8.5 g, Rfl: 3    budesonide-formoterol (SYMBICORT) 160-4.5 MCG/ACT AERO, INHALE 2 PUFFS BY MOUTH TWO TIMES A DAY, Disp: 10.2 g, Rfl: 3    albuterol (PROVENTIL) (2.5 MG/3ML) 0.083% nebulizer solution, inhale contents of 1 vial (3ml) via nebulizer every 6 hours as needed for wheezing or shortness of breath, Disp: 360 mL, Rfl: 3    topiramate (TOPAMAX) 25 MG tablet, Take 25 mg by mouth 2 times daily, Disp: , Rfl:     furosemide (LASIX) 40 MG tablet, TAKE 1 TABLET BY MOUTH EVERY DAY , Disp: 90 tablet, Rfl: 1    buPROPion (WELLBUTRIN XL) 300 MG extended release tablet, , Disp: , Rfl:     tiotropium (SPIRIVA RESPIMAT) 2.5 MCG/ACT AERS inhaler, INHALE 2 PUFFS BY MOUTH EVERY DAY, Disp: 4 g, Rfl: 5    loratadine (CLARITIN) 10 MG tablet, Take 1 tablet by mouth daily, Disp: 30 tablet, Rfl: 6    metFORMIN (GLUCOPHAGE) 850 MG tablet, , Disp: , Rfl:     famotidine (PEPCID) 20 MG tablet, TAKE 1 TABLET BY MOUTH TWO TIMES A DAY, Disp: , Rfl:     risperiDONE (RISPERDAL) 1 MG tablet, , Disp: , Rfl:     meclizine (ANTIVERT) 25 MG tablet, Take 25 mg by mouth 3 times daily as needed PRN, Disp: , Rfl:     Cholecalciferol (VITAMIN D3) 1.25 MG (13712 UT) CAPS, Take by mouth once a week, Disp: , Rfl:     oxybutynin (DITROPAN-XL) 5 MG extended release tablet, TAKE 1 TABLET BY MOUTH ONE TIME A DAY, Disp: , Rfl: 3    QUEtiapine Fumarate (SEROQUEL PO), Take 25 mg by mouth daily, Disp: , Rfl:     hydrOXYzine (VISTARIL) 50 MG capsule, Take 25 mg by mouth nightly , Disp: , Rfl:       Objective:     Telephone visit not able to obtain physical exam               DATA reviewed by me:   PFTs 08/08/2019 FEV1 1.58L(51%) Ratio 57% TLC 5.53L(95%)   DLCO 26.05 (102%) . PFTs 12/05/2017 FEV1 1.84L(59%) Ratio 57% TLC 7.06L(122%) DLCO 25.67 (98%)   6MW 1320 F LO2 92%. PFTs 08/14/2015 FEV1 1.80L(57%) Ratio 61% TLC 6.74L(116%) DLCO 30.47 (116%) 6MW 1200 F LO2 94%. PFTs 03/04/2013 FEV1 1.77L(60%)                  TLC 5.60L(97%)   DLCO 20.03 (93%)   6MW 1200 F LO2 94%.    PFTs 01/10/2012 FEV1 1.80L(60%)                   TLC 5.92L(106%) DLCO 20.74 (96%)     IgE of 157. Elevated IgE for egg white, shrimp, cat dander, dust mite ragweed, and dog dander. She got rid of her cats. CXR 11/8/2019   No acute cardiopulmonary disease     CXR 3/14/21  images reviewed by me and showed:   Cardiomegaly with pulmonary edema   RENE faint nodule       Assessment:       · Moderate obstructive airway disease secondary to COPD and Asthma   · Abnormal CXR with RENE nodule 3/14/21- not vitaliy to get CT   · Allergic rhinitis   · Pulmonary nodules. Remote granulomatous disease. Negative Bronch. Repeated CT chest 11/2009 showed parenchymal scarring within each lung which are unchanged or improved since 2007  · 66 pack year smoking - quit 11/2015  · Declined Xolair shots       Plan:       · Continue Symbicort, Spiriva, and Albuterol INH/Neb PRN   · Medications refills today   · Advised to get her CXR PA and lateral - not able to lay flat for CT. · She refuses vaccines due to egg allergy. Risks, benefits and side effects were discussed with patient  · Peak flow meter monitoring  · Advised to continue with smoking cessation   · Follow up in 3-6 months or sooner if needed                Nadeem Urbano is a 49 y.o. female evaluated via telephone on 2/10/2022. Consent:  She and/or health care decision maker is aware that that she may receive a bill for this telephone service, depending on her insurance coverage, and has provided verbal consent to proceed: Yes       Documentation:  I communicated with the patient and/or health care decision maker about: See above   Details of this discussion including any medical advice provided: See above       I Affirm this is a Patient Initiated Episode with an Established Patient who has not had a related appointment within my department in the past 7 days or scheduled within the next 24 hours.     Total Time: 11-20 minutes     Note: not billable if this call serves to triage the patient into an appointment for the relevant concern      Benja Bateman MD

## 2022-03-26 DIAGNOSIS — I50.31 ACUTE DIASTOLIC CONGESTIVE HEART FAILURE (HCC): Primary | ICD-10-CM

## 2022-03-26 DIAGNOSIS — R06.02 SHORTNESS OF BREATH: ICD-10-CM

## 2022-03-31 RX ORDER — FUROSEMIDE 40 MG/1
40 TABLET ORAL DAILY
Qty: 45 TABLET | Refills: 0 | Status: SHIPPED | OUTPATIENT
Start: 2022-03-31 | End: 2022-05-15

## 2022-04-04 NOTE — TELEPHONE ENCOUNTER
04/04/2022- Pt stated she is getting her lasix filled by her PCP. Pt is curious if RJM still needs to see her. Please contact and advise.

## 2022-04-05 NOTE — TELEPHONE ENCOUNTER
I think seeing her twice yearly is appropriate for her history of congestive heart failure. Up to her - but happy to see and manage. If there is more convenient location/provider however, we can explore that for her. She may get PCP thoughts on follow up with us also - they may appreciate our continued care.  ROMINA LLOYD

## 2022-04-05 NOTE — TELEPHONE ENCOUNTER
Pt called back and scheduled her routine followup with DEJAM for 5/16/22 at 3p (this was first available at time of call)

## 2022-04-27 RX ORDER — BUDESONIDE AND FORMOTEROL FUMARATE DIHYDRATE 160; 4.5 UG/1; UG/1
AEROSOL RESPIRATORY (INHALATION)
Qty: 10.2 G | Refills: 5 | Status: SHIPPED | OUTPATIENT
Start: 2022-04-27 | End: 2022-10-20

## 2022-04-29 DIAGNOSIS — J44.9 CHRONIC OBSTRUCTIVE PULMONARY DISEASE, UNSPECIFIED COPD TYPE (HCC): ICD-10-CM

## 2022-04-29 RX ORDER — ALBUTEROL SULFATE 2.5 MG/3ML
SOLUTION RESPIRATORY (INHALATION)
Qty: 360 ML | Refills: 5 | Status: SHIPPED | OUTPATIENT
Start: 2022-04-29 | End: 2022-10-26

## 2022-05-13 NOTE — PROGRESS NOTES
CARDIOLOGY FOLLOW UP        Patient Name: Armin Szymanski  Primary Care physician: Estela Silva RN, NP    Reason for Referral/Chief Complaint: Armin Szymanski is a 49 y.o. patient who is referred to cardiology clinic today for evaluation and treatment of congestive heart failure with preserved ejection fraction. History of Present Illness:   Ms. Bee Resendez is a pleasant 24-year-old woman with a prior history significant for asthma, chronic obstructive pulmonary disease, bipolar disease, tobacco abuse, obesity and congestive heart failure with preserved ejection fraction. The patient last had transthoracic echocardiogram 5/6/2021 revealing 55% EF, mild left ventricular hypertrophy, right ventricular pressure overload, mild right ventricular dilation with normal function, mild right atrial dilation. Her PASP was noted elevated at 54 mmHg (RA pressure of 3 mmHg). LOV 08/16/2021, She was off her medications for a few days and noted increasing lower extremity edema including blistering of the skin and shortness of breath. When she found her medications and she was back on Lasix her edema did improve and has resolved to normal.  Plan was to continue Lasix 40 mg daily without changes made today. Contact office if weight more than 2-3 pounds in a day or 5 pounds in a week. Weight currently 280 pounds. In the interim she was started on Spiriva by pulmonary. Today, presents in wheelchair. She states that she has been keeping legs elevated to help with swelling at times which usually resolves edema at the end of the day. She is compliant with 40 mg oral Lasix. Using inhalers with COPD. Reports spiriva helping her shortness of breath. Does state she has gained some weight, 293 from 280 at last evaluation in August. No worsening edema. Chronic orthopnea. No PND. She does snore. She does fall asleep during daytime easily such as when using her nebulizer.      Admits with stairs she will feel her heart beat faster. Denies syncope. Patient has low mood today. Taking her bupropion, says she does have bipolar depression, so she does go up and down with moods. he patient endorses highest level of activity as laundry in basement/house chores. She admits is back to smoking half a pack a day. The patient is compliant with medications. Cost of medications is affordable. No endorsed side effects. Home Medications:  Were reviewed and are listed in nursing record and/or below  Prior to Admission medications    Medication Sig Start Date End Date Taking?  Authorizing Provider   albuterol (PROVENTIL) (2.5 MG/3ML) 0.083% nebulizer solution inhale contents of 1 vial (3ml) via nebulizer every 6 hours as needed for wheezing or shortness of breath 4/29/22  Yes Delgado Yu PA-C   budesonide-formoterol (SYMBICORT) 160-4.5 MCG/ACT AERO INHALE 2 PUFFS BY MOUTH TWO TIMES A DAY 4/27/22  Yes Nell Quigley MD   VENTOLIN  (90 Base) MCG/ACT inhaler INHALE 2 PUFFS BY MOUTH EVERY SIX HOURS AS NEEDED FOR WHEEZING OR SHORTNESS OF BREATH 2/28/22  Yes Nell Quigley MD   tiotropium (SPIRIVA RESPIMAT) 2.5 MCG/ACT AERS inhaler INHALE 2 PUFFS BY MOUTH EVERY DAY 2/10/22  Yes Nell Quigley MD   fluticasone (FLONASE) 50 MCG/ACT nasal spray INHALE 2 SPRAYS IN EACH NOSTRIL ONE TIME A DAY 12/27/21  Yes Nell Quigley MD   topiramate (TOPAMAX) 25 MG tablet Take 25 mg by mouth 2 times daily   Yes Historical Provider, MD   buPROPion (WELLBUTRIN XL) 300 MG extended release tablet  4/29/21  Yes Historical Provider, MD   loratadine (CLARITIN) 10 MG tablet Take 1 tablet by mouth daily 12/17/20  Yes Nell Quigley MD   famotidine (PEPCID) 20 MG tablet TAKE 1 TABLET BY MOUTH TWO TIMES A DAY 11/11/20  Yes Historical Provider, MD   meclizine (ANTIVERT) 25 MG tablet Take 25 mg by mouth 3 times daily as needed PRN   Yes Historical Provider, MD   Cholecalciferol (VITAMIN D3) 1.25 MG (46401 UT) CAPS Take by mouth once a week   Yes Historical Provider, MD   oxybutynin (DITROPAN-XL) 5 MG extended release tablet TAKE 1 TABLET BY MOUTH ONE TIME A DAY 6/28/19  Yes Historical Provider, MD   hydrOXYzine (VISTARIL) 50 MG capsule Take 25 mg by mouth nightly    Yes Historical Provider, MD   furosemide (LASIX) 40 MG tablet Take 1 tablet by mouth daily 3/31/22 5/15/22  [place], MD   metFORMIN (GLUCOPHAGE) 850 MG tablet  12/3/20   Historical Provider, MD   risperiDONE (RISPERDAL) 1 MG tablet  12/3/20   Historical Provider, MD   QUEtiapine Fumarate (SEROQUEL PO) Take 25 mg by mouth daily  Patient not taking: Reported on 2/10/2022    Historical Provider, MD        CURRENT Medications:  No current facility-administered medications for this visit. Allergies:  Acetaminophen-codeine, Darvocet [propoxyphene n-acetaminophen], Eggs or egg-derived products, Percocet [oxycodone-acetaminophen], Shrimp flavor, and Vicodin [hydrocodone-acetaminophen]     Review of Systems:   A 14 point review of symptoms completed. Pertinent positives identified in the HPI, all other review of symptoms negative as below. Objective:     Vitals:    05/16/22 1509   BP: 125/60   Pulse: 62   SpO2: 97%   Weight: 293 lb (132.9 kg)   Height: 5' 7\" (1.702 m)     Wt Readings from Last 3 Encounters:   05/16/22 293 lb (132.9 kg)   08/16/21 280 lb (127 kg)   06/22/21 280 lb 8 oz (127.2 kg)          PHYSICAL EXAM:    General:  Alert, cooperative, no distress, appears stated age   Head:  Normocephalic, atraumatic   Eyes:  Conjunctiva/corneas clear, anicteric sclerae    Nose: Nares normal, no drainage or sinus tenderness   Throat: No abnormalities of the lips, oral mucosa or tongue. Neck: Trachea midline. Neck supple with no lymphadenopathy, thyroid not enlarged, symmetric, no tenderness/mass/nodules   Lungs:   End Expiratory Wheezes bilaterally , no rales, no respiratory distress.    Chest Wall:  No deformity or tenderness to palpation   Heart:  Regular rate and rhythm, normal S1, normal S2, 2/6 murmur over the left sternal border increased with inspiration consistent with tricuspid regurgitation, no rub, no S3/S4, PMI non-palpable   Abdomen:   Obese, soft, non-tender, with normoactive bowel sounds. No masses, no hepatosplenomegaly. Trace edema of lower abdomen wall   Extremities: No cyanosis, clubbing, no pitting edema   Vascular: 2+ radial, decreased dorsalis pedis and posterior tibial pulses bilaterally. Brisk carotid upstrokes without carotid bruit. Skin: Healing sores anterior shins bilat. No erythema/tenderness   Pysch: Euthymic mood, appropriate affect   Neurologic: Oriented to person, place and time. No slurred speech or facial asymmetry. No motor or sensory deficits on gross examination. Labs:   CBC:   Lab Results   Component Value Date    WBC 7.4 03/14/2021    RBC 4.54 03/14/2021    HGB 14.4 03/14/2021    HCT 43.4 03/14/2021    MCV 95.7 03/14/2021    RDW 14.4 03/14/2021     03/14/2021     CMP:  Lab Results   Component Value Date     08/13/2021    K 4.2 08/13/2021    CL 98 08/13/2021    CO2 32 08/13/2021    BUN 17 08/13/2021    CREATININE 1.2 08/13/2021    GFRAA 57 08/13/2021    GFRAA >60 09/10/2010    AGRATIO 1.3 03/14/2021    LABGLOM 47 08/13/2021    GLUCOSE 113 08/13/2021    PROT 6.4 03/14/2021    PROT 7.8 09/10/2010    CALCIUM 9.7 08/13/2021    BILITOT 0.4 03/14/2021    ALKPHOS 64 03/14/2021    AST 20 03/14/2021    ALT 13 03/14/2021     PT/INR:  No results found for: PTINR  HgBA1c:No results found for: LABA1C  Lab Results   Component Value Date    TROPONINI <0.01 11/09/2019     No results found for: CHOL  No results found for: TRIG  No results found for: HDL  No results found for: LDLCHOLESTEROL, LDLCALC  No results found for: LABVLDL, VLDL  No results found for: CHOLHDLRATIO     Pro BNP 08/13/21 663      Cardiac Data:     EKG: 3/14/21  Normal sinus rhythm Nonspecific ST abnormality 74 bpm    Echo: 5/6/2021   Summary   Technically difficult examination.    Normal systolic function with an estimated ejection fraction of 55%. Mild concentric left ventricular hypertrophy. Flattened in systole consistent with right ventricular pressure overload. Normal left ventricular diastolic filling pressure. The right ventricle appears mildly dilated with normal function. The right atrium is mildly dilated. MIld pulmonic and tricuspid regurgitation. Systolic pulmonary artery pressure (SPAP) estimated at 54 mmHg (right atrial   pressure 3 mmHg), consistent with mild pulmonary hypertension. Chest x-ray 3/14/21  1. Cardiomegaly, with mild central venous congestion   2. Faint nodule left upper lobe, unchanged, likely postinflammatory in etiology. Impression and Plan:     1. Chronic congestive heart failure, with preserved ejection fraction  2. Shortness of breath, multifactorial and chronic, stable  3. Moderate pulmonary hypertension  4. Chronic obstructive pulmonary disease  5. Asthma   6. Prior smoker  7. Obesity with BMI 46  8. Suspect sleep apnea     Patient Active Problem List   Diagnosis Code    Edema of both legs R60.0    Shortness of breath R06.02    Acute diastolic congestive heart failure ([facility]) I50.31    COPD (chronic obstructive pulmonary disease) ([facility]) J44.9    Tobacco abuse Z72.0     PLAN:  1. Referral for sleep study with Dr. Jerome Anguiano. 2.  Continue Lasix 40 mg once daily  3. Check BMP/Pro-BNP/CBC  4. Daily weights at home and call if gain over 5 lbs in 1 week or have increased swelling  5.  2 liters fluid intake or less daily; needs to cut down diet cokes  6. Low salt diet discussed    Follow up in  6 months     This note is scribed in the presence of Tanesha Holden by Jones Elena RN    The scribes documentation has been prepared under my direction and personally reviewed by me in its entirety. I confirm that the note above accurately reflects all work, treatment, procedures, and medical decision making performed by me.   Magda Ordonez MD, personally performed the services described in this documentation as scribed by Mariely Andrade RN in my presence, and it is both accurate and complete to the best of our ability. I will address the patient's cardiac risk factors and adjusted pharmacologic treatment as needed. In addition, I have reinforced the need for patient directed risk factor modification. All questions and concerns were addressed to the patient/family. Alternatives to my treatment were discussed. Thank you for allowing us to participate in the care of Alpha Sa. Please call me with any questions [phone].     Sangeeta Palacio MD, [facility]  Cardiovascular Disease  [address]  [phone] [facility]  [phone] [address] [place] [address]  5/16/2022 3:29 PM

## 2022-05-16 ENCOUNTER — OFFICE VISIT (OUTPATIENT)
Dept: CARDIOLOGY CLINIC | Age: 57
End: 2022-05-16
Payer: COMMERCIAL

## 2022-05-16 VITALS
HEART RATE: 62 BPM | OXYGEN SATURATION: 97 % | SYSTOLIC BLOOD PRESSURE: 125 MMHG | BODY MASS INDEX: 45.99 KG/M2 | DIASTOLIC BLOOD PRESSURE: 60 MMHG | WEIGHT: 293 LBS | HEIGHT: 67 IN

## 2022-05-16 DIAGNOSIS — R06.83 SNORING: Primary | ICD-10-CM

## 2022-05-16 DIAGNOSIS — I50.31 ACUTE DIASTOLIC CONGESTIVE HEART FAILURE (HCC): ICD-10-CM

## 2022-05-16 PROCEDURE — 93000 ELECTROCARDIOGRAM COMPLETE: CPT | Performed by: INTERNAL MEDICINE

## 2022-05-16 PROCEDURE — 99214 OFFICE O/P EST MOD 30 MIN: CPT | Performed by: INTERNAL MEDICINE

## 2022-05-16 PROCEDURE — G8427 DOCREV CUR MEDS BY ELIG CLIN: HCPCS | Performed by: INTERNAL MEDICINE

## 2022-05-16 PROCEDURE — G8417 CALC BMI ABV UP PARAM F/U: HCPCS | Performed by: INTERNAL MEDICINE

## 2022-05-16 PROCEDURE — 3017F COLORECTAL CA SCREEN DOC REV: CPT | Performed by: INTERNAL MEDICINE

## 2022-05-16 PROCEDURE — 1036F TOBACCO NON-USER: CPT | Performed by: INTERNAL MEDICINE

## 2022-05-16 NOTE — LETTER
[address] Cardiology - [address]  Phone: [phone]  Fax: [phone]    Josephine Cooper MD    May 17, 2022     Bk Carrillo RN, NP  [address]    Patient: Pritesh Sharma   MR Number: [id number]   YOB: 1965   Date of Visit: 5/16/2022       Dear Bk Carrillo: Thank you for referring Suzette Agarwal to me for evaluation/treatment. Below are the relevant portions of my assessment and plan of care. If you have questions, please do not hesitate to call me. I look forward to following Pamela along with you.     Sincerely,      Josephine Cooper MD

## 2022-05-16 NOTE — PATIENT INSTRUCTIONS
PLAN:  1. Referral for sleep study with Dr. Nakul Whiteside. 2.  Lasix 40 mg once daily  3. BMP/Pro-BNP/CBC  4.  Daily weights at home and call if gain over 5 lbs or have increased swelling  5. 2 liters or less a day of fluid intake    Follow up in  6 months

## 2022-06-26 DIAGNOSIS — J45.40 MODERATE PERSISTENT ASTHMA WITHOUT COMPLICATION: ICD-10-CM

## 2022-06-26 DIAGNOSIS — J44.9 CHRONIC OBSTRUCTIVE PULMONARY DISEASE, UNSPECIFIED COPD TYPE (HCC): ICD-10-CM

## 2022-08-23 ENCOUNTER — TELEPHONE (OUTPATIENT)
Dept: PULMONOLOGY | Age: 57
End: 2022-08-23

## 2022-08-23 NOTE — TELEPHONE ENCOUNTER
Patient cancelled appointment  3-6 month copd NPT ref by Yolie eaton  with Dr STEVE Evans 112 on 8/23/22    Same Day Cancellation: Yes    Patient rescheduled:  Pt will call back after CT has been completed to reschedule    New appointment:     Patient was also no show on: 10/18/2018    Last OV 02/10/2022         Assessment:       Moderate obstructive airway disease secondary to COPD and Asthma   Abnormal CXR with RENE nodule 3/14/21- not vitaliy to get CT   Allergic rhinitis   Pulmonary nodules. Remote granulomatous disease. Negative Bronch. Repeated CT chest 11/2009 showed parenchymal scarring within each lung which are unchanged or improved since 2007  66 pack year smoking - quit 11/2015  Declined Xolair shots                 Plan:       Continue Symbicort, Spiriva, and Albuterol INH/Neb PRN   Medications refills today   Advised to get her CXR PA and lateral - not able to lay flat for CT. She refuses vaccines due to egg allergy.  Risks, benefits and side effects were discussed with patient  Peak flow meter monitoring  Advised to continue with smoking cessation   Follow up in 3-6 months or sooner if needed

## 2022-09-07 NOTE — TELEPHONE ENCOUNTER
Pt called back - he said that he has no intention of completing a sleep study at any point. He also said that he'll call back to reschedule the appointment after he's completed the chest x-ray. When asked about the CT chest order in his chart from May, he said that he explained to Dr. Nery Jarvis that he cannot do a Chest CT because he can't lay flat on his chest and he went on to say that he cannot take any sedating meds to enable him to complete a chest CT because he has to drive himself to and from the appt. He will call back when he's ready to reschedule.

## 2022-10-20 ENCOUNTER — HOSPITAL ENCOUNTER (OUTPATIENT)
Dept: GENERAL RADIOLOGY | Age: 57
Discharge: HOME | End: 2022-10-20
Payer: COMMERCIAL

## 2022-10-20 ENCOUNTER — HOSPITAL ENCOUNTER (OUTPATIENT)
Age: 57
Discharge: HOME | End: 2022-10-20
Payer: COMMERCIAL

## 2022-10-20 DIAGNOSIS — R93.89 ABNORMAL CXR: ICD-10-CM

## 2022-10-20 DIAGNOSIS — R93.89 ABNORMAL CXR: Primary | ICD-10-CM

## 2022-10-20 PROCEDURE — 71046 X-RAY EXAM CHEST 2 VIEWS: CPT

## 2022-10-20 RX ORDER — BUDESONIDE AND FORMOTEROL FUMARATE DIHYDRATE 160; 4.5 UG/1; UG/1
2 AEROSOL RESPIRATORY (INHALATION) 2 TIMES DAILY
Qty: 10.2 G | Refills: 2 | Status: SHIPPED | OUTPATIENT
Start: 2022-10-20

## 2022-10-20 RX ORDER — LORATADINE 10 MG/1
10 TABLET ORAL DAILY
Qty: 30 TABLET | Refills: 2 | Status: SHIPPED | OUTPATIENT
Start: 2022-10-20 | End: 2022-11-19

## 2022-10-20 RX ORDER — ALBUTEROL SULFATE 90 UG/1
2 AEROSOL, METERED RESPIRATORY (INHALATION) 4 TIMES DAILY PRN
Qty: 54 G | Refills: 2 | Status: SHIPPED | OUTPATIENT
Start: 2022-10-20 | End: 2022-10-26

## 2022-10-20 RX ORDER — TIOTROPIUM BROMIDE 18 UG/1
18 CAPSULE ORAL; RESPIRATORY (INHALATION) DAILY
Qty: 30 CAPSULE | Refills: 2 | Status: SHIPPED | OUTPATIENT
Start: 2022-10-20 | End: 2022-10-26

## 2022-10-26 DIAGNOSIS — J44.9 CHRONIC OBSTRUCTIVE PULMONARY DISEASE, UNSPECIFIED COPD TYPE (HCC): Primary | ICD-10-CM

## 2022-10-26 DIAGNOSIS — J45.40 MODERATE PERSISTENT ASTHMA WITHOUT COMPLICATION: ICD-10-CM

## 2022-10-26 RX ORDER — ALBUTEROL SULFATE 2.5 MG/3ML
SOLUTION RESPIRATORY (INHALATION)
Qty: 360 ML | Refills: 5 | Status: SHIPPED | OUTPATIENT
Start: 2022-10-26

## 2022-11-21 ENCOUNTER — HOSPITAL ENCOUNTER (INPATIENT)
Age: 57
LOS: 3 days | Discharge: HOME | DRG: 113 | End: 2022-11-25
Attending: STUDENT IN AN ORGANIZED HEALTH CARE EDUCATION/TRAINING PROGRAM | Admitting: INTERNAL MEDICINE
Payer: COMMERCIAL

## 2022-11-21 ENCOUNTER — APPOINTMENT (OUTPATIENT)
Dept: GENERAL RADIOLOGY | Age: 57
DRG: 113 | End: 2022-11-21
Payer: COMMERCIAL

## 2022-11-21 DIAGNOSIS — J96.02 ACUTE RESPIRATORY FAILURE WITH HYPOXIA AND HYPERCARBIA (HCC): ICD-10-CM

## 2022-11-21 DIAGNOSIS — I50.9 CONGESTIVE HEART FAILURE, UNSPECIFIED HF CHRONICITY, UNSPECIFIED HEART FAILURE TYPE (HCC): ICD-10-CM

## 2022-11-21 DIAGNOSIS — J10.1 INFLUENZA A: ICD-10-CM

## 2022-11-21 DIAGNOSIS — R09.02 HYPOXIA: Primary | ICD-10-CM

## 2022-11-21 DIAGNOSIS — I50.32 CHRONIC DIASTOLIC CHF (CONGESTIVE HEART FAILURE) (HCC): ICD-10-CM

## 2022-11-21 DIAGNOSIS — E87.1 HYPONATREMIA: ICD-10-CM

## 2022-11-21 DIAGNOSIS — J96.01 ACUTE RESPIRATORY FAILURE WITH HYPOXIA AND HYPERCARBIA (HCC): ICD-10-CM

## 2022-11-21 LAB
INFLUENZA A: DETECTED
INFLUENZA B: NOT DETECTED
SARS-COV-2 RNA, RT PCR: NOT DETECTED

## 2022-11-21 PROCEDURE — 99285 EMERGENCY DEPT VISIT HI MDM: CPT

## 2022-11-21 PROCEDURE — 71045 X-RAY EXAM CHEST 1 VIEW: CPT

## 2022-11-21 PROCEDURE — 87636 SARSCOV2 & INF A&B AMP PRB: CPT

## 2022-11-21 RX ORDER — IPRATROPIUM BROMIDE AND ALBUTEROL SULFATE 2.5; .5 MG/3ML; MG/3ML
1 SOLUTION RESPIRATORY (INHALATION) ONCE
Status: COMPLETED | OUTPATIENT
Start: 2022-11-21 | End: 2022-11-22

## 2022-11-21 RX ORDER — ACETAMINOPHEN 325 MG/1
650 TABLET ORAL ONCE
Status: COMPLETED | OUTPATIENT
Start: 2022-11-21 | End: 2022-11-22

## 2022-11-21 ASSESSMENT — PAIN - FUNCTIONAL ASSESSMENT: PAIN_FUNCTIONAL_ASSESSMENT: NONE - DENIES PAIN

## 2022-11-22 ENCOUNTER — APPOINTMENT (OUTPATIENT)
Dept: CT IMAGING | Age: 57
DRG: 113 | End: 2022-11-22
Payer: COMMERCIAL

## 2022-11-22 PROBLEM — I50.32 CHRONIC DIASTOLIC CHF (CONGESTIVE HEART FAILURE) (HCC): Status: ACTIVE | Noted: 2022-11-22

## 2022-11-22 PROBLEM — R09.02 HYPOXIA: Status: ACTIVE | Noted: 2022-11-22

## 2022-11-22 PROBLEM — J96.01 ACUTE RESPIRATORY FAILURE WITH HYPOXIA AND HYPERCAPNIA (HCC): Status: ACTIVE | Noted: 2022-11-22

## 2022-11-22 PROBLEM — J96.02 ACUTE RESPIRATORY FAILURE WITH HYPOXIA AND HYPERCAPNIA (HCC): Status: ACTIVE | Noted: 2022-11-22

## 2022-11-22 PROBLEM — J44.1 COPD EXACERBATION (HCC): Status: ACTIVE | Noted: 2021-05-07

## 2022-11-22 PROBLEM — J10.1 INFLUENZA A: Status: ACTIVE | Noted: 2022-11-22

## 2022-11-22 LAB
A/G RATIO: 1.3 (ref 1.1–2.2)
A/G RATIO: 1.4 (ref 1.1–2.2)
ALBUMIN SERPL-MCNC: 4 G/DL (ref 3.4–5)
ALBUMIN SERPL-MCNC: 4.1 G/DL (ref 3.4–5)
ALP BLD-CCNC: 68 U/L (ref 40–129)
ALP BLD-CCNC: 72 U/L (ref 40–129)
ALT SERPL-CCNC: 12 U/L (ref 10–40)
ALT SERPL-CCNC: 15 U/L (ref 10–40)
ANION GAP SERPL CALCULATED.3IONS-SCNC: 13 MMOL/L (ref 3–16)
ANION GAP SERPL CALCULATED.3IONS-SCNC: 9 MMOL/L (ref 3–16)
AST SERPL-CCNC: 20 U/L (ref 15–37)
AST SERPL-CCNC: 35 U/L (ref 15–37)
BASE EXCESS ARTERIAL: 0.4 MMOL/L (ref -3–3)
BASE EXCESS ARTERIAL: 6 (ref -3–3)
BASE EXCESS VENOUS: 1.2 MMOL/L (ref -3–3)
BASOPHILS ABSOLUTE: 0 K/UL (ref 0–0.2)
BASOPHILS ABSOLUTE: 0 K/UL (ref 0–0.2)
BASOPHILS RELATIVE PERCENT: 0.4 %
BASOPHILS RELATIVE PERCENT: 0.7 %
BILIRUB SERPL-MCNC: 0.3 MG/DL (ref 0–1)
BILIRUB SERPL-MCNC: 0.5 MG/DL (ref 0–1)
BUN BLDV-MCNC: 18 MG/DL (ref 7–20)
BUN BLDV-MCNC: 18 MG/DL (ref 7–20)
CALCIUM SERPL-MCNC: 9 MG/DL (ref 8.3–10.6)
CALCIUM SERPL-MCNC: 9.4 MG/DL (ref 8.3–10.6)
CARBOXYHEMOGLOBIN ARTERIAL: 1.8 % (ref 0–1.5)
CARBOXYHEMOGLOBIN: 4 % (ref 0–1.5)
CHLORIDE BLD-SCNC: 94 MMOL/L (ref 99–110)
CHLORIDE BLD-SCNC: 95 MMOL/L (ref 99–110)
CO2: 27 MMOL/L (ref 21–32)
CO2: 29 MMOL/L (ref 21–32)
CREAT SERPL-MCNC: 1.2 MG/DL (ref 0.6–1.1)
CREAT SERPL-MCNC: 1.2 MG/DL (ref 0.6–1.1)
EKG ATRIAL RATE: 71 BPM
EKG DIAGNOSIS: NORMAL
EKG P AXIS: 66 DEGREES
EKG P-R INTERVAL: 146 MS
EKG Q-T INTERVAL: 380 MS
EKG QRS DURATION: 88 MS
EKG QTC CALCULATION (BAZETT): 412 MS
EKG R AXIS: 109 DEGREES
EKG T AXIS: 180 DEGREES
EKG VENTRICULAR RATE: 71 BPM
EOSINOPHILS ABSOLUTE: 0 K/UL (ref 0–0.6)
EOSINOPHILS ABSOLUTE: 0 K/UL (ref 0–0.6)
EOSINOPHILS RELATIVE PERCENT: 0.1 %
EOSINOPHILS RELATIVE PERCENT: 0.1 %
GFR SERPL CREATININE-BSD FRML MDRD: 53 ML/MIN/{1.73_M2}
GFR SERPL CREATININE-BSD FRML MDRD: 53 ML/MIN/{1.73_M2}
GLUCOSE BLD-MCNC: 118 MG/DL (ref 70–99)
GLUCOSE BLD-MCNC: 127 MG/DL (ref 70–99)
HCO3 ARTERIAL: 30.8 MMOL/L (ref 21–29)
HCO3 ARTERIAL: 31.7 MMOL/L (ref 21–29)
HCO3 VENOUS: 28.3 MMOL/L (ref 23–29)
HCT VFR BLD CALC: 45.8 % (ref 36–48)
HCT VFR BLD CALC: 49.7 % (ref 36–48)
HEMOGLOBIN, ART, EXTENDED: 16.8 G/DL (ref 12–16)
HEMOGLOBIN: 15.3 G/DL (ref 12–16)
HEMOGLOBIN: 16.5 G/DL (ref 12–16)
LACTIC ACID: 1.1 MMOL/L (ref 0.4–2)
LYMPHOCYTES ABSOLUTE: 0.3 K/UL (ref 1–5.1)
LYMPHOCYTES ABSOLUTE: 1.1 K/UL (ref 1–5.1)
LYMPHOCYTES RELATIVE PERCENT: 13.4 %
LYMPHOCYTES RELATIVE PERCENT: 4.9 %
MCH RBC QN AUTO: 33 PG (ref 26–34)
MCH RBC QN AUTO: 33.3 PG (ref 26–34)
MCHC RBC AUTO-ENTMCNC: 33.1 G/DL (ref 31–36)
MCHC RBC AUTO-ENTMCNC: 33.3 G/DL (ref 31–36)
MCV RBC AUTO: 100.7 FL (ref 80–100)
MCV RBC AUTO: 98.8 FL (ref 80–100)
METHEMOGLOBIN ARTERIAL: 0.1 %
METHEMOGLOBIN VENOUS: 0 %
MONOCYTES ABSOLUTE: 0.9 K/UL (ref 0–1.3)
MONOCYTES ABSOLUTE: 1.4 K/UL (ref 0–1.3)
MONOCYTES RELATIVE PERCENT: 12.8 %
MONOCYTES RELATIVE PERCENT: 17.8 %
NEUTROPHILS ABSOLUTE: 5.5 K/UL (ref 1.7–7.7)
NEUTROPHILS ABSOLUTE: 5.7 K/UL (ref 1.7–7.7)
NEUTROPHILS RELATIVE PERCENT: 68.3 %
NEUTROPHILS RELATIVE PERCENT: 81.5 %
O2 SAT, ARTERIAL: 86.9 %
O2 SAT, ARTERIAL: 99 % (ref 93–100)
O2 SAT, VEN: 89 %
O2 THERAPY: ABNORMAL
O2 THERAPY: ABNORMAL
PCO2 ARTERIAL: 55.6 MM HG (ref 35–45)
PCO2 ARTERIAL: 75.4 MMHG (ref 35–45)
PCO2, VEN: 54.2 MMHG (ref 40–50)
PDW BLD-RTO: 14.7 % (ref 12.4–15.4)
PDW BLD-RTO: 15.4 % (ref 12.4–15.4)
PERFORMED ON: ABNORMAL
PH ARTERIAL: 7.23 (ref 7.35–7.45)
PH ARTERIAL: 7.36 (ref 7.35–7.45)
PH VENOUS: 7.34 (ref 7.35–7.45)
PLATELET # BLD: 138 K/UL (ref 135–450)
PLATELET # BLD: 140 K/UL (ref 135–450)
PMV BLD AUTO: 9.7 FL (ref 5–10.5)
PMV BLD AUTO: 9.9 FL (ref 5–10.5)
PO2 ARTERIAL: 126.5 MM HG (ref 75–108)
PO2 ARTERIAL: 62.9 MMHG (ref 75–108)
PO2, VEN: 59 MMHG (ref 25–40)
POC SAMPLE TYPE: ABNORMAL
POTASSIUM REFLEX MAGNESIUM: 4.2 MMOL/L (ref 3.5–5.1)
POTASSIUM REFLEX MAGNESIUM: 5 MMOL/L (ref 3.5–5.1)
PRO-BNP: ABNORMAL PG/ML (ref 0–124)
RBC # BLD: 4.64 M/UL (ref 4–5.2)
RBC # BLD: 4.94 M/UL (ref 4–5.2)
REASON FOR REJECTION: NORMAL
REJECTED TEST: NORMAL
SODIUM BLD-SCNC: 132 MMOL/L (ref 136–145)
SODIUM BLD-SCNC: 135 MMOL/L (ref 136–145)
TCO2 ARTERIAL: 33.1 MMOL/L
TCO2 CALC VENOUS: 30 MMOL/L
TOTAL PROTEIN: 7 G/DL (ref 6.4–8.2)
TOTAL PROTEIN: 7 G/DL (ref 6.4–8.2)
TROPONIN: <0.01 NG/ML
WBC # BLD: 7 K/UL (ref 4–11)
WBC # BLD: 8 K/UL (ref 4–11)

## 2022-11-22 PROCEDURE — 6360000002 HC RX W HCPCS: Performed by: INTERNAL MEDICINE

## 2022-11-22 PROCEDURE — 6360000004 HC RX CONTRAST MEDICATION: Performed by: STUDENT IN AN ORGANIZED HEALTH CARE EDUCATION/TRAINING PROGRAM

## 2022-11-22 PROCEDURE — 94640 AIRWAY INHALATION TREATMENT: CPT

## 2022-11-22 PROCEDURE — 6370000000 HC RX 637 (ALT 250 FOR IP): Performed by: INTERNAL MEDICINE

## 2022-11-22 PROCEDURE — 96365 THER/PROPH/DIAG IV INF INIT: CPT

## 2022-11-22 PROCEDURE — 96375 TX/PRO/DX INJ NEW DRUG ADDON: CPT

## 2022-11-22 PROCEDURE — 99291 CRITICAL CARE FIRST HOUR: CPT | Performed by: INTERNAL MEDICINE

## 2022-11-22 PROCEDURE — 2580000003 HC RX 258: Performed by: INTERNAL MEDICINE

## 2022-11-22 PROCEDURE — 6370000000 HC RX 637 (ALT 250 FOR IP): Performed by: STUDENT IN AN ORGANIZED HEALTH CARE EDUCATION/TRAINING PROGRAM

## 2022-11-22 PROCEDURE — 85025 COMPLETE CBC W/AUTO DIFF WBC: CPT

## 2022-11-22 PROCEDURE — 94660 CPAP INITIATION&MGMT: CPT

## 2022-11-22 PROCEDURE — 2000000000 HC ICU R&B

## 2022-11-22 PROCEDURE — 82803 BLOOD GASES ANY COMBINATION: CPT

## 2022-11-22 PROCEDURE — 84484 ASSAY OF TROPONIN QUANT: CPT

## 2022-11-22 PROCEDURE — 71260 CT THORAX DX C+: CPT | Performed by: STUDENT IN AN ORGANIZED HEALTH CARE EDUCATION/TRAINING PROGRAM

## 2022-11-22 PROCEDURE — 99223 1ST HOSP IP/OBS HIGH 75: CPT | Performed by: NURSE PRACTITIONER

## 2022-11-22 PROCEDURE — 87040 BLOOD CULTURE FOR BACTERIA: CPT

## 2022-11-22 PROCEDURE — 36600 WITHDRAWAL OF ARTERIAL BLOOD: CPT

## 2022-11-22 PROCEDURE — 6360000002 HC RX W HCPCS: Performed by: STUDENT IN AN ORGANIZED HEALTH CARE EDUCATION/TRAINING PROGRAM

## 2022-11-22 PROCEDURE — 93005 ELECTROCARDIOGRAM TRACING: CPT | Performed by: STUDENT IN AN ORGANIZED HEALTH CARE EDUCATION/TRAINING PROGRAM

## 2022-11-22 PROCEDURE — 83880 ASSAY OF NATRIURETIC PEPTIDE: CPT

## 2022-11-22 PROCEDURE — 96372 THER/PROPH/DIAG INJ SC/IM: CPT

## 2022-11-22 PROCEDURE — 94761 N-INVAS EAR/PLS OXIMETRY MLT: CPT

## 2022-11-22 PROCEDURE — 80053 COMPREHEN METABOLIC PANEL: CPT

## 2022-11-22 PROCEDURE — 83605 ASSAY OF LACTIC ACID: CPT

## 2022-11-22 PROCEDURE — 93010 ELECTROCARDIOGRAM REPORT: CPT | Performed by: INTERNAL MEDICINE

## 2022-11-22 PROCEDURE — 2700000000 HC OXYGEN THERAPY PER DAY

## 2022-11-22 PROCEDURE — 36415 COLL VENOUS BLD VENIPUNCTURE: CPT

## 2022-11-22 RX ORDER — IPRATROPIUM BROMIDE AND ALBUTEROL SULFATE 2.5; .5 MG/3ML; MG/3ML
1 SOLUTION RESPIRATORY (INHALATION)
Status: DISCONTINUED | OUTPATIENT
Start: 2022-11-22 | End: 2022-11-24

## 2022-11-22 RX ORDER — RISPERIDONE 1 MG/1
1 TABLET ORAL
COMMUNITY
Start: 2022-10-06

## 2022-11-22 RX ORDER — ALBUTEROL SULFATE 2.5 MG/3ML
2.5 SOLUTION RESPIRATORY (INHALATION) EVERY 4 HOURS PRN
Status: DISCONTINUED | OUTPATIENT
Start: 2022-11-22 | End: 2022-11-25 | Stop reason: HOSPADM

## 2022-11-22 RX ORDER — FAMOTIDINE 20 MG/1
20 TABLET, FILM COATED ORAL 2 TIMES DAILY
Status: DISCONTINUED | OUTPATIENT
Start: 2022-11-22 | End: 2022-11-25 | Stop reason: HOSPADM

## 2022-11-22 RX ORDER — PREDNISONE 20 MG/1
40 TABLET ORAL DAILY
Status: DISCONTINUED | OUTPATIENT
Start: 2022-11-24 | End: 2022-11-25 | Stop reason: HOSPADM

## 2022-11-22 RX ORDER — BUPROPION HYDROCHLORIDE 300 MG/1
300 TABLET ORAL DAILY
Status: DISCONTINUED | OUTPATIENT
Start: 2022-11-22 | End: 2022-11-25 | Stop reason: HOSPADM

## 2022-11-22 RX ORDER — METHYLPREDNISOLONE SODIUM SUCCINATE 40 MG/ML
40 INJECTION, POWDER, LYOPHILIZED, FOR SOLUTION INTRAMUSCULAR; INTRAVENOUS EVERY 12 HOURS
Status: COMPLETED | OUTPATIENT
Start: 2022-11-22 | End: 2022-11-23

## 2022-11-22 RX ORDER — ENOXAPARIN SODIUM 100 MG/ML
30 INJECTION SUBCUTANEOUS 2 TIMES DAILY
Status: DISCONTINUED | OUTPATIENT
Start: 2022-11-22 | End: 2022-11-25 | Stop reason: HOSPADM

## 2022-11-22 RX ORDER — ASPIRIN 81 MG/1
81 TABLET ORAL DAILY
Status: DISCONTINUED | OUTPATIENT
Start: 2022-11-22 | End: 2022-11-25 | Stop reason: HOSPADM

## 2022-11-22 RX ORDER — ONDANSETRON 2 MG/ML
4 INJECTION INTRAMUSCULAR; INTRAVENOUS EVERY 6 HOURS PRN
Status: DISCONTINUED | OUTPATIENT
Start: 2022-11-22 | End: 2022-11-25 | Stop reason: HOSPADM

## 2022-11-22 RX ORDER — ACETAMINOPHEN 325 MG/1
650 TABLET ORAL EVERY 6 HOURS PRN
Status: DISCONTINUED | OUTPATIENT
Start: 2022-11-22 | End: 2022-11-25 | Stop reason: HOSPADM

## 2022-11-22 RX ORDER — SODIUM CHLORIDE 9 MG/ML
INJECTION, SOLUTION INTRAVENOUS PRN
Status: DISCONTINUED | OUTPATIENT
Start: 2022-11-22 | End: 2022-11-25 | Stop reason: HOSPADM

## 2022-11-22 RX ORDER — MECLIZINE HYDROCHLORIDE 25 MG/1
25 TABLET ORAL 3 TIMES DAILY PRN
Status: DISCONTINUED | OUTPATIENT
Start: 2022-11-22 | End: 2022-11-25 | Stop reason: HOSPADM

## 2022-11-22 RX ORDER — MECLIZINE HYDROCHLORIDE 25 MG/1
25 TABLET ORAL 3 TIMES DAILY PRN
Status: DISCONTINUED | OUTPATIENT
Start: 2022-11-22 | End: 2022-11-22

## 2022-11-22 RX ORDER — FUROSEMIDE 10 MG/ML
40 INJECTION INTRAMUSCULAR; INTRAVENOUS ONCE
Status: COMPLETED | OUTPATIENT
Start: 2022-11-22 | End: 2022-11-22

## 2022-11-22 RX ORDER — FUROSEMIDE 40 MG/1
40 TABLET ORAL DAILY
Status: DISCONTINUED | OUTPATIENT
Start: 2022-11-23 | End: 2022-11-25 | Stop reason: HOSPADM

## 2022-11-22 RX ORDER — ASPIRIN 81 MG/1
1 TABLET, COATED ORAL
COMMUNITY
Start: 2022-10-24

## 2022-11-22 RX ORDER — IPRATROPIUM BROMIDE AND ALBUTEROL SULFATE 2.5; .5 MG/3ML; MG/3ML
1 SOLUTION RESPIRATORY (INHALATION)
Status: DISCONTINUED | OUTPATIENT
Start: 2022-11-22 | End: 2022-11-22

## 2022-11-22 RX ORDER — ONDANSETRON 4 MG/1
4 TABLET, ORALLY DISINTEGRATING ORAL EVERY 8 HOURS PRN
Status: DISCONTINUED | OUTPATIENT
Start: 2022-11-22 | End: 2022-11-25 | Stop reason: HOSPADM

## 2022-11-22 RX ORDER — HYDROXYZINE HYDROCHLORIDE 25 MG/1
25 TABLET, FILM COATED ORAL NIGHTLY
Status: DISCONTINUED | OUTPATIENT
Start: 2022-11-22 | End: 2022-11-25 | Stop reason: HOSPADM

## 2022-11-22 RX ORDER — GUAIFENESIN/DEXTROMETHORPHAN 100-10MG/5
5 SYRUP ORAL EVERY 4 HOURS PRN
Status: DISCONTINUED | OUTPATIENT
Start: 2022-11-22 | End: 2022-11-25 | Stop reason: HOSPADM

## 2022-11-22 RX ORDER — ALBUTEROL SULFATE 2.5 MG/3ML
2.5 SOLUTION RESPIRATORY (INHALATION) EVERY 6 HOURS PRN
Status: DISCONTINUED | OUTPATIENT
Start: 2022-11-22 | End: 2022-11-22

## 2022-11-22 RX ORDER — ACETAMINOPHEN 650 MG/1
650 SUPPOSITORY RECTAL EVERY 6 HOURS PRN
Status: DISCONTINUED | OUTPATIENT
Start: 2022-11-22 | End: 2022-11-25 | Stop reason: HOSPADM

## 2022-11-22 RX ORDER — RISPERIDONE 1 MG/1
1 TABLET ORAL NIGHTLY
Status: DISCONTINUED | OUTPATIENT
Start: 2022-11-22 | End: 2022-11-25 | Stop reason: HOSPADM

## 2022-11-22 RX ORDER — TOPIRAMATE 25 MG/1
25 TABLET ORAL 2 TIMES DAILY
Status: DISCONTINUED | OUTPATIENT
Start: 2022-11-22 | End: 2022-11-25 | Stop reason: HOSPADM

## 2022-11-22 RX ORDER — SODIUM CHLORIDE 0.9 % (FLUSH) 0.9 %
5-40 SYRINGE (ML) INJECTION PRN
Status: DISCONTINUED | OUTPATIENT
Start: 2022-11-22 | End: 2022-11-25 | Stop reason: HOSPADM

## 2022-11-22 RX ORDER — SODIUM CHLORIDE 0.9 % (FLUSH) 0.9 %
5-40 SYRINGE (ML) INJECTION EVERY 12 HOURS SCHEDULED
Status: DISCONTINUED | OUTPATIENT
Start: 2022-11-22 | End: 2022-11-25 | Stop reason: HOSPADM

## 2022-11-22 RX ORDER — LORAZEPAM 2 MG/ML
1 INJECTION INTRAMUSCULAR ONCE
Status: COMPLETED | OUTPATIENT
Start: 2022-11-22 | End: 2022-11-22

## 2022-11-22 RX ORDER — OSELTAMIVIR PHOSPHATE 75 MG/1
75 CAPSULE ORAL 2 TIMES DAILY
Status: DISCONTINUED | OUTPATIENT
Start: 2022-11-22 | End: 2022-11-25 | Stop reason: HOSPADM

## 2022-11-22 RX ORDER — CETIRIZINE HYDROCHLORIDE 10 MG/1
5 TABLET ORAL DAILY
Status: DISCONTINUED | OUTPATIENT
Start: 2022-11-22 | End: 2022-11-25 | Stop reason: HOSPADM

## 2022-11-22 RX ORDER — POLYETHYLENE GLYCOL 3350 17 G/17G
17 POWDER, FOR SOLUTION ORAL DAILY PRN
Status: DISCONTINUED | OUTPATIENT
Start: 2022-11-22 | End: 2022-11-25 | Stop reason: HOSPADM

## 2022-11-22 RX ORDER — NICOTINE 21 MG/24HR
1 PATCH, TRANSDERMAL 24 HOURS TRANSDERMAL DAILY
Status: DISCONTINUED | OUTPATIENT
Start: 2022-11-22 | End: 2022-11-25 | Stop reason: HOSPADM

## 2022-11-22 RX ADMIN — ACETAMINOPHEN 650 MG: 325 TABLET ORAL at 00:41

## 2022-11-22 RX ADMIN — IPRATROPIUM BROMIDE AND ALBUTEROL SULFATE 1 AMPULE: 2.5; .5 SOLUTION RESPIRATORY (INHALATION) at 19:04

## 2022-11-22 RX ADMIN — ENOXAPARIN SODIUM 30 MG: 100 INJECTION SUBCUTANEOUS at 20:30

## 2022-11-22 RX ADMIN — ENOXAPARIN SODIUM 30 MG: 100 INJECTION SUBCUTANEOUS at 09:43

## 2022-11-22 RX ADMIN — METHYLPREDNISOLONE SODIUM SUCCINATE 40 MG: 40 INJECTION, POWDER, FOR SOLUTION INTRAMUSCULAR; INTRAVENOUS at 16:40

## 2022-11-22 RX ADMIN — ASPIRIN 81 MG: 81 TABLET, COATED ORAL at 09:42

## 2022-11-22 RX ADMIN — TOPIRAMATE 25 MG: 25 TABLET, FILM COATED ORAL at 09:42

## 2022-11-22 RX ADMIN — CETIRIZINE HYDROCHLORIDE 5 MG: 10 TABLET ORAL at 09:42

## 2022-11-22 RX ADMIN — CEFTRIAXONE SODIUM 1000 MG: 1 INJECTION, POWDER, FOR SOLUTION INTRAMUSCULAR; INTRAVENOUS at 05:34

## 2022-11-22 RX ADMIN — LORAZEPAM 1 MG: 2 INJECTION INTRAMUSCULAR; INTRAVENOUS at 03:18

## 2022-11-22 RX ADMIN — OSELTAMIVIR PHOSPHATE 75 MG: 75 CAPSULE ORAL at 20:30

## 2022-11-22 RX ADMIN — IPRATROPIUM BROMIDE AND ALBUTEROL SULFATE 1 AMPULE: 2.5; .5 SOLUTION RESPIRATORY (INHALATION) at 10:43

## 2022-11-22 RX ADMIN — IOPAMIDOL 85 ML: 755 INJECTION, SOLUTION INTRAVENOUS at 03:21

## 2022-11-22 RX ADMIN — Medication 10 ML: at 20:30

## 2022-11-22 RX ADMIN — IPRATROPIUM BROMIDE AND ALBUTEROL SULFATE 1 AMPULE: 2.5; .5 SOLUTION RESPIRATORY (INHALATION) at 00:43

## 2022-11-22 RX ADMIN — MUPIROCIN: 20 OINTMENT TOPICAL at 20:30

## 2022-11-22 RX ADMIN — IPRATROPIUM BROMIDE AND ALBUTEROL SULFATE 1 AMPULE: 2.5; .5 SOLUTION RESPIRATORY (INHALATION) at 08:28

## 2022-11-22 RX ADMIN — ALBUTEROL SULFATE 2.5 MG: 2.5 SOLUTION RESPIRATORY (INHALATION) at 05:29

## 2022-11-22 RX ADMIN — IPRATROPIUM BROMIDE AND ALBUTEROL SULFATE 1 AMPULE: 2.5; .5 SOLUTION RESPIRATORY (INHALATION) at 03:07

## 2022-11-22 RX ADMIN — HYDROXYZINE HYDROCHLORIDE 25 MG: 25 TABLET ORAL at 20:30

## 2022-11-22 RX ADMIN — Medication 10 ML: at 09:43

## 2022-11-22 RX ADMIN — OSELTAMIVIR PHOSPHATE 75 MG: 75 CAPSULE ORAL at 05:35

## 2022-11-22 RX ADMIN — FAMOTIDINE 20 MG: 20 TABLET ORAL at 20:30

## 2022-11-22 RX ADMIN — BUPROPION HYDROCHLORIDE 300 MG: 150 TABLET, EXTENDED RELEASE ORAL at 09:42

## 2022-11-22 RX ADMIN — IPRATROPIUM BROMIDE AND ALBUTEROL SULFATE 1 AMPULE: 2.5; .5 SOLUTION RESPIRATORY (INHALATION) at 15:25

## 2022-11-22 RX ADMIN — FUROSEMIDE 40 MG: 10 INJECTION, SOLUTION INTRAMUSCULAR; INTRAVENOUS at 02:27

## 2022-11-22 RX ADMIN — FAMOTIDINE 20 MG: 20 TABLET ORAL at 09:42

## 2022-11-22 RX ADMIN — METHYLPREDNISOLONE SODIUM SUCCINATE 40 MG: 40 INJECTION, POWDER, FOR SOLUTION INTRAMUSCULAR; INTRAVENOUS at 05:26

## 2022-11-22 RX ADMIN — IPRATROPIUM BROMIDE AND ALBUTEROL SULFATE 1 AMPULE: 2.5; .5 SOLUTION RESPIRATORY (INHALATION) at 22:35

## 2022-11-22 ASSESSMENT — PAIN SCALES - GENERAL: PAINLEVEL_OUTOF10: 0

## 2022-11-22 NOTE — PROGRESS NOTES
Pt taken off BiPap & placed on 7 LNC. Pt tolerating dinner- encouraged small bites. Pt agrees to go back on BiPap later this evening. Assessment unchanged. Ankit Ortiz functioning correctly. L arm PIV WNL.   Jun Stoddard RN, BSN

## 2022-11-22 NOTE — PLAN OF CARE
56 yoF p/w R sided atypical CP, SOB/ROSALES, cough. She has chronic orthopnea and PND 2/2 Hx CHF. She was found to be hypoxic in the ER. She is requiring 4 to 5 L O2. She was satting 76% at arrival on room air. She has history of COPD. She was found to be influenza A positive. She has not vaccinated for influenza or COVID secondary to egg allergy. CT PE did not demonstrate clear etiology. Acute respiratory failure with hypoxia and hypercapnia  Sepsis? Think most likely 2/2 INF A and res status. Melo;l give ceftriaxone for COPD coverage.    AE COPD  Influenza A  AE CHF  CHANELLE vs CKD  Smoker

## 2022-11-22 NOTE — PROGRESS NOTES
ABG drawn x 2 attempt(s) from radial artery. Patient had positive modified Kofi's Test.  Patient was on 45% (LPM/Fio2) oxygen per BIPAP (device). Pressure held x 5 minutes. No bleeding or bruising noted at puncture site. Patient tolerated procedure well    Pt placed on 7LHF at this time.

## 2022-11-22 NOTE — ED NOTES
EKG obtained and given to Dr. Gerald Archer. Patient denies chest pain and states she has CHF.      Angella Allan  11/22/22 0025

## 2022-11-22 NOTE — PROGRESS NOTES
RT Nebulizer Bronchodilator Protocol Note    There is a bronchodilator order in the chart from a provider indicating to follow the RT Bronchodilator Protocol and there is an Initiate RT Bronchodilator Protocol order as well (see protocol at bottom of note). CXR Findings:  XR CHEST PORTABLE    Result Date: 11/21/2022  Unchanged appearance of the chest without acute airspace disease identified. The findings from the last RT Protocol Assessment were as follows:  Smoking: Chronic pulmonary disease  Respiratory Pattern: Mild dyspnea at rest, irregular pattern, or RR 21-25 bpm  Breath Sounds: Inspiratory and expiratory or bilateral wheezing and/or rhonchi  Cough: Strong, spontaneous, non-productive  Indication for Bronchodilator Therapy: Wheezing associated with pulm disorder  Bronchodilator Assessment Score: 12    Aerosolized bronchodilator medication orders have been revised according to the RT Nebulizer Bronchodilator Protocol below. Respiratory Therapist to perform RT Therapy Protocol Assessment initially then follow the protocol. Repeat RT Therapy Protocol Assessment PRN for score 0-3 or on second treatment, BID, and PRN for scores above 3. No Indications - adjust the frequency to every 6 hours PRN wheezing or bronchospasm, if no treatments needed after 48 hours then discontinue using Per Protocol order mode. If indication present, adjust the RT bronchodilator orders based on the Bronchodilator Assessment Score as indicated below. If a patient is on this medication at home then do not decrease Frequency below that used at home. 0-3 - enter or revise RT bronchodilator order(s) to equivalent RT Bronchodilator order with Frequency of every 4 hours PRN for wheezing or increased work of breathing using Per Protocol order mode.        4-6 - enter or revise RT Bronchodilator order(s) to two equivalent RT bronchodilator orders with one order with BID Frequency and one order with Frequency of every 4

## 2022-11-22 NOTE — CONSULTS
Reason for referral and CC: Respiratory failure, Influenza A    HISTORY OF PRESENT ILLNESS:  55 yo female with a h/o COPD and Asthma presented to the ED in respiratory distress. Was lethargic when seen by medicine after admission and transferred to the ICU from the ED holding room. She is tachypnea and in distress on Bipap. Lethargic but able to follow commands.  + Flu    Past Medical History:   Diagnosis Date    Allergic rhinitis     Anxiety     Asthma     Bipolar disorder, mixed (HCC)     COPD (chronic obstructive pulmonary disease) ([facility])     [facility] 5/7/2021    Depression     Eczema     Pulmonary nodule     Tobacco abuse      Past Surgical History:   Procedure Laterality Date    BRONCHOSCOPY      TONSILLECTOMY       Family History  family history includes Diabetes in her mother; Hypertension in her father. Social History:  reports that she has been smoking cigarettes. She has a 68.00 pack-year smoking history. She has never used smokeless tobacco.   reports no history of alcohol use. ALLERGIES:  Patient is allergic to acetaminophen-codeine, darvocet [propoxyphene n-acetaminophen], eggs or egg-derived products, percocet [oxycodone-acetaminophen], shrimp flavor, and vicodin [hydrocodone-acetaminophen].   Continuous Infusions:   sodium chloride       Scheduled Meds:   ipratropium-albuterol  1 ampule Inhalation Q4H WA    buPROPion  300 mg Oral Daily    aspirin  81 mg Oral Daily    oseltamivir  75 mg Oral BID    famotidine  20 mg Oral BID    risperiDONE  1 mg Oral Nightly    cetirizine  5 mg Oral Daily    hydrOXYzine HCl  25 mg Oral Nightly    topiramate  25 mg Oral BID    sodium chloride flush  5-40 mL IntraVENous 2 times per day    enoxaparin  30 mg SubCUTAneous BID    cefTRIAXone (ROCEPHIN) IV  1,000 mg IntraVENous Q24H    nicotine  1 patch TransDERmal Daily    methylPREDNISolone  40 mg IntraVENous Q12H    Followed by    Gloria Randall ON 11/24/2022] predniSONE  40 mg Oral Daily    [START ON 11/23/2022] furosemide  40 mg Oral Daily     PRN Meds:  guaiFENesin-dextromethorphan, meclizine, sodium chloride flush, sodium chloride, ondansetron **OR** ondansetron, polyethylene glycol, acetaminophen **OR** acetaminophen, albuterol    REVIEW OF SYSTEMS:  Constitutional: Negative for fever  HENT: Negative for sore throat  Eyes: Negative for redness   Respiratory: + for dyspnea, cough  Cardiovascular: Negative for chest pain  Gastrointestinal: Negative for vomiting, diarrhea   Genitourinary: Negative for hematuria   Musculoskeletal: Negative for arthralgias   Skin: Negative for rash  Neurological: Negative for syncope  Hematological: Negative for adenopathy  Psychiatric/Behavorial: Negative for anxiety    PHYSICAL EXAM: BP (!) 149/72   Pulse 80   Temp 97.6 °F (36.4 °C) (Axillary)   Resp 19   Ht 5' 7\" (1.702 m)   Wt 260 lb (117.9 kg)   LMP 07/04/2016   SpO2 90%   BMI 40.72 kg/m²  on Bipap  Constitutional:  In acute distress. Eyes: PERRL. Conjunctivae anicteric. ENT: Normal nose. Normal tongue. Neck:  Trachea is midline. No thyroid tenderness. Respiratory: ++ accessory muscle usage. decreased breath sounds. No wheezes. No rales. No Rhonchi. Cardiovascular: Normal S1S2. No digit clubbing. No digit cyanosis. No LE edema. Gastrointestinal: No mass palpated. No tenderness palpated. Skin: No rash on the exposed extremities. No Nodules or induration on exposed extremities. Psychiatric: No anxiety or Agitation. Alert and Oriented to person, place and time.     CBC:   Recent Labs     11/22/22 0032 11/22/22  0620   WBC 7.0 8.0   HGB 15.3 16.5*   HCT 45.8 49.7*   MCV 98.8 100.7*    138     BMP:   Recent Labs     11/22/22 0032 11/22/22  0827   * 135*   K 4.2 5.0   CL 94* 95*   CO2 29 27   BUN 18 18   CREATININE 1.2* 1.2*        Recent Labs     11/22/22 0032 11/22/22  0827   AST 20 35   ALT 12 15   BILITOT 0.5 0.3   ALKPHOS 68 72     No results for input(s): PROTIME, INR in the last 72 hours. No results for input(s): NITRITE, COLORU, PHUR, LABCAST, WBCUA, RBCUA, MUCUS, TRICHOMONAS, YEAST, BACTERIA, CLARITYU, SPECGRAV, LEUKOCYTESUR, UROBILINOGEN, BILIRUBINUR, BLOODU, GLUCOSEU, AMORPHOUS in the last 72 hours. Invalid input(s): Genevieve Kothari  Recent Labs     11/22/22  0927   PHART 7.229*   [id number] 75.4*   PO2ART 62.9*       Chest imaging was reviewed by me and showed 11/22/22 CTPA: no PE  1. No evidence of pulmonary embolism or acute pulmonary abnormality. 2.  Emphysema. Unchanged appearance of bilateral pulmonary scars dating back   to at least 2009. ASSESSMENT:  Acute Hypercarbic and hypoxic respiratory failure   Influenza A  COPD and Asthma with acute exacerbation  Chronic diastolic CHF  CKD    PLAN:  Noninvasive positive pressure ventilation per my orders  Supplemental oxygen to maintain SaO2 >92%; wean as tolerated  Intensive inhaled bronchodilator therapy. IV solumedrol 40 mg IV Q12 hrs. Tamiflu D#1  Acapella QID to mobilize respiratory secretions  Lovenox DVT prophylaxis   Due to at least single organ failure and risk of rapid deterioration, I spent 32 minutes of Critical care time reviewing labs/films, examining patient, collaborating with other physicians. This does not include time performing critical procedures.      Thank you Deidre Rg* for this consult

## 2022-11-22 NOTE — PROGRESS NOTES
Pt placed back on BIPAP at this time while napping. Settings are 15/5, 40%. Pt is 92% and tolerating well at this time. Continuing to monitor pt.

## 2022-11-22 NOTE — PROGRESS NOTES
Pt arrived from ER and was placed on BIPAP. Settings are 14/5, 45%. Pt tolerating well at this time,but she is very anxious and WOB is very increased. Continuing to monitor pt at this time.

## 2022-11-22 NOTE — PROGRESS NOTES
Shift assessment completed, see flow sheet. Pt is A/O x 4. Pt is lying in bed c/o SOB. Pt was increased to 6 L from 4 L upon transferring rooms pt with desat transferring to bed reported SpO2 in the 70's. SpO2 93%. Respirations are easy, even, and unlabored. Bilateral lung sounds diminished w/wheeze throughout. RR 42  . NSR on the monitor      PIV, WNL   All lines and monitoring devices in place. Diane Torres is patent and secured. Bed in lowest position with wheels locked. No needs expressed at this time. Will continue to monitor.

## 2022-11-22 NOTE — PROGRESS NOTES
Pt c/o anxiety to RT, RR 40's- Dr. Julio coffman for precedex gtt. WOB still labored.   Clarence Barnes RN, BSN

## 2022-11-22 NOTE — PROGRESS NOTES
Bedside handoff given to Osiris JONAS for transfer to ICU.   Called Nany Dawkins to update transfer of unit

## 2022-11-22 NOTE — PROGRESS NOTES
Pt arrived to ICU room #4 from ER & moved to ICU bed. Pt awake & alert but drowsy. SOB with minimal/any exertion. Robin Lozano, RT at bedside to place pt on BiPap. PIV wnl. No new orders to be released/acknowledged.    Elodia Ko RN, BSN

## 2022-11-22 NOTE — H&P
Hospital Medicine History & Physical      PCP: Karla Ramirez RN, NP    Date of Admission: 11/21/2022    Date of Service: Pt seen/examined on 11/22/2022       Chief Complaint:    Chief Complaint   Patient presents with    Shortness of Breath     SOB x several hours. Pt denies wearing O2. States she uses an albuterol treatment every 6 hours but ran out of that several days ago. Is not flu or covid vaccinated. 76 % in triage on RA       History Of Present Illness: The patient is a 56 y.o. female with COPD, depression, anxiety, allergic rhinitis, Eczema, CHF, and h/o pulmonary nodule who presents to [facility] with c/o shortness of breath. She states onset was Monday. She has a cough at baseline. She has some pain in her right lung. Denies fevers. C/o fatigue and weakness. She does not wear oxygen at home. Patient febrile, tachypneic. Also hypoxic and requiring 6 L O2 currently. Labs with BNP 22,665, CKD? Flu a positive. CT negative for PE or acute abnormality, emphysema, unchanged appearance of pulmonary scars. Admitted to PCU on tele. Pulmonology consulted. Past Medical History:        Diagnosis Date    Allergic rhinitis     Anxiety     Asthma     Bipolar disorder, mixed (HCC)     COPD (chronic obstructive pulmonary disease) ([facility])     COPD (chronic obstructive pulmonary disease) ([facility] [address] 5/7/2021    Depression     Eczema     Pulmonary nodule     Tobacco abuse        Past Surgical History:        Procedure Laterality Date    BRONCHOSCOPY      TONSILLECTOMY         Medications Prior to Admission:    Prior to Admission medications    Medication Sig Start Date End Date Taking?  Authorizing Provider   tiotropium (SPIRIVA RESPIMAT) 2.5 MCG/ACT AERS inhaler INHALE 2 PUFFS BY MOUTH EVERY DAY 10/26/22   Jass Bear MD   ASPIRIN LOW DOSE 81 MG EC tablet 1 tablet 10/24/22   Historical Provider, MD   risperiDONE (RISPERDAL) 1 MG tablet 1 tablet 10/6/22   Historical Provider, MD   albuterol (PROVENTIL) (2.5 MG/3ML) 0.083% nebulizer solution inhale contents of 1 vial (3ml) via nebulizer every 6 hours as needed for wheezing or shortness of breath 10/26/22   Alize Vieira MD   budesonide-formoterol (SYMBICORT) 160-4.5 MCG/ACT AERO Inhale 2 puffs into the lungs 2 times daily 10/20/22   Alize Vieira MD   VENTOLIN  (90 Base) MCG/ACT inhaler INHALE 2 PUFFS BY MOUTH EVERY SIX HOURS AS NEEDED FOR wheezing or SHORTNESS OF BREATH 8/26/22   Pascale Lui MD   furosemide (LASIX) 40 MG tablet Take 1 tablet by mouth daily 3/31/22 5/15/22  Love Navarro MD   topiramate (TOPAMAX) 25 MG tablet Take 25 mg by mouth 2 times daily    Historical Provider, MD   buPROPion (WELLBUTRIN XL) 300 MG extended release tablet  4/29/21   Historical Provider, MD   loratadine (CLARITIN) 10 MG tablet Take 1 tablet by mouth daily 12/17/20   Alize Vieira MD   famotidine (PEPCID) 20 MG tablet TAKE 1 TABLET BY MOUTH TWO TIMES A DAY 11/11/20   Historical Provider, MD   meclizine (ANTIVERT) 25 MG tablet Take 25 mg by mouth 3 times daily as needed PRN    Historical Provider, MD   Cholecalciferol (VITAMIN D3) 1.25 MG (07891 UT) CAPS Take by mouth once a week    Historical Provider, MD   hydrOXYzine (VISTARIL) 50 MG capsule Take 25 mg by mouth nightly    Historical Provider, MD       Allergies:  Acetaminophen-codeine, Darvocet [propoxyphene n-acetaminophen], Eggs or egg-derived products, Percocet [oxycodone-acetaminophen], Shrimp flavor, and Vicodin [hydrocodone-acetaminophen]    Social History:  The patient currently lives at home. TOBACCO:   reports that she has been smoking cigarettes. She has a 68.00 pack-year smoking history. She has never used smokeless tobacco.  ETOH:   reports no history of alcohol use.       Family History:   Positive as follows:        Problem Relation Age of Onset    Diabetes Mother     Hypertension Father     Asthma Neg Hx     Cancer Neg Hx     Emphysema Neg Hx     Heart Failure Neg Hx        REVIEW OF SYSTEMS: Constitutional: Negative for fever +fatigue  Respiratory: + dyspnea, cough   Cardiovascular: Negative for chest pain   Gastrointestinal: Negative for vomiting, diarrhea   Genitourinary: Negative for hematuria   Musculoskeletal: Negative for arthralgias +weakness  Skin: Negative for rash   Neurological: Negative for syncope   Psychiatric/Behavorial: Negative for anxiety    PHYSICAL EXAM:    BP (!) 149/72   Pulse 80   Temp 97.6 °F (36.4 °C) (Axillary)   Resp 19   Ht 5' 7\" (1.702 m)   Wt 260 lb (117.9 kg)   LMP 07/04/2016   SpO2 90%   BMI 40.72 kg/m²     Gen: No distress. Lethargic  Eyes: PERRL. No sclera icterus. No conjunctival injection. ENT: No discharge. Pharynx clear. Neck: Trachea midline. Resp: + accessory muscle use, tachypnea. No crackles. LS diminished with expiratory wheezes. No rhonchi. CV: Regular rate. Regular rhythm. No murmur. No rub. Trace BLE edema. GI: Non-tender. Non-distended. Normal bowel sounds. No hernia. Skin: Warm and dry. No nodule on exposed extremities. No rash on exposed extremities. M/S: No cyanosis. No joint deformity. No clubbing. Neuro: Lethargic. Grossly nonfocal    Psych: Oriented x 3. No anxiety or agitation.      CBC:   Recent Labs     11/22/22 0032 11/22/22 0620   WBC 7.0 8.0   HGB 15.3 16.5*   HCT 45.8 49.7*   MCV 98.8 100.7*    138     BMP:   Recent Labs     11/22/22 0032   *   K 4.2   CL 94*   CO2 29   BUN 18   CREATININE 1.2*     LIVER PROFILE:   Recent Labs     11/22/22 0032   AST 20   ALT 12   BILITOT 0.5   ALKPHOS 68       CARDIAC ENZYMES  Recent Labs     11/22/22 0032   TROPONINI <0.01       U/A:    Lab Results   Component Value Date/Time    COLORU Yellow 03/14/2021 08:47 PM    WBCUA None seen 11/08/2019 09:36 PM    RBCUA 3-5 11/08/2019 09:36 PM    BACTERIA 2+ 11/08/2019 09:36 PM    CLARITYU Clear 03/14/2021 08:47 PM    SPECGRAV 1.020 03/14/2021 08:47 PM    LEUKOCYTESUR Negative 03/14/2021 08:47 PM    BLOODU Negative 03/14/2021 08:47 PM    GLUCOSEU Negative 03/14/2021 08:47 PM    GLUCOSEU Negative 09/10/2010 10:40 PM       CULTURES  Flu A: positive  Flu B: negative  COVID: negative  Blood: pending    EKG:  I have reviewed the EKG with the following interpretation:   Normal sinus rhythm, Rightward axis, ST & T wave abnormality, consider anterolateral ischemia. Abnormal ECG. When compared with ECG of 14-MAR-2021 21:49, QRS axis Shifted right ST now depressed in Inferior leads, Inverted T waves have replaced nonspecific T wave abnormality in Anterolateral leads, QT has lengthened    RADIOLOGY  CT CHEST PULMONARY EMBOLISM W CONTRAST   Final Result   1. No evidence of pulmonary embolism or acute pulmonary abnormality. 2.  Emphysema. Unchanged appearance of bilateral pulmonary scars dating back   to at least 2009. XR CHEST PORTABLE   Final Result   Unchanged appearance of the chest without acute airspace disease identified. Echo 5/6/2021   Summary   Technically difficult examination. Normal systolic function with an estimated ejection fraction of 55%. Mild concentric left ventricular hypertrophy. Flattened in systole consistent with right ventricular pressure overload. Normal left ventricular diastolic filling pressure. The right ventricle appears mildly dilated with normal function. The right atrium is mildly dilated. MIld pulmonic and tricuspid regurgitation. Systolic pulmonary artery pressure (SPAP) estimated at 54 mmHg (right atrial   pressure 3 mmHg), consistent with mild pulmonary hypertension. Principal Problem:    Acute respiratory failure with hypoxia and hypercapnia (HCC)  Resolved Problems:    * No resolved hospital problems.  *        ASSESSMENT/PLAN:  Acute hypoxic/hypercapnic respiratory failure  Influenza A  COPD exacerbation  -admitted for further management/care  -pulmonology consulted  -ordered ABALYCE  -Rocephin D#1  -Duonebs scheduled, Albuterol prn  -IV Solu-medrol  -Robitussin prn  -Tamiflu D#1    Chronic Diastolic CHF  -IV Lasix 40 mg once  -resumed PO Lasix  -daily weights; I&O's    CKD Stage 3a  -monitor labs    Depression, anxiety  Bipolar disorder  -continue home medication regimen    Allergic rhinitis  -cont Cetirizine    GERD  -cont Pepcid    Tobacco Dependence  -Recommended cessation  - nicotine patch ordered     DVT Prophylaxis: Lovenox 30 mg BID  Diet: Diet NPO Exceptions are: Ice Chips, Sips of Water with Meds  Code Status: Full Code    Ever [id number]  11/22/2022

## 2022-11-22 NOTE — ED PROVIDER NOTES
ATTENDING PHYSICIAN NOTE       Date of evaluation: 11/21/2022    Chief Complaint     Shortness of Breath (SOB x several hours. Pt denies wearing O2. States she uses an albuterol treatment every 6 hours but ran out of that several days ago. Is not flu or covid vaccinated. 76 % in triage on RA)      History of Present Illness     Perry Alexander is a 56 y.o. female who presents with shortness of breath. Symptoms have been ongoing for the last several hours. Reports shortness of breath is constant, worsens with exertion. Reports non productive cough. Denies any recent fevers, n/v, congestion, sore throat, vision changes. Reports baseline orthopnea and PND 2/2 CHF. Reports right sided chest pain that is constant and worsens with inspiration. Denies any abdominal pain, diarrhea, bloody stools, urinary changes, syncope, headache, neck stiffness, numbness or tingling or unilateral weakness. States she ran out of her albuterol a couple days ago. She is not on oxygen at baseline. States she has been compliant on her Lasix 40 mg daily. Denies any significant weight gain or edema. Patient is followed by pulmonology Dr. Ari Galvez for COPD. She is also followed by Chantal Sharpe with cardiology for diastolic congestive heart failure. Last EF was 55%. Review of Systems     Review of Systems   All other systems reviewed and are negative. Past Medical, Surgical, Family, and Social History     She has a past medical history of Allergic rhinitis, Anxiety, Asthma, Bipolar disorder, mixed ([address] COPD (chronic obstructive pulmonary disease) ([address] COPD (chronic obstructive pulmonary disease) ([address] Depression, Eczema, Pulmonary nodule, and Tobacco abuse. She has a past surgical history that includes bronchoscopy and Tonsillectomy. Her family history includes Diabetes in her mother; Hypertension in her father. She reports that she has been smoking cigarettes. She has a 68.00 pack-year smoking history.  She has never used smokeless tobacco. She reports that she does not drink alcohol and does not use drugs. Medications     Previous Medications    ALBUTEROL (PROVENTIL) (2.5 MG/3ML) 0.083% NEBULIZER SOLUTION    inhale contents of 1 vial (3ml) via nebulizer every 6 hours as needed for wheezing or shortness of breath    ASPIRIN LOW DOSE 81 MG EC TABLET    1 tablet    BUDESONIDE-FORMOTEROL (SYMBICORT) 160-4.5 MCG/ACT AERO    Inhale 2 puffs into the lungs 2 times daily    BUPROPION (WELLBUTRIN XL) 300 MG EXTENDED RELEASE TABLET        CHOLECALCIFEROL (VITAMIN D3) 1.25 MG (85058 UT) CAPS    Take by mouth once a week    FAMOTIDINE (PEPCID) 20 MG TABLET    TAKE 1 TABLET BY MOUTH TWO TIMES A DAY    FLUTICASONE (FLONASE) 50 MCG/ACT NASAL SPRAY    INHALE 2 SPRAYS IN EACH NOSTRIL ONE TIME A DAY    FUROSEMIDE (LASIX) 40 MG TABLET    Take 1 tablet by mouth daily    HYDROXYZINE (VISTARIL) 50 MG CAPSULE    Take 25 mg by mouth nightly    LORATADINE (CLARITIN) 10 MG TABLET    Take 1 tablet by mouth daily    MECLIZINE (ANTIVERT) 25 MG TABLET    Take 25 mg by mouth 3 times daily as needed PRN    OXYBUTYNIN (DITROPAN-XL) 5 MG EXTENDED RELEASE TABLET    TAKE 1 TABLET BY MOUTH ONE TIME A DAY    QUETIAPINE FUMARATE (SEROQUEL PO)    Take 25 mg by mouth daily    RISPERIDONE (RISPERDAL) 1 MG TABLET    1 tablet    TIOTROPIUM (SPIRIVA RESPIMAT) 2.5 MCG/ACT AERS INHALER    INHALE 2 PUFFS BY MOUTH EVERY DAY    TOPIRAMATE (TOPAMAX) 25 MG TABLET    Take 25 mg by mouth 2 times daily    VENTOLIN  (90 BASE) MCG/ACT INHALER    INHALE 2 PUFFS BY MOUTH EVERY SIX HOURS AS NEEDED FOR wheezing or SHORTNESS OF BREATH       Allergies     She is allergic to acetaminophen-codeine, darvocet [propoxyphene n-acetaminophen], eggs or egg-derived products, percocet [oxycodone-acetaminophen], shrimp flavor, and vicodin [hydrocodone-acetaminophen].     Physical Exam     INITIAL VITALS: BP: (!) 162/88, Temp: (!) 102.8 °F (39.3 °C), Heart Rate: 88, Resp: 19, SpO2: (!) 76 %   Physical Exam  Vitals and nursing note reviewed. Constitutional:       General: She is not in acute distress. Appearance: She is ill-appearing. She is not toxic-appearing. HENT:      Head: Normocephalic and atraumatic. Mouth/Throat:      Pharynx: Oropharynx is clear. No pharyngeal swelling or oropharyngeal exudate. Eyes:      Extraocular Movements: Extraocular movements intact. Neck:      Vascular: No JVD. Cardiovascular:      Rate and Rhythm: Normal rate and regular rhythm. No extrasystoles are present. Pulses: No decreased pulses. Heart sounds: No murmur heard. Pulmonary:      Effort: Tachypnea present. No respiratory distress. Breath sounds: Examination of the right-upper field reveals wheezing. Examination of the left-upper field reveals wheezing. Examination of the right-lower field reveals decreased breath sounds. Examination of the left-lower field reveals decreased breath sounds. Decreased breath sounds, wheezing and rhonchi present. Chest:      Chest wall: No tenderness or crepitus. Abdominal:      Palpations: Abdomen is soft. There is no mass. Tenderness: There is no abdominal tenderness. Musculoskeletal:         General: Normal range of motion. Cervical back: Normal range of motion and neck supple. Right lower leg: No edema. Left lower leg: No edema. Skin:     General: Skin is warm. Capillary Refill: Capillary refill takes less than 2 seconds. Coloration: Skin is not cyanotic or pale. Findings: No ecchymosis. Neurological:      General: No focal deficit present. Mental Status: She is alert. Cranial Nerves: No cranial nerve deficit. Motor: No weakness.    Psychiatric:         Mood and Affect: Mood normal.         Behavior: Behavior normal.       Diagnostic Results     EKG   I interpreted the EKG and note normal sinus rhythm, rightward axis deviation, normal QRS and QTc duration, n T wave inversions in anterolateral leads when compared to prior on March 14, 2021. RADIOLOGY:  CT CHEST PULMONARY EMBOLISM W CONTRAST   Final Result   1. No evidence of pulmonary embolism or acute pulmonary abnormality. 2.  Emphysema. Unchanged appearance of bilateral pulmonary scars dating back   to at least 2009. XR CHEST PORTABLE   Final Result   Unchanged appearance of the chest without acute airspace disease identified.              LABS:   Results for orders placed or performed during the hospital encounter of 11/21/22   COVID-19 & Influenza Combo    Specimen: Nasopharyngeal Swab   Result Value Ref Range    SARS-CoV-2 RNA, RT PCR NOT DETECTED NOT DETECTED    INFLUENZA A DETECTED (A) NOT DETECTED    INFLUENZA B NOT DETECTED NOT DETECTED   CBC with Auto Differential   Result Value Ref Range    WBC 7.0 4.0 - 11.0 K/uL    RBC 4.64 4.00 - 5.20 M/uL    Hemoglobin 15.3 12.0 - 16.0 g/dL    Hematocrit 45.8 36.0 - 48.0 %    MCV 98.8 80.0 - 100.0 fL    MCH 33.0 26.0 - 34.0 pg    MCHC 33.3 31.0 - 36.0 g/dL    RDW 14.7 12.4 - 15.4 %    Platelets 440 060 - 350 K/uL    MPV 9.7 5.0 - 10.5 fL    Neutrophils % 81.5 %    Lymphocytes % 4.9 %    Monocytes % 12.8 %    Eosinophils % 0.1 %    Basophils % 0.7 %    Neutrophils Absolute 5.7 1.7 - 7.7 K/uL    Lymphocytes Absolute 0.3 (L) 1.0 - 5.1 K/uL    Monocytes Absolute 0.9 0.0 - 1.3 K/uL    Eosinophils Absolute 0.0 0.0 - 0.6 K/uL    Basophils Absolute 0.0 0.0 - 0.2 K/uL   CMP w/ Reflex to MG   Result Value Ref Range    Sodium 132 (L) 136 - 145 mmol/L    Potassium reflex Magnesium 4.2 3.5 - 5.1 mmol/L    Chloride 94 (L) 99 - 110 mmol/L    CO2 29 21 - 32 mmol/L    Anion Gap 9 3 - 16    Glucose 118 (H) 70 - 99 mg/dL    BUN 18 7 - 20 mg/dL    Creatinine 1.2 (H) 0.6 - 1.1 mg/dL    Est, Glom Filt Rate 53 (A) >60    Calcium 9.4 8.3 - 10.6 mg/dL    Total Protein 7.0 6.4 - 8.2 g/dL    Albumin 4.0 3.4 - 5.0 g/dL    Albumin/Globulin Ratio 1.3 1.1 - 2.2    Total Bilirubin 0.5 0.0 - 1.0 mg/dL    Alkaline Phosphatase 68 40 - 129 U/L    ALT 12 10 - 40 U/L    AST 20 15 - 37 U/L   Lactic Acid   Result Value Ref Range    Lactic Acid 1.1 0.4 - 2.0 mmol/L   Troponin   Result Value Ref Range    Troponin <0.01 <0.01 ng/mL   Brain Natriuretic Peptide   Result Value Ref Range    Pro-BNP 22,665 (H) 0 - 124 pg/mL   Blood gas, venous   Result Value Ref Range    pH, Wilian 7.336 (L) 7.350 - 7.450    pCO2, Wilian 54.2 (H) 40.0 - 50.0 mmHg    pO2, Wilian 59.0 (H) 25.0 - 40.0 mmHg    HCO3, Venous 28.3 23.0 - 29.0 mmol/L    Base Excess, Wilian 1.2 -3.0 - 3.0 mmol/L    O2 Sat, Wilian 89 Not Established %    Carboxyhemoglobin 4.0 (H) 0.0 - 1.5 %    MetHgb, Wilian 0.0 <1.5 %    TC02 (Calc), Wilian 30 Not Established mmol/L    O2 Therapy Unknown        ED BEDSIDE ULTRASOUND:  No results found. RECENT VITALS:  BP: 105/68,Temp: 98.3 °F (36.8 °C), Heart Rate: 73, Resp: 22, SpO2: 97 %     Procedures         ED Course     Nursing Notes, Past Medical Hx, Past Surgical Hx, Social Hx,Allergies, and Family Hx were reviewed. ED Course as of 11/22/22 0447   Mon Nov 21, 2022 2329 INFLUENZA A(!): DETECTED [JK]   2329 SpO2(!): 76 % [JK]      ED Course User Index  [JK] Radha Juarez MD       patient was given the following medications:  Orders Placed This Encounter   Medications    acetaminophen (TYLENOL) tablet 650 mg    ipratropium-albuterol (DUONEB) nebulizer solution 1 ampule     Order Specific Question:   Initiate RT Bronchodilator Protocol     Answer:   Yes - Inpatient Protocol    furosemide (LASIX) injection 40 mg    ipratropium-albuterol (DUONEB) nebulizer solution 1 ampule     Order Specific Question:   Initiate RT Bronchodilator Protocol     Answer:   Yes - ED protocol    LORazepam (ATIVAN) injection 1 mg    iopamidol (ISOVUE-370) 76 % injection 85 mL       CONSULTS:  None    MEDICAL DECISIONMAKING / ASSESSMENT / Benja Regine is a 56 y.o. female who presents with shortness of breath.  She presented hypertensive, febrile, HR 88, hypoxic initially on RA to 76%. She was placed on 2L supplemental with improved saturations. Given history and exam my differential diagnosis includes but is not limited to ACS, COPD exacerbation, CHF, pneumonia, COVID 23, influenza, pericarditis, PTX, PE. She has no abdominal tenderness to suggest acute intra-abdominal infection. She has no urinary symptoms to suggest UTI or pyelonephritis. I obtained labs and imaging studies as noted below. I interpreted the labs and note  CBC with normal white blood cell count, no evidence of anemia, normal platelets  CMP with hyponatremia to 132, normal BUN with an elevated creatinine to 1.2 with GFR 53 which appears to be stable, hyperglycemic to 118, normal LFTs and bilirubin  Elevated proBNP to 22,665  Negative troponin at less than 0.01  Influenza A positive  COVID-19 negative    I interpreted the imaging and note  Chest x-ray with no acute airspace disease  CT pulmonary embolism study negative for PE. Emphysema is present. Attempted to wean patient off supplemental oxygen however she continued to be hypoxic. Presentation appears secondary to underlying influenza A as well as acute on chronic CHF exacerbation. No evidence of pneumonia at this time. CT negative for PE. She does not appear to be septic. Given new oxygen requirement I decided to admit patient for further work-up and treatment. Patient is amenable with plan. Patient accepted by Dr. Sharad Lyon. Critical Care: I spent 22 minutes providing critical care. This time excludes time spent performing procedures but includes time spent on direct patient care, history retrieval, review of the chart, and discussions with patient, family, and consultant(s). Clinical Impression     1. Hypoxia    2. Influenza A    3. Congestive heart failure, unspecified HF chronicity, unspecified heart failure type ([facility] [address]    4. Hyponatremia        Disposition     PATIENT REFERRED TO:  No follow-up provider specified.     DISCHARGE MEDICATIONS:  New Prescriptions    No medications on file       DISPOSITION Decision To Admit 11/22/2022 01:13:24 AM          Kady Soriano MD  11/22/22 3680

## 2022-11-23 LAB
ALBUMIN SERPL-MCNC: 3.4 G/DL (ref 3.4–5)
ANION GAP SERPL CALCULATED.3IONS-SCNC: 6 MMOL/L (ref 3–16)
BUN BLDV-MCNC: 21 MG/DL (ref 7–20)
CALCIUM SERPL-MCNC: 9.3 MG/DL (ref 8.3–10.6)
CHLORIDE BLD-SCNC: 97 MMOL/L (ref 99–110)
CO2: 35 MMOL/L (ref 21–32)
CREAT SERPL-MCNC: 1 MG/DL (ref 0.6–1.1)
GFR SERPL CREATININE-BSD FRML MDRD: >60 ML/MIN/{1.73_M2}
GLUCOSE BLD-MCNC: 112 MG/DL (ref 70–99)
HCT VFR BLD CALC: 46.6 % (ref 36–48)
HEMOGLOBIN: 15.8 G/DL (ref 12–16)
MCH RBC QN AUTO: 33.5 PG (ref 26–34)
MCHC RBC AUTO-ENTMCNC: 33.9 G/DL (ref 31–36)
MCV RBC AUTO: 98.8 FL (ref 80–100)
PDW BLD-RTO: 14.9 % (ref 12.4–15.4)
PHOSPHORUS: 3 MG/DL (ref 2.5–4.9)
PLATELET # BLD: 125 K/UL (ref 135–450)
PMV BLD AUTO: 10.2 FL (ref 5–10.5)
POTASSIUM SERPL-SCNC: 4.8 MMOL/L (ref 3.5–5.1)
RBC # BLD: 4.71 M/UL (ref 4–5.2)
SODIUM BLD-SCNC: 138 MMOL/L (ref 136–145)
WBC # BLD: 4.2 K/UL (ref 4–11)

## 2022-11-23 PROCEDURE — 6360000002 HC RX W HCPCS: Performed by: INTERNAL MEDICINE

## 2022-11-23 PROCEDURE — 6370000000 HC RX 637 (ALT 250 FOR IP): Performed by: INTERNAL MEDICINE

## 2022-11-23 PROCEDURE — 99233 SBSQ HOSP IP/OBS HIGH 50: CPT | Performed by: INTERNAL MEDICINE

## 2022-11-23 PROCEDURE — 2700000000 HC OXYGEN THERAPY PER DAY

## 2022-11-23 PROCEDURE — 2580000003 HC RX 258: Performed by: INTERNAL MEDICINE

## 2022-11-23 PROCEDURE — 2060000000 HC ICU INTERMEDIATE R&B

## 2022-11-23 PROCEDURE — 85027 COMPLETE CBC AUTOMATED: CPT

## 2022-11-23 PROCEDURE — 6370000000 HC RX 637 (ALT 250 FOR IP): Performed by: NURSE PRACTITIONER

## 2022-11-23 PROCEDURE — 94640 AIRWAY INHALATION TREATMENT: CPT

## 2022-11-23 PROCEDURE — 36415 COLL VENOUS BLD VENIPUNCTURE: CPT

## 2022-11-23 PROCEDURE — 94761 N-INVAS EAR/PLS OXIMETRY MLT: CPT

## 2022-11-23 PROCEDURE — 94660 CPAP INITIATION&MGMT: CPT

## 2022-11-23 PROCEDURE — 80069 RENAL FUNCTION PANEL: CPT

## 2022-11-23 RX ADMIN — ENOXAPARIN SODIUM 30 MG: 100 INJECTION SUBCUTANEOUS at 08:57

## 2022-11-23 RX ADMIN — MUPIROCIN: 20 OINTMENT TOPICAL at 20:30

## 2022-11-23 RX ADMIN — FUROSEMIDE 40 MG: 40 TABLET ORAL at 08:53

## 2022-11-23 RX ADMIN — METHYLPREDNISOLONE SODIUM SUCCINATE 40 MG: 40 INJECTION, POWDER, FOR SOLUTION INTRAMUSCULAR; INTRAVENOUS at 17:39

## 2022-11-23 RX ADMIN — FAMOTIDINE 20 MG: 20 TABLET ORAL at 20:30

## 2022-11-23 RX ADMIN — CETIRIZINE HYDROCHLORIDE 5 MG: 10 TABLET ORAL at 08:53

## 2022-11-23 RX ADMIN — BUPROPION HYDROCHLORIDE 300 MG: 150 TABLET, EXTENDED RELEASE ORAL at 09:20

## 2022-11-23 RX ADMIN — TOPIRAMATE 25 MG: 25 TABLET, FILM COATED ORAL at 08:53

## 2022-11-23 RX ADMIN — Medication 10 ML: at 08:58

## 2022-11-23 RX ADMIN — OSELTAMIVIR PHOSPHATE 75 MG: 75 CAPSULE ORAL at 20:29

## 2022-11-23 RX ADMIN — FAMOTIDINE 20 MG: 20 TABLET ORAL at 08:53

## 2022-11-23 RX ADMIN — ENOXAPARIN SODIUM 30 MG: 100 INJECTION SUBCUTANEOUS at 20:30

## 2022-11-23 RX ADMIN — Medication 10 ML: at 20:30

## 2022-11-23 RX ADMIN — IPRATROPIUM BROMIDE AND ALBUTEROL SULFATE 1 AMPULE: 2.5; .5 SOLUTION RESPIRATORY (INHALATION) at 07:21

## 2022-11-23 RX ADMIN — OSELTAMIVIR PHOSPHATE 75 MG: 75 CAPSULE ORAL at 08:53

## 2022-11-23 RX ADMIN — MUPIROCIN: 20 OINTMENT TOPICAL at 08:53

## 2022-11-23 RX ADMIN — IPRATROPIUM BROMIDE AND ALBUTEROL SULFATE 1 AMPULE: 2.5; .5 SOLUTION RESPIRATORY (INHALATION) at 23:22

## 2022-11-23 RX ADMIN — HYDROXYZINE HYDROCHLORIDE 25 MG: 25 TABLET ORAL at 20:29

## 2022-11-23 RX ADMIN — IPRATROPIUM BROMIDE AND ALBUTEROL SULFATE 1 AMPULE: 2.5; .5 SOLUTION RESPIRATORY (INHALATION) at 11:19

## 2022-11-23 RX ADMIN — RISPERIDONE 1 MG: 1 TABLET ORAL at 20:30

## 2022-11-23 RX ADMIN — IPRATROPIUM BROMIDE AND ALBUTEROL SULFATE 1 AMPULE: 2.5; .5 SOLUTION RESPIRATORY (INHALATION) at 15:05

## 2022-11-23 RX ADMIN — METHYLPREDNISOLONE SODIUM SUCCINATE 40 MG: 40 INJECTION, POWDER, FOR SOLUTION INTRAMUSCULAR; INTRAVENOUS at 05:02

## 2022-11-23 RX ADMIN — IPRATROPIUM BROMIDE AND ALBUTEROL SULFATE 1 AMPULE: 2.5; .5 SOLUTION RESPIRATORY (INHALATION) at 18:56

## 2022-11-23 RX ADMIN — ASPIRIN 81 MG: 81 TABLET, COATED ORAL at 08:53

## 2022-11-23 RX ADMIN — TOPIRAMATE 25 MG: 25 TABLET, FILM COATED ORAL at 20:30

## 2022-11-23 ASSESSMENT — PAIN SCALES - GENERAL: PAINLEVEL_OUTOF10: 0

## 2022-11-23 NOTE — PROGRESS NOTES
Internal Medicine ICU Progress Note      Events of Last 24 hours:     Admitted to ICU on Bipap. Has COPD. She is a former smoker. No chest pain. Improved this am. She does not use oxygen at home. She was on 7 L this am when I saw her. Feeling better overall. No fever or chills. Has not been vaccinated against flu. Tested positive for Influenza A. Invasive Lines: PIV        MV:  n/a    Recent Labs     22  0927 22  1251   PHART 7.229* 7.364   [id number] 75.4* 55.6*   PO2ART 62.9* 126.5*       MV Settings:     / / /FiO2 : 40 %    IV:   sodium chloride         Vitals:  Temp  Av.9 °F (36.6 °C)  Min: 97.3 °F (36.3 °C)  Max: 98.2 °F (36.8 °C)  Pulse  Av.5  Min: 57  Max: 89  BP  Min: 85/66  Max: 152/121  SpO2  Av.9 %  Min: 89 %  Max: 100 %  FiO2   Av %  Min: 40 %  Max: 45 %  Patient Vitals for the past 4 hrs:   Pulse Resp SpO2 Weight   22 0723 64 23 (!) 89 % --   22 0527 -- -- -- 258 lb 2.5 oz (117.1 kg)       CVP:        Intake/Output Summary (Last 24 hours) at 2022 0815  Last data filed at 2022 0648  Gross per 24 hour   Intake 47.46 ml   Output 1400 ml   Net -1352.54 ml       EXAM:  General: No distress. Alert. Eyes: PERRL. No sclera icterus. No conjunctiva injected. ENT: No discharge. Pharynx clear. Neck: Trachea midline. Normal thyroid. Resp: No accessory muscle use. No crackles. + wheezing. No rhonchi. No dullness on percussion. CV: Regular rate. Regular rhythm. No mumur or rub. Trace edema. No JVD. Palpable pedal pulses. GI: Non-tender. Non-distended. No masses. No organmegaly. Normal bowel sounds. No hernia. Skin: Warm and dry. No nodule on exposed extremities. No rash on exposed extremities. Lymph: No cervical LAD. No supraclavicular LAD. M/S: No cyanosis. No joint deformity. No clubbing. Neuro: Awake. Follows commands. Positive pupils/gag/corneals. Normal pain response. Psych: Oriented to person, place, time. No anxiety or agitation. Medications:  Scheduled Meds:   buPROPion  300 mg Oral Daily    aspirin  81 mg Oral Daily    oseltamivir  75 mg Oral BID    famotidine  20 mg Oral BID    risperiDONE  1 mg Oral Nightly    cetirizine  5 mg Oral Daily    hydrOXYzine HCl  25 mg Oral Nightly    topiramate  25 mg Oral BID    sodium chloride flush  5-40 mL IntraVENous 2 times per day    enoxaparin  30 mg SubCUTAneous BID    nicotine  1 patch TransDERmal Daily    methylPREDNISolone  40 mg IntraVENous Q12H    Followed by    Suellyn Frankel ON 11/24/2022] predniSONE  40 mg Oral Daily    furosemide  40 mg Oral Daily    ipratropium-albuterol  1 ampule Inhalation Q4H WA    mupirocin   Nasal BID       PRN Meds:  guaiFENesin-dextromethorphan, sodium chloride flush, sodium chloride, ondansetron **OR** ondansetron, polyethylene glycol, acetaminophen **OR** acetaminophen, meclizine, albuterol    Results:  CBC:   Recent Labs     11/22/22  0032 11/22/22  0620 11/23/22  0453   WBC 7.0 8.0 4.2   HGB 15.3 16.5* 15.8   HCT 45.8 49.7* 46.6   MCV 98.8 100.7* 98.8    138 125*     BMP:   Recent Labs     11/22/22 0032 11/22/22  0827 11/23/22  0453   * 135* 138   K 4.2 5.0 4.8   CL 94* 95* 97*   CO2 29 27 35*   PHOS  --   --  3.0   BUN 18 18 21*   CREATININE 1.2* 1.2* 1.0     LIVER PROFILE:   Recent Labs     11/22/22 0032 11/22/22  0827   AST 20 35   ALT 12 15   BILITOT 0.5 0.3   ALKPHOS 68 72         Cultures:  COVID 19 neg  Influenza A detected    Films:    CT CHEST PULMONARY EMBOLISM W CONTRAST   Final Result   1. No evidence of pulmonary embolism or acute pulmonary abnormality. 2.  Emphysema. Unchanged appearance of bilateral pulmonary scars dating back   to at least 2009. XR CHEST PORTABLE   Final Result   Unchanged appearance of the chest without acute airspace disease identified.                 Assessment:    Principal Problem:    Acute respiratory failure with hypoxia and hypercarbia (HCC)  Active Problems:    Hypoxia    Influenza A    Chronic diastolic CHF (congestive heart failure) (HCC)    COPD exacerbation (HCC)  Resolved Problems:    * No resolved hospital problems. *       Plan:    Acute hypoxic/hypercapnic respiratory failure  Influenza A  COPD exacerbation  -admitted for further management/care  -pulmonology consulted  -ordered ABG  -Got Rocephin for one day and stopped. -Duonebs scheduled, Albuterol prn  -IV Solu-medrol  -Robitussin prn  -Tamiflu D#2     Chronic Diastolic CHF  -IV Lasix 40 mg once  -resumed PO Lasix  -daily weights; I&O's     CKD Stage 3a  -monitor labs     Depression, anxiety  Bipolar disorder  -continue home medication regimen     Allergic rhinitis  -cont Cetirizine    GERD  -cont Pepcid     She has quit smoking     Morbid obesity  - weight loss recommended. DVT Prophylaxis: Lovenox 30 mg BID  Diet: Reg diet. Code Status: Full Code      Transfer to PCU. All questions and concerns were addressed to the patient/family. Alternatives to my treatment were discussed. The note was completed using EMR. Every effort was made to ensure accuracy; however, inadvertent computerized transcription errors may be present.          Noreen Rodriguez MD 8:15 AM 11/23/2022

## 2022-11-23 NOTE — PROGRESS NOTES
4 Eyes Skin Assessment     The patient is being assess for   Shift Handoff    I agree that 2 RN's have performed a thorough Head to Toe Skin Assessment on the patient. ALL assessment sites listed below have been assessed. Areas assessed for pressure by both nurses:   [x]   Head, Face, and Ears   [x]   Shoulders, Back, and Chest, Abdomen  [x]   Arms, Elbows, and Hands   [x]   Coccyx, Sacrum, and Ischium  [x]   Legs, Feet, and Heels        Skin Assessed Under all Medical Devices by both nurses:  O2 device tubing              All Mepilex Borders were peeled back and area peeked at by both nurses:  No: NA  Please list where Mepilex Borders are located:               **SHARE this note so that the co-signing nurse is able to place an eSignature**    Co-signer eSignature: Electronically signed by Dianne Lua RN on 11/23/22 at 5:58 PM EST    Does the Patient have Skin Breakdown related to pressure?   No     J Luis Prevention initiated:  Yes   Wound Care Orders initiated:  No      [facility] nurse consulted for Pressure Injury (Stage 3,4, Unstageable, DTI, NWPT, Complex wounds)and New or Established Ostomies:  No      Primary Nurse eSignature: Electronically signed by Caleb Ingram RN on 11/23/22 at 5:26 PM EST

## 2022-11-23 NOTE — PLAN OF CARE
Problem: Discharge Planning  Goal: Discharge to home or other facility with appropriate resources  Outcome: Progressing  Flowsheets (Taken 11/22/2022 2000)  Discharge to home or other facility with appropriate resources: Identify barriers to discharge with patient and caregiver     Problem: Skin/Tissue Integrity  Goal: Absence of new skin breakdown  Description: 1. Monitor for areas of redness and/or skin breakdown  2. Assess vascular access sites hourly  3. Every 4-6 hours minimum:  Change oxygen saturation probe site  4. Every 4-6 hours:  If on nasal continuous positive airway pressure, respiratory therapy assess nares and determine need for appliance change or resting period.   Outcome: Progressing     Problem: Respiratory - Adult  Goal: Achieves optimal ventilation and oxygenation  Outcome: Progressing     Problem: Metabolic/Fluid and Electrolytes - Adult  Goal: Electrolytes maintained within normal limits  Outcome: Progressing  Goal: Hemodynamic stability and optimal renal function maintained  Outcome: Progressing

## 2022-11-23 NOTE — PROGRESS NOTES
Pulmonary Progress Note  CC: Respiratory failure, Influenza A    Subjective:  Used Bipap most of the day yesterday and overnight. Now on 4L nc. EXAM: /64   Pulse 64   Temp 97.3 °F (36.3 °C) (Axillary)   Resp 23   Ht 5' 7\" (1.702 m)   Wt 258 lb 2.5 oz (117.1 kg)   LMP 07/04/2016   SpO2 (!) 89%   BMI 40.43 kg/m²  on 4L  Constitutional:  No acute distress. Eyes: PERRL. Conjunctivae anicteric. ENT: Normal nose. Normal tongue. Neck:  Trachea is midline. No thyroid tenderness. Respiratory: No accessory muscle usage. No wheezes. No rales. No Rhonchi. Cardiovascular: Normal S1S2. No digit clubbing. No digit cyanosis. No LE edema. Psychiatric: No anxiety or Agitation. Alert and Oriented to person, place and time.     Scheduled Meds:   buPROPion  300 mg Oral Daily    aspirin  81 mg Oral Daily    oseltamivir  75 mg Oral BID    famotidine  20 mg Oral BID    risperiDONE  1 mg Oral Nightly    cetirizine  5 mg Oral Daily    hydrOXYzine HCl  25 mg Oral Nightly    topiramate  25 mg Oral BID    sodium chloride flush  5-40 mL IntraVENous 2 times per day    enoxaparin  30 mg SubCUTAneous BID    nicotine  1 patch TransDERmal Daily    methylPREDNISolone  40 mg IntraVENous Q12H    Followed by    Arlin Watkins ON 11/24/2022] predniSONE  40 mg Oral Daily    furosemide  40 mg Oral Daily    ipratropium-albuterol  1 ampule Inhalation Q4H WA    mupirocin   Nasal BID     Continuous Infusions:   sodium chloride       PRN Meds:  guaiFENesin-dextromethorphan, sodium chloride flush, sodium chloride, ondansetron **OR** ondansetron, polyethylene glycol, acetaminophen **OR** acetaminophen, meclizine, albuterol    Labs:  CBC:   Recent Labs     11/22/22  0032 11/22/22  0620 11/23/22  0453   WBC 7.0 8.0 4.2   HGB 15.3 16.5* 15.8   HCT 45.8 49.7* 46.6   MCV 98.8 100.7* 98.8    138 125*     BMP:   Recent Labs     11/22/22  0032 11/22/22  0827 11/23/22  0453   * 135* 138   K 4.2 5.0 4.8   CL 94* 95* 97*   CO2 29 27 35*   PHOS --   --  3.0   BUN 18 18 21*   CREATININE 1.2* 1.2* 1.0   Chest imaging was reviewed by me and showed 11/22/22 CTPA: no PE  1. No evidence of pulmonary embolism or acute pulmonary abnormality. 2.  Emphysema. Unchanged appearance of bilateral pulmonary scars dating back   to at least 2009. ASSESSMENT:  Acute Hypercarbic and hypoxic respiratory failure   Influenza A  COPD and Asthma with acute exacerbation  Chronic diastolic CHF  CKD     PLAN:  Noninvasive positive pressure ventilation per my orders  Supplemental oxygen to maintain SaO2 >92%; wean as tolerated  Intensive inhaled bronchodilator therapy. IV solumedrol 40 mg IV Q12 hrs.    Tamiflu D#2  Stop IVF  Acapella QID to mobilize respiratory secretions  Ok to transfer

## 2022-11-23 NOTE — PROGRESS NOTES
11/22/22 2245   NIV Type   Mode Bilevel   Mask Type Full face mask   Mask Size Large   Settings/Measurements   IPAP 15 cmH20   CPAP/EPAP 5 cmH2O   Vt (Measured) 445 mL   Rate Ordered 14   Resp 28   FiO2  40 %   Comfort Level Good   SpO2 94

## 2022-11-23 NOTE — PROGRESS NOTES
Patient is asleep,calm and on oxygen at 3 L  and is saturating well between 94-98%  Call light is within reach

## 2022-11-23 NOTE — ACP (ADVANCE CARE PLANNING)
Advance Care Planning     General Advance Care Planning (ACP) Conversation    Date of Conversation: 11/21/2022  Conducted with: Patient with Decision Making Capacity    Healthcare Decision Maker:    Primary Decision Maker: Sue Estrada [facility] - [phone]  Click here to complete Healthcare Decision Makers including selection of the Healthcare Decision Maker Relationship (ie \"Primary\"). Today we documented Decision Maker(s) consistent with Legal Next of Kin hierarchy.  Pt stated he is her next of kin    Content/Action Overview:    Reviewed DNR/DNI and patient elects Full Code (Attempt Resuscitation)  ventilation preferences  Pt stated she would want a vent if medically necessary     Length of Voluntary ACP Conversation in minutes:  <16 minutes (Non-Billable)    Julia Oscar MSW, LESLEE-S

## 2022-11-23 NOTE — PROGRESS NOTES
4 Eyes Skin Assessment     The patient is being assess for   Shift Handoff    I agree that 2 RN's have performed a thorough Head to Toe Skin Assessment on the patient. ALL assessment sites listed below have been assessed. Areas assessed for pressure by both nurses:   [x]   Head, Face, and Ears   [x]   Shoulders, Back, and Chest, Abdomen  [x]   Arms, Elbows, and Hands   [x]   Coccyx, Sacrum, and Ischium  [x]   Legs, Feet, and Heels        Skin Assessed Under all Medical Devices by both nurses:  O2 device tubing and Bipap mask              All Mepilex Borders were peeled back and area peeked at by both nurses:  No: N/A  Please list where Mepilex Borders are located:  N/A             **SHARE this note so that the co-signing nurse is able to place an eSignature**    Co-signer eSignature: Electronically signed by Luis Aguilar RN on 11/23/22 at 1:56 AM EST    Does the Patient have Skin Breakdown related to pressure?   No            J Luis Prevention initiated:  Yes   Wound Care Orders initiated:  No      [facility] nurse consulted for Pressure Injury (Stage 3,4, Unstageable, DTI, NWPT, Complex wounds)and New or Established Ostomies:  No      Primary Nurse eSignature: Electronically signed by Heather Ocampo RN on 11/23/22 at 1:39 AM EST

## 2022-11-23 NOTE — PROGRESS NOTES
RT Nebulizer Bronchodilator Protocol Note    There is a bronchodilator order in the chart from a provider indicating to follow the RT Bronchodilator Protocol and there is an Initiate RT Bronchodilator Protocol order as well (see protocol at bottom of note). CXR Findings:  XR CHEST PORTABLE    Result Date: 11/21/2022  Unchanged appearance of the chest without acute airspace disease identified. The findings from the last RT Protocol Assessment were as follows:  Smoking: Chronic pulmonary disease  Respiratory Pattern: Dyspnea on exertion or RR 21-25 bpm  Breath Sounds: Slightly diminished and/or crackles  Cough: Strong, spontaneous, non-productive  Indication for Bronchodilator Therapy: Wheezing associated with pulm disorder  Bronchodilator Assessment Score: 6    Aerosolized bronchodilator medication orders have been revised according to the RT Nebulizer Bronchodilator Protocol below. Respiratory Therapist to perform RT Therapy Protocol Assessment initially then follow the protocol. Repeat RT Therapy Protocol Assessment PRN for score 0-3 or on second treatment, BID, and PRN for scores above 3. No Indications - adjust the frequency to every 6 hours PRN wheezing or bronchospasm, if no treatments needed after 48 hours then discontinue using Per Protocol order mode. If indication present, adjust the RT bronchodilator orders based on the Bronchodilator Assessment Score as indicated below. If a patient is on this medication at home then do not decrease Frequency below that used at home. 0-3 - enter or revise RT bronchodilator order(s) to equivalent RT Bronchodilator order with Frequency of every 4 hours PRN for wheezing or increased work of breathing using Per Protocol order mode.        4-6 - enter or revise RT Bronchodilator order(s) to two equivalent RT bronchodilator orders with one order with BID Frequency and one order with Frequency of every 4 hours PRN wheezing or increased work of breathing using Per Protocol order mode. 7-10 - enter or revise RT Bronchodilator order(s) to two equivalent RT bronchodilator orders with one order with TID Frequency and one order with Frequency of every 4 hours PRN wheezing or increased work of breathing using Per Protocol order mode. 11-13 - enter or revise RT Bronchodilator order(s) to one equivalent RT bronchodilator order with QID Frequency and an Albuterol order with Frequency of every 4 hours PRN wheezing or increased work of breathing using Per Protocol order mode. Greater than 13 - enter or revise RT Bronchodilator order(s) to one equivalent RT bronchodilator order with every 4 hours Frequency and an Albuterol order with Frequency of every 2 hours PRN wheezing or increased work of breathing using Per Protocol order mode. RT to enter RT Home Evaluation for COPD & MDI Assessment order using Per Protocol order mode.     Electronically signed by Phoenix Bland RCP on 11/23/2022 at 7:26 AM

## 2022-11-23 NOTE — PLAN OF CARE
Problem: Discharge Planning  Goal: Discharge to home or other facility with appropriate resources  Outcome: Progressing     Problem: Skin/Tissue Integrity  Goal: Absence of new skin breakdown  Description: 1. Monitor for areas of redness and/or skin breakdown  2. Assess vascular access sites hourly  3. Every 4-6 hours minimum:  Change oxygen saturation probe site  4. Every 4-6 hours:  If on nasal continuous positive airway pressure, respiratory therapy assess nares and determine need for appliance change or resting period.   Outcome: Progressing     Problem: Respiratory - Adult  Goal: Achieves optimal ventilation and oxygenation  Outcome: Progressing     Problem: Metabolic/Fluid and Electrolytes - Adult  Goal: Electrolytes maintained within normal limits  Outcome: Progressing     Problem: Metabolic/Fluid and Electrolytes - Adult  Goal: Hemodynamic stability and optimal renal function maintained  Outcome: Progressing     Problem: Chronic Conditions and Co-morbidities  Goal: Patient's chronic conditions and co-morbidity symptoms are monitored and maintained or improved  Outcome: Progressing

## 2022-11-23 NOTE — PROGRESS NOTES
RT Inhaler-Nebulizer Bronchodilator Protocol Note    There is a bronchodilator order in the chart from a provider indicating to follow the RT Bronchodilator Protocol and there is an Initiate RT Inhaler-Nebulizer Bronchodilator Protocol order as well (see protocol at bottom of note). CXR Findings:  XR CHEST PORTABLE    Result Date: 11/21/2022  Unchanged appearance of the chest without acute airspace disease identified. The findings from the last RT Protocol Assessment were as follows:   History Pulmonary Disease: (P) Chronic pulmonary disease  Respiratory Pattern: (P) Mild dyspnea at rest, irregular pattern, or RR 21-25 bpm  Breath Sounds: (P) Inspiratory and expiratory or bilateral wheezing and/or rhonchi  Cough: (P) Strong, spontaneous, non-productive  Indication for Bronchodilator Therapy: (P) Wheezing associated with pulm disorder  Bronchodilator Assessment Score: (P) 12    Aerosolized bronchodilator medication orders have been revised according to the RT Inhaler-Nebulizer Bronchodilator Protocol below. Respiratory Therapist to perform RT Therapy Protocol Assessment initially then follow the protocol. Repeat RT Therapy Protocol Assessment PRN for score 0-3 or on second treatment, BID, and PRN for scores above 3. No Indications - adjust the frequency to every 6 hours PRN wheezing or bronchospasm, if no treatments needed after 48 hours then discontinue using Per Protocol order mode. If indication present, adjust the RT bronchodilator orders based on the Bronchodilator Assessment Score as indicated below. Use Inhaler orders unless patient has one or more of the following: on home nebulizer, not able to hold breath for 10 seconds, is not alert and oriented, cannot activate and use MDI correctly, or respiratory rate 25 breaths per minute or more, then use the equivalent nebulizer order(s) with same Frequency and PRN reasons based on the score.   If a patient is on this medication at home then do not decrease Frequency below that used at home. 0-3 - enter or revise RT bronchodilator order(s) to equivalent RT Bronchodilator order with Frequency of every 4 hours PRN for wheezing or increased work of breathing using Per Protocol order mode. 4-6 - enter or revise RT Bronchodilator order(s) to two equivalent RT bronchodilator orders with one order with BID Frequency and one order with Frequency of every 4 hours PRN wheezing or increased work of breathing using Per Protocol order mode. 7-10 - enter or revise RT Bronchodilator order(s) to two equivalent RT bronchodilator orders with one order with TID Frequency and one order with Frequency of every 4 hours PRN wheezing or increased work of breathing using Per Protocol order mode. 11-13 - enter or revise RT Bronchodilator order(s) to one equivalent RT bronchodilator order with QID Frequency and an Albuterol order with Frequency of every 4 hours PRN wheezing or increased work of breathing using Per Protocol order mode. Greater than 13 - enter or revise RT Bronchodilator order(s) to one equivalent RT bronchodilator order with every 4 hours Frequency and an Albuterol order with Frequency of every 2 hours PRN wheezing or increased work of breathing using Per Protocol order mode.        Electronically signed by Linette Saldaña RCP on 11/22/2022 at 7:07 PM

## 2022-11-23 NOTE — CARE COORDINATION
Case Management Assessment  Initial Evaluation      Patient Name: Abigail Zamorano  YOB: 1965  Diagnosis: Hyponatremia [E87.1]  Influenza A [J10.1]  Hypoxia [R09.02]  Acute respiratory failure with hypoxia and hypercapnia (HCC) [J96.01, J96.02]  Congestive heart failure, unspecified HF chronicity, unspecified heart failure type [facility]) [I50.9]  Date / Time: 11/21/2022 10:31 PM    Admission status/Date:11/21/2022 Inpatient   Chart Reviewed: Yes      Patient Interviewed: Yes   Family Interviewed:  No      Hospitalization in the last 30 days:  No    Health Care Decision Maker :   Primary Decision Maker: Sarah 1 - [facility] - [phone]    (CM - must 1st enter selection under Navigator - emergency contact- [place] Relationship and pick relationship)   Who do you trust or have selected to make healthcare decisions for you    Met with: pt   Interview conducted  (bedside/phone):bedside    Current PCP: Catherine Phoenix, RN, NP    Financial  Estrella Styles 3. required for SNF : Y          3 night stay required -  [employer]  Support Systems/Care Needs:    Transportation: self    Meal Preparation: self    Housing  Living Arrangements: pt lives at home with her family and boyfriend  Steps: 1 to the kitchen door  Intent for return to present living arrangements: Yes  Identified Issues: [id number] [facility] with [address] : No Agency:(Services)     Passport/Waiver : No  :                      Phone Number:    Passport/Waiver Services: n/a          Durable Medical Equiptment   DME Provider: n/a  Equipment:   Walker___Cane___RTS___ BSC___Shower Chair___Hospital Bed___W/C__x at grocery stores__Other________  02 at ____Liter(s)---wears(frequency)_______ [facility] ___ CPAP___ BiPap___   N/A____    Home O2 Use :  No    If No for home O2---if presently on O2 during hospitalization:  Yes  if yes CM to follow for potential DC O2 need    Community Service Affiliation  Dialysis:  No    Agency:  Location:  Dialysis Schedule:  Phone:   Fax: Other Community Services: [facility] [place] [facility]  is [facility]. Per pt request, called and spoke with Aaliyah Cavanaugh at [phone] to make him aware that pt is in the hospital for the flu. Aaliyah Cavanaugh stated that pt already made him aware of this and they have an appt scheduled for 12/6 as this is needed before pt sees the psychiatrist.     DISCHARGE PLAN: Explained Case Management role/services. Chart review completed. Met with pt at bedside. Pt stated she is independent at home with her ADL's and will return when discharged. Discussed skilled home care and she denied needing this for RN/PT/OT. She states if she needs home o2, she would like to use [facility] as she gets her incontinence supplies through them. She is aware they may not provide o2 in the area and she stated understanding. CM will follow. Please notify CM if needs or concerns arise.      Lavinia Vasquez MSW, LESLEE-S

## 2022-11-23 NOTE — PROGRESS NOTES
RT Inhaler-Nebulizer Bronchodilator Protocol Note    There is a bronchodilator order in the chart from a provider indicating to follow the RT Bronchodilator Protocol and there is an Initiate RT Inhaler-Nebulizer Bronchodilator Protocol order as well (see protocol at bottom of note). CXR Findings:  XR CHEST PORTABLE    Result Date: 11/21/2022  Unchanged appearance of the chest without acute airspace disease identified. The findings from the last RT Protocol Assessment were as follows:   History Pulmonary Disease: (P) Chronic pulmonary disease  Respiratory Pattern: (P) Dyspnea on exertion or RR 21-25 bpm  Breath Sounds: (P) Intermittent or unilateral wheezes  Cough: (P) Strong, spontaneous, non-productive  Indication for Bronchodilator Therapy: (P) Wheezing associated with pulm disorder, On home bronchodilators  Bronchodilator Assessment Score: (P) 8    Aerosolized bronchodilator medication orders have been revised according to the RT Inhaler-Nebulizer Bronchodilator Protocol below. Respiratory Therapist to perform RT Therapy Protocol Assessment initially then follow the protocol. Repeat RT Therapy Protocol Assessment PRN for score 0-3 or on second treatment, BID, and PRN for scores above 3. No Indications - adjust the frequency to every 6 hours PRN wheezing or bronchospasm, if no treatments needed after 48 hours then discontinue using Per Protocol order mode. If indication present, adjust the RT bronchodilator orders based on the Bronchodilator Assessment Score as indicated below. Use Inhaler orders unless patient has one or more of the following: on home nebulizer, not able to hold breath for 10 seconds, is not alert and oriented, cannot activate and use MDI correctly, or respiratory rate 25 breaths per minute or more, then use the equivalent nebulizer order(s) with same Frequency and PRN reasons based on the score.   If a patient is on this medication at home then do not decrease Frequency below that used at home. 0-3 - enter or revise RT bronchodilator order(s) to equivalent RT Bronchodilator order with Frequency of every 4 hours PRN for wheezing or increased work of breathing using Per Protocol order mode. 4-6 - enter or revise RT Bronchodilator order(s) to two equivalent RT bronchodilator orders with one order with BID Frequency and one order with Frequency of every 4 hours PRN wheezing or increased work of breathing using Per Protocol order mode. 7-10 - enter or revise RT Bronchodilator order(s) to two equivalent RT bronchodilator orders with one order with TID Frequency and one order with Frequency of every 4 hours PRN wheezing or increased work of breathing using Per Protocol order mode. 11-13 - enter or revise RT Bronchodilator order(s) to one equivalent RT bronchodilator order with QID Frequency and an Albuterol order with Frequency of every 4 hours PRN wheezing or increased work of breathing using Per Protocol order mode. Greater than 13 - enter or revise RT Bronchodilator order(s) to one equivalent RT bronchodilator order with every 4 hours Frequency and an Albuterol order with Frequency of every 2 hours PRN wheezing or increased work of breathing using Per Protocol order mode.          Electronically signed by Tita Mckeon RCP on 11/23/2022 at 6:59 PM

## 2022-11-23 NOTE — PROGRESS NOTES
Reassessment done,patient is awake,alert and calm. She is on oxygen via high flow nasal cannula at 5L. Call light placed within reach and patient denies any further asisstance at the moment.

## 2022-11-23 NOTE — PROGRESS NOTES
Shift assessment done,patient is on oxygen via high flow nasal cannula at 5L. Call light placed within reach. 0900-Patient declined to have the nicotine patch. She also said she will take bupropion later as she was feeling tired and sleepy

## 2022-11-23 NOTE — PROGRESS NOTES
Report given to [address] [place] [address]. .  1825-Patient left the unit accompanied by SUMI Godinez on oxygen 3L

## 2022-11-23 NOTE — PROGRESS NOTES
11/23/22 0307   NIV Type   Mode Bilevel   Mask Type Full face mask   Mask Size Large   Settings/Measurements   IPAP 15 cmH20   CPAP/EPAP 5 cmH2O   Vt (Measured) 405 mL   Rate Ordered 14   Resp 25   FiO2  40 %

## 2022-11-24 PROCEDURE — 94761 N-INVAS EAR/PLS OXIMETRY MLT: CPT

## 2022-11-24 PROCEDURE — 99233 SBSQ HOSP IP/OBS HIGH 50: CPT | Performed by: INTERNAL MEDICINE

## 2022-11-24 PROCEDURE — 2580000003 HC RX 258: Performed by: INTERNAL MEDICINE

## 2022-11-24 PROCEDURE — 6370000000 HC RX 637 (ALT 250 FOR IP): Performed by: INTERNAL MEDICINE

## 2022-11-24 PROCEDURE — 2700000000 HC OXYGEN THERAPY PER DAY

## 2022-11-24 PROCEDURE — 94640 AIRWAY INHALATION TREATMENT: CPT

## 2022-11-24 PROCEDURE — 94660 CPAP INITIATION&MGMT: CPT

## 2022-11-24 PROCEDURE — 6360000002 HC RX W HCPCS: Performed by: INTERNAL MEDICINE

## 2022-11-24 PROCEDURE — 2060000000 HC ICU INTERMEDIATE R&B

## 2022-11-24 RX ORDER — IPRATROPIUM BROMIDE AND ALBUTEROL SULFATE 2.5; .5 MG/3ML; MG/3ML
1 SOLUTION RESPIRATORY (INHALATION) 4 TIMES DAILY
Status: DISCONTINUED | OUTPATIENT
Start: 2022-11-25 | End: 2022-11-25 | Stop reason: HOSPADM

## 2022-11-24 RX ADMIN — IPRATROPIUM BROMIDE AND ALBUTEROL SULFATE 1 AMPULE: 2.5; .5 SOLUTION RESPIRATORY (INHALATION) at 19:41

## 2022-11-24 RX ADMIN — OSELTAMIVIR PHOSPHATE 75 MG: 75 CAPSULE ORAL at 09:34

## 2022-11-24 RX ADMIN — BUPROPION HYDROCHLORIDE 300 MG: 150 TABLET, EXTENDED RELEASE ORAL at 09:34

## 2022-11-24 RX ADMIN — ENOXAPARIN SODIUM 30 MG: 100 INJECTION SUBCUTANEOUS at 09:35

## 2022-11-24 RX ADMIN — ASPIRIN 81 MG: 81 TABLET, COATED ORAL at 09:33

## 2022-11-24 RX ADMIN — CETIRIZINE HYDROCHLORIDE 5 MG: 10 TABLET ORAL at 09:34

## 2022-11-24 RX ADMIN — IPRATROPIUM BROMIDE AND ALBUTEROL SULFATE 1 AMPULE: 2.5; .5 SOLUTION RESPIRATORY (INHALATION) at 08:06

## 2022-11-24 RX ADMIN — Medication 10 ML: at 09:36

## 2022-11-24 RX ADMIN — Medication 10 ML: at 19:46

## 2022-11-24 RX ADMIN — TOPIRAMATE 25 MG: 25 TABLET, FILM COATED ORAL at 09:33

## 2022-11-24 RX ADMIN — IPRATROPIUM BROMIDE AND ALBUTEROL SULFATE 1 AMPULE: 2.5; .5 SOLUTION RESPIRATORY (INHALATION) at 15:11

## 2022-11-24 RX ADMIN — IPRATROPIUM BROMIDE AND ALBUTEROL SULFATE 1 AMPULE: 2.5; .5 SOLUTION RESPIRATORY (INHALATION) at 11:13

## 2022-11-24 RX ADMIN — PREDNISONE 40 MG: 20 TABLET ORAL at 09:34

## 2022-11-24 RX ADMIN — MUPIROCIN: 20 OINTMENT TOPICAL at 19:46

## 2022-11-24 RX ADMIN — FUROSEMIDE 40 MG: 40 TABLET ORAL at 09:33

## 2022-11-24 RX ADMIN — MUPIROCIN: 20 OINTMENT TOPICAL at 09:36

## 2022-11-24 RX ADMIN — RISPERIDONE 1 MG: 1 TABLET ORAL at 19:46

## 2022-11-24 RX ADMIN — OSELTAMIVIR PHOSPHATE 75 MG: 75 CAPSULE ORAL at 19:46

## 2022-11-24 RX ADMIN — FAMOTIDINE 20 MG: 20 TABLET ORAL at 09:34

## 2022-11-24 RX ADMIN — ENOXAPARIN SODIUM 30 MG: 100 INJECTION SUBCUTANEOUS at 19:46

## 2022-11-24 RX ADMIN — TOPIRAMATE 25 MG: 25 TABLET, FILM COATED ORAL at 19:46

## 2022-11-24 RX ADMIN — FAMOTIDINE 20 MG: 20 TABLET ORAL at 19:46

## 2022-11-24 NOTE — FLOWSHEET NOTE
11/24/22 0916   Vital Signs   Temp 97.4 °F (36.3 °C)   Temp Source Oral   Heart Rate 85   Heart Rate Source Monitor   Resp 18   /69   MAP (Calculated) 82   BP Location Right lower arm   BP Method Automatic   Patient Position High fowlers   Level of Consciousness 0   MEWS Score 1   Pain Assessment   Pain Assessment None - Denies Pain   Oxygen Therapy   SpO2 93 %   O2 Device High flow nasal cannula   O2 Flow Rate (L/min) 3 L/min     AM assessment complete. Pt a&o x4. Denies any pain or discomfort. C/o bipap use. States she does not want to utilize. Advised to speak with pulmonology during rounds. No edema noted. Currently on 3 via HFNC. Tolerating well, it does come off at times and she will desat to low 80's but once reapplied spo2 will return >90%.  at bedside sleeping. Call light and bedside table within reach.  Will continue to monitor

## 2022-11-24 NOTE — PROGRESS NOTES
Pulmonary Progress Note  CC: Respiratory failure, Influenza A    Subjective:  Does not like Bipap    EXAM: /69   Pulse 85   Temp 97.4 °F (36.3 °C) (Oral)   Resp 18   Ht 5' 7\" (1.702 m)   Wt 272 lb 9 oz (123.6 kg)   LMP 07/04/2016   SpO2 93%   BMI 42.69 kg/m²  on 4L  Constitutional:  No acute distress. Eyes: PERRL. Conjunctivae anicteric. ENT: Normal nose. Normal tongue. Neck:  Trachea is midline. No thyroid tenderness. Respiratory: No accessory muscle usage. + wheezes. No rales. No Rhonchi. Cardiovascular: Normal S1S2. No digit clubbing. No digit cyanosis. No LE edema. Psychiatric: No anxiety or Agitation. Alert and Oriented to person, place and time. Scheduled Meds:   buPROPion  300 mg Oral Daily    aspirin  81 mg Oral Daily    oseltamivir  75 mg Oral BID    famotidine  20 mg Oral BID    risperiDONE  1 mg Oral Nightly    cetirizine  5 mg Oral Daily    hydrOXYzine HCl  25 mg Oral Nightly    topiramate  25 mg Oral BID    sodium chloride flush  5-40 mL IntraVENous 2 times per day    enoxaparin  30 mg SubCUTAneous BID    nicotine  1 patch TransDERmal Daily    predniSONE  40 mg Oral Daily    furosemide  40 mg Oral Daily    ipratropium-albuterol  1 ampule Inhalation Q4H WA    mupirocin   Nasal BID     Continuous Infusions:   sodium chloride       PRN Meds:  guaiFENesin-dextromethorphan, sodium chloride flush, sodium chloride, ondansetron **OR** ondansetron, polyethylene glycol, acetaminophen **OR** acetaminophen, meclizine, albuterol    Labs:  CBC:   Recent Labs     11/22/22  0032 11/22/22  0620 11/23/22  0453   WBC 7.0 8.0 4.2   HGB 15.3 16.5* 15.8   HCT 45.8 49.7* 46.6   MCV 98.8 100.7* 98.8    138 125*       BMP:   Recent Labs     11/22/22  0032 11/22/22  0827 11/23/22  0453   * 135* 138   K 4.2 5.0 4.8   CL 94* 95* 97*   CO2 29 27 35*   PHOS  --   --  3.0   BUN 18 18 21*   CREATININE 1.2* 1.2* 1.0     Chest imaging was reviewed by me and showed 11/22/22 CTPA: no PE  1.   No evidence of pulmonary embolism or acute pulmonary abnormality. 2.  Emphysema. Unchanged appearance of bilateral pulmonary scars dating back   to at least 2009. ASSESSMENT:  Acute Hypercarbic and hypoxic respiratory failure   Influenza A  COPD and Asthma with acute exacerbation  Chronic diastolic CHF  CKD     PLAN:  Supplemental oxygen to maintain SaO2 >92%; wean as tolerated  Intensive inhaled bronchodilator therapy. IV solumedrol 40 mg IV Q12 hrs. Change to prednisone.   Tamiflu D#3  Acapella QID to mobilize respiratory secretions

## 2022-11-24 NOTE — PLAN OF CARE
Problem: Discharge Planning  Goal: Discharge to home or other facility with appropriate resources  11/24/2022 0342 by Morgan Blake RN  Outcome: Progressing  11/23/2022 1723 by Mario Mejia RN  Outcome: Progressing     Problem: Skin/Tissue Integrity  Goal: Absence of new skin breakdown  Description: 1. Monitor for areas of redness and/or skin breakdown  2. Assess vascular access sites hourly  3. Every 4-6 hours minimum:  Change oxygen saturation probe site  4. Every 4-6 hours:  If on nasal continuous positive airway pressure, respiratory therapy assess nares and determine need for appliance change or resting period.   11/24/2022 0342 by Morgan Blake RN  Outcome: Progressing  11/23/2022 1723 by Mario Mejia RN  Outcome: Progressing     Problem: Respiratory - Adult  Goal: Achieves optimal ventilation and oxygenation  11/24/2022 0342 by Morgan Blake RN  Outcome: Progressing  11/23/2022 1723 by Mario Mejia RN  Outcome: Progressing     Problem: Metabolic/Fluid and Electrolytes - Adult  Goal: Electrolytes maintained within normal limits  11/24/2022 0342 by Morgan Blake RN  Outcome: Progressing  11/23/2022 1723 by Mario Mejia RN  Outcome: Progressing  Goal: Hemodynamic stability and optimal renal function maintained  11/24/2022 0342 by Morgan Blake RN  Outcome: Progressing  11/23/2022 1723 by Mario Mejia RN  Outcome: Progressing     Problem: Chronic Conditions and Co-morbidities  Goal: Patient's chronic conditions and co-morbidity symptoms are monitored and maintained or improved  11/24/2022 0342 by Morgan Blake RN  Outcome: Progressing  11/23/2022 1723 by Mario Mejia RN  Outcome: Progressing     Problem: Safety - Adult  Goal: Free from fall injury  Outcome: Progressing     Problem: ABCDS Injury Assessment  Goal: Absence of physical injury  Outcome: Progressing

## 2022-11-24 NOTE — PROGRESS NOTES
11/23/22 2331   NIV Type   Skin Assessment Clean, dry, & intact   Skin Protection for O2 Device Yes   NIV Started/Stopped (S)  On   Equipment Type V60   Mode Bilevel   Mask Type Full face mask   Mask Size Large   Settings/Measurements   IPAP 15 cmH20   CPAP/EPAP 5 cmH2O   Vt (Measured) 573 mL   Rate Ordered 14   Resp 22   FiO2  40 %   Mask Leak (lpm) 21 lpm   Comfort Level Good   Using Accessory Muscles No   SpO2 95   Patient's Home Machine No   Alarm Settings   Alarms On Y

## 2022-11-24 NOTE — PROGRESS NOTES
Internal Medicine PCU Progress Note      Admitted to ICU on Bipap. Has COPD. She is a former smoker. No chest pain. She does not use oxygen at home. She was on 7 L Feeling better overall. No fever or chills. Has not been vaccinated against flu. Tested positive for Influenza A. Subjective:    Feels weak. SOB. Drowsy this am.   Wants to stay off BiPAP tonight. Invasive Lines: PIV        MV:  n/a    Recent Labs     22  0927 22  1251   PHART 7.229* 7.364   YBD4OHW 75.4* 55.6*   PO2ART 62.9* 126.5*         MV Settings:     / / /FiO2 : 40 %    IV:   sodium chloride         Vitals:  Temp  Av.6 °F (36.4 °C)  Min: 97.3 °F (36.3 °C)  Max: 98.1 °F (36.7 °C)  Pulse  Av.2  Min: 66  Max: 85  BP  Min: 91/79  Max: 153/90  SpO2  Av.6 %  Min: 93 %  Max: 98 %  FiO2   Av %  Min: 40 %  Max: 40 %  Patient Vitals for the past 4 hrs:   SpO2   22 1114 93 %         CVP:        Intake/Output Summary (Last 24 hours) at 2022 1406  Last data filed at 2022 0904  Gross per 24 hour   Intake 480 ml   Output 1500 ml   Net -1020 ml         EXAM:  General: No distress. Alert. Eyes: PERRL. No sclera icterus. No conjunctiva injected. ENT: No discharge. Pharynx clear. Neck: Trachea midline. Normal thyroid. Resp: No accessory muscle use. No crackles. + wheezing. No rhonchi. No dullness on percussion. CV: Regular rate. Regular rhythm. No mumur or rub. Trace edema. No JVD. Palpable pedal pulses. GI: Non-tender. Non-distended. No masses. No organmegaly. Normal bowel sounds. No hernia. Skin: Warm and dry. No nodule on exposed extremities. No rash on exposed extremities. Lymph: No cervical LAD. No supraclavicular LAD. M/S: No cyanosis. No joint deformity. No clubbing. Neuro: Awake. Follows commands. Positive pupils/gag/corneals. Normal pain response. Psych: Oriented to person, place, time. No anxiety or agitation.      Medications:  Scheduled Meds:   buPROPion  300 mg Oral Daily    aspirin  81 mg Oral Daily    oseltamivir  75 mg Oral BID    famotidine  20 mg Oral BID    risperiDONE  1 mg Oral Nightly    cetirizine  5 mg Oral Daily    hydrOXYzine HCl  25 mg Oral Nightly    topiramate  25 mg Oral BID    sodium chloride flush  5-40 mL IntraVENous 2 times per day    enoxaparin  30 mg SubCUTAneous BID    nicotine  1 patch TransDERmal Daily    predniSONE  40 mg Oral Daily    furosemide  40 mg Oral Daily    ipratropium-albuterol  1 ampule Inhalation Q4H WA    mupirocin   Nasal BID       PRN Meds:  guaiFENesin-dextromethorphan, sodium chloride flush, sodium chloride, ondansetron **OR** ondansetron, polyethylene glycol, acetaminophen **OR** acetaminophen, meclizine, albuterol    Results:  CBC:   Recent Labs     11/22/22 0032 11/22/22  0620 11/23/22  0453   WBC 7.0 8.0 4.2   HGB 15.3 16.5* 15.8   HCT 45.8 49.7* 46.6   MCV 98.8 100.7* 98.8    138 125*       BMP:   Recent Labs     11/22/22 0032 11/22/22  0827 11/23/22  0453   * 135* 138   K 4.2 5.0 4.8   CL 94* 95* 97*   CO2 29 27 35*   PHOS  --   --  3.0   BUN 18 18 21*   CREATININE 1.2* 1.2* 1.0       LIVER PROFILE:   Recent Labs     11/22/22 0032 11/22/22  0827   AST 20 35   ALT 12 15   BILITOT 0.5 0.3   ALKPHOS 68 72           Cultures:  COVID 19 neg  Influenza A detected    Films:    CT CHEST PULMONARY EMBOLISM W CONTRAST   Final Result   1. No evidence of pulmonary embolism or acute pulmonary abnormality. 2.  Emphysema. Unchanged appearance of bilateral pulmonary scars dating back   to at least 2009. XR CHEST PORTABLE   Final Result   Unchanged appearance of the chest without acute airspace disease identified. Assessment:    Principal Problem:    Acute respiratory failure with hypoxia and hypercarbia (HCC)  Active Problems:    Hypoxia    Influenza A    Chronic diastolic CHF (congestive heart failure) (HCC)    COPD exacerbation (HCC)  Resolved Problems:    * No resolved hospital problems. *       Plan:    Acute hypoxic/hypercapnic respiratory failure  Influenza A  COPD exacerbation  -pulmonology consulted  -Got Rocephin for one day and stopped. -Duonebs scheduled, Albuterol prn  -IV Solu-medrol  -Robitussin prn  -Tamiflu D#3     Chronic Diastolic CHF  -IV Lasix 40 mg once  -resumed PO Lasix  -daily weights; I&O's       CKD Stage 3a  -monitor labs       Depression, anxiety  Bipolar disorder  -continue home medication regimen       Allergic rhinitis  -cont Cetirizine      GERD  -cont Pepcid       She has quit smoking       Morbid obesity  - weight loss recommended. DVT Prophylaxis: Lovenox 30 mg BID  Diet: Reg diet.    Code Status: Full Code          Kely Parish MD 2:06 PM 11/24/2022

## 2022-11-24 NOTE — PROGRESS NOTES
RT Inhaler-Nebulizer Bronchodilator Protocol Note    There is a bronchodilator order in the chart from a provider indicating to follow the RT Bronchodilator Protocol and there is an Initiate RT Inhaler-Nebulizer Bronchodilator Protocol order as well (see protocol at bottom of note). CXR Findings:  No results found. The findings from the last RT Protocol Assessment were as follows:   History Pulmonary Disease: (P) Chronic pulmonary disease  Respiratory Pattern: (P) Dyspnea on exertion or RR 21-25 bpm  Breath Sounds: (P) Intermittent or unilateral wheezes  Cough: (P) Strong, spontaneous, non-productive  Indication for Bronchodilator Therapy: (P) Wheezing associated with pulm disorder  Bronchodilator Assessment Score: (P) 8    Aerosolized bronchodilator medication orders have been revised according to the RT Inhaler-Nebulizer Bronchodilator Protocol below. Respiratory Therapist to perform RT Therapy Protocol Assessment initially then follow the protocol. Repeat RT Therapy Protocol Assessment PRN for score 0-3 or on second treatment, BID, and PRN for scores above 3. No Indications - adjust the frequency to every 6 hours PRN wheezing or bronchospasm, if no treatments needed after 48 hours then discontinue using Per Protocol order mode. If indication present, adjust the RT bronchodilator orders based on the Bronchodilator Assessment Score as indicated below. Use Inhaler orders unless patient has one or more of the following: on home nebulizer, not able to hold breath for 10 seconds, is not alert and oriented, cannot activate and use MDI correctly, or respiratory rate 25 breaths per minute or more, then use the equivalent nebulizer order(s) with same Frequency and PRN reasons based on the score. If a patient is on this medication at home then do not decrease Frequency below that used at home.     0-3 - enter or revise RT bronchodilator order(s) to equivalent RT Bronchodilator order with Frequency of every 4 hours PRN for wheezing or increased work of breathing using Per Protocol order mode. 4-6 - enter or revise RT Bronchodilator order(s) to two equivalent RT bronchodilator orders with one order with BID Frequency and one order with Frequency of every 4 hours PRN wheezing or increased work of breathing using Per Protocol order mode. 7-10 - enter or revise RT Bronchodilator order(s) to two equivalent RT bronchodilator orders with one order with TID Frequency and one order with Frequency of every 4 hours PRN wheezing or increased work of breathing using Per Protocol order mode. 11-13 - enter or revise RT Bronchodilator order(s) to one equivalent RT bronchodilator order with QID Frequency and an Albuterol order with Frequency of every 4 hours PRN wheezing or increased work of breathing using Per Protocol order mode. Greater than 13 - enter or revise RT Bronchodilator order(s) to one equivalent RT bronchodilator order with every 4 hours Frequency and an Albuterol order with Frequency of every 2 hours PRN wheezing or increased work of breathing using Per Protocol order mode.        Electronically signed by Olivia Jacobs RCP on 11/24/2022 at 8:44 AM

## 2022-11-24 NOTE — FLOWSHEET NOTE
11/23/22 2017   Vital Signs   Temp 97.3 °F (36.3 °C)   Temp Source Oral   Heart Rate 66   Heart Rate Source Monitor   Resp 18   /77   MAP (Calculated) 90   BP Location Left lower arm   BP Method Automatic   Patient Position Semi fowlers   Level of Consciousness 0   MEWS Score 1   Pain Assessment   Pain Assessment None - Denies Pain   Oxygen Therapy   SpO2 94 %   O2 Device High flow nasal cannula   O2 Flow Rate (L/min) 3 L/min   Shift assessment completed. Patient awake in bed. A/Ox4. VSS. PM medications given. Spouse at bedside. Provided patient with food and drink. Patient denies any pain or any further needs at this time. Call light and bedside table within reach.

## 2022-11-25 VITALS
RESPIRATION RATE: 18 BRPM | OXYGEN SATURATION: 92 % | SYSTOLIC BLOOD PRESSURE: 125 MMHG | HEIGHT: 67 IN | BODY MASS INDEX: 42.8 KG/M2 | TEMPERATURE: 97.2 F | HEART RATE: 58 BPM | WEIGHT: 272.7 LBS | DIASTOLIC BLOOD PRESSURE: 71 MMHG

## 2022-11-25 DIAGNOSIS — J45.40 MODERATE PERSISTENT ASTHMA WITHOUT COMPLICATION: ICD-10-CM

## 2022-11-25 DIAGNOSIS — J44.9 CHRONIC OBSTRUCTIVE PULMONARY DISEASE, UNSPECIFIED COPD TYPE (HCC): ICD-10-CM

## 2022-11-25 PROCEDURE — 6370000000 HC RX 637 (ALT 250 FOR IP): Performed by: INTERNAL MEDICINE

## 2022-11-25 PROCEDURE — 99233 SBSQ HOSP IP/OBS HIGH 50: CPT | Performed by: INTERNAL MEDICINE

## 2022-11-25 PROCEDURE — 94640 AIRWAY INHALATION TREATMENT: CPT

## 2022-11-25 PROCEDURE — 2580000003 HC RX 258: Performed by: INTERNAL MEDICINE

## 2022-11-25 PROCEDURE — 94761 N-INVAS EAR/PLS OXIMETRY MLT: CPT

## 2022-11-25 PROCEDURE — 2700000000 HC OXYGEN THERAPY PER DAY

## 2022-11-25 PROCEDURE — 6360000002 HC RX W HCPCS: Performed by: INTERNAL MEDICINE

## 2022-11-25 RX ORDER — OSELTAMIVIR PHOSPHATE 75 MG/1
75 CAPSULE ORAL 2 TIMES DAILY
Qty: 3 CAPSULE | Refills: 0 | Status: SHIPPED | OUTPATIENT
Start: 2022-11-25 | End: 2022-11-27

## 2022-11-25 RX ORDER — PREDNISONE 10 MG/1
TABLET ORAL
Qty: 14 TABLET | Refills: 0 | Status: SHIPPED | OUTPATIENT
Start: 2022-11-25

## 2022-11-25 RX ADMIN — MUPIROCIN: 20 OINTMENT TOPICAL at 09:10

## 2022-11-25 RX ADMIN — IPRATROPIUM BROMIDE AND ALBUTEROL SULFATE 1 AMPULE: 2.5; .5 SOLUTION RESPIRATORY (INHALATION) at 11:41

## 2022-11-25 RX ADMIN — ENOXAPARIN SODIUM 30 MG: 100 INJECTION SUBCUTANEOUS at 09:10

## 2022-11-25 RX ADMIN — TOPIRAMATE 25 MG: 25 TABLET, FILM COATED ORAL at 09:10

## 2022-11-25 RX ADMIN — Medication 10 ML: at 09:11

## 2022-11-25 RX ADMIN — ACETAMINOPHEN 650 MG: 325 TABLET ORAL at 01:06

## 2022-11-25 RX ADMIN — IPRATROPIUM BROMIDE AND ALBUTEROL SULFATE 1 AMPULE: 2.5; .5 SOLUTION RESPIRATORY (INHALATION) at 07:38

## 2022-11-25 RX ADMIN — CETIRIZINE HYDROCHLORIDE 5 MG: 10 TABLET ORAL at 09:11

## 2022-11-25 RX ADMIN — ASPIRIN 81 MG: 81 TABLET, COATED ORAL at 09:10

## 2022-11-25 RX ADMIN — OSELTAMIVIR PHOSPHATE 75 MG: 75 CAPSULE ORAL at 09:10

## 2022-11-25 RX ADMIN — BUPROPION HYDROCHLORIDE 300 MG: 150 TABLET, EXTENDED RELEASE ORAL at 09:10

## 2022-11-25 RX ADMIN — FUROSEMIDE 40 MG: 40 TABLET ORAL at 09:10

## 2022-11-25 RX ADMIN — FAMOTIDINE 20 MG: 20 TABLET ORAL at 09:10

## 2022-11-25 RX ADMIN — PREDNISONE 40 MG: 20 TABLET ORAL at 09:10

## 2022-11-25 ASSESSMENT — PAIN DESCRIPTION - ORIENTATION: ORIENTATION: MID

## 2022-11-25 ASSESSMENT — PAIN DESCRIPTION - DESCRIPTORS: DESCRIPTORS: ACHING

## 2022-11-25 ASSESSMENT — PAIN DESCRIPTION - LOCATION: LOCATION: COCCYX

## 2022-11-25 ASSESSMENT — PAIN SCALES - GENERAL
PAINLEVEL_OUTOF10: 0
PAINLEVEL_OUTOF10: 0
PAINLEVEL_OUTOF10: 5

## 2022-11-25 ASSESSMENT — PAIN - FUNCTIONAL ASSESSMENT: PAIN_FUNCTIONAL_ASSESSMENT: ACTIVITIES ARE NOT PREVENTED

## 2022-11-25 NOTE — PROGRESS NOTES
O2 Sat at rest on Room Air is 87%    O2 Sat at rest with Oxygen at 2L/min is 87%  O2 Sat at rest with Oxygen at 3L/min is 91%    O2Sat with Activity at 4L/min is 87%  O2 Sat with Activity at 5L/min.  Is 90%

## 2022-11-25 NOTE — CARE COORDINATION
DISCHARGE ORDER  Date/Time 2022 1:54 PM  Completed by: Magaly Pedro RN, Case Management    Patient Name: Joon Sykes      : 1965  Admitting Diagnosis: Hyponatremia [E87.1]  Influenza A [J10.1]  Hypoxia [R09.02]  Acute respiratory failure with hypoxia and hypercapnia (HCC) [J96.01, J96.02]  Congestive heart failure, unspecified HF chronicity, unspecified heart failure type ([facility] [address] [I50.9]      Admit order Date and Status:IP 2022  (verify MD's last order for status of admission)      Noted discharge order. If applicable PT/OT recommendation at Discharge: NICHOLE PRINCE recommendation by PT/OT:NICHOLE    Discharge Plan: Chart reviewed. Met with pt via phone and explained the role of the CM. Plans to return home. IPTA. Lives w/SO. . +family provides  assistance. New home O2 arranged with patints permission with [facility]. Pt sent home with 2 e-tanks and instructed to call [facility] upon DC to arrange deliver of home concentrator and additional e-tanks. Orders processed and approved by [facility]. [facility]. No other DC needs identified. Date of Last IMM Given: NA      Has Home O2 in place on admit:  Yes ([facility])  Informed of need to bring portable home O2 tank on day of discharge for nursing to connect prior to leaving:   Yes  Verbalized agreement/Understanding:   Yes  Pt is being d/c'd to home today. Pt's O2 sats are 92% on 3 liters. Discharge timeout done with Alban Goodman. All discharge needs and concerns addressed.

## 2022-11-25 NOTE — PROGRESS NOTES
RT Inhaler-Nebulizer Bronchodilator Protocol Note    There is a bronchodilator order in the chart from a provider indicating to follow the RT Bronchodilator Protocol and there is an Initiate RT Inhaler-Nebulizer Bronchodilator Protocol order as well (see protocol at bottom of note). CXR Findings:  No results found. The findings from the last RT Protocol Assessment were as follows:   History Pulmonary Disease: Chronic pulmonary disease  Respiratory Pattern: Dyspnea on exertion or RR 21-25 bpm  Breath Sounds: Slightly diminished and/or crackles  Cough: Strong, spontaneous, non-productive  Indication for Bronchodilator Therapy: Decreased or absent breath sounds  Bronchodilator Assessment Score: 6    Aerosolized bronchodilator medication orders have been revised according to the RT Inhaler-Nebulizer Bronchodilator Protocol below. Respiratory Therapist to perform RT Therapy Protocol Assessment initially then follow the protocol. Repeat RT Therapy Protocol Assessment PRN for score 0-3 or on second treatment, BID, and PRN for scores above 3. No Indications - adjust the frequency to every 6 hours PRN wheezing or bronchospasm, if no treatments needed after 48 hours then discontinue using Per Protocol order mode. If indication present, adjust the RT bronchodilator orders based on the Bronchodilator Assessment Score as indicated below. Use Inhaler orders unless patient has one or more of the following: on home nebulizer, not able to hold breath for 10 seconds, is not alert and oriented, cannot activate and use MDI correctly, or respiratory rate 25 breaths per minute or more, then use the equivalent nebulizer order(s) with same Frequency and PRN reasons based on the score. If a patient is on this medication at home then do not decrease Frequency below that used at home.     0-3 - enter or revise RT bronchodilator order(s) to equivalent RT Bronchodilator order with Frequency of every 4 hours PRN for wheezing or increased work of breathing using Per Protocol order mode. 4-6 - enter or revise RT Bronchodilator order(s) to two equivalent RT bronchodilator orders with one order with BID Frequency and one order with Frequency of every 4 hours PRN wheezing or increased work of breathing using Per Protocol order mode. 7-10 - enter or revise RT Bronchodilator order(s) to two equivalent RT bronchodilator orders with one order with TID Frequency and one order with Frequency of every 4 hours PRN wheezing or increased work of breathing using Per Protocol order mode. 11-13 - enter or revise RT Bronchodilator order(s) to one equivalent RT bronchodilator order with QID Frequency and an Albuterol order with Frequency of every 4 hours PRN wheezing or increased work of breathing using Per Protocol order mode. Greater than 13 - enter or revise RT Bronchodilator order(s) to one equivalent RT bronchodilator order with every 4 hours Frequency and an Albuterol order with Frequency of every 2 hours PRN wheezing or increased work of breathing using Per Protocol order mode.        Electronically signed by Graham Sommer RCP on 11/24/2022 at 7:44 PM

## 2022-11-25 NOTE — PROGRESS NOTES
Pulmonary Progress Note  CC: Respiratory failure, Influenza A    Subjective:   Still hypoxic, less SOB  EXAM: /71   Pulse 58   Temp 97.2 °F (36.2 °C) (Oral)   Resp 18   Ht 5' 7\" (1.702 m)   Wt 272 lb 11.2 oz (123.7 kg)   LMP 07/04/2016   SpO2 92%   BMI 42.71 kg/m²  on 3L  Constitutional:  No acute distress. Eyes: PERRL. Conjunctivae anicteric. ENT: Normal nose. Normal tongue. Neck:  Trachea is midline. No thyroid tenderness. Respiratory: No accessory muscle usage. + wheezes. No rales. No Rhonchi. Cardiovascular: Normal S1S2. No digit clubbing. No digit cyanosis. No LE edema. Psychiatric: No anxiety or Agitation. Alert and Oriented to person, place and time. Scheduled Meds:   ipratropium-albuterol  1 ampule Inhalation 4x daily    buPROPion  300 mg Oral Daily    aspirin  81 mg Oral Daily    oseltamivir  75 mg Oral BID    famotidine  20 mg Oral BID    risperiDONE  1 mg Oral Nightly    cetirizine  5 mg Oral Daily    hydrOXYzine HCl  25 mg Oral Nightly    topiramate  25 mg Oral BID    sodium chloride flush  5-40 mL IntraVENous 2 times per day    enoxaparin  30 mg SubCUTAneous BID    nicotine  1 patch TransDERmal Daily    predniSONE  40 mg Oral Daily    furosemide  40 mg Oral Daily    mupirocin   Nasal BID     Continuous Infusions:   sodium chloride       PRN Meds:  guaiFENesin-dextromethorphan, sodium chloride flush, sodium chloride, ondansetron **OR** ondansetron, polyethylene glycol, acetaminophen **OR** acetaminophen, meclizine, albuterol    Labs:  CBC:   Recent Labs     11/23/22  0453   WBC 4.2   HGB 15.8   HCT 46.6   MCV 98.8   *       BMP:   Recent Labs     11/23/22  0453      K 4.8   CL 97*   CO2 35*   PHOS 3.0   BUN 21*   CREATININE 1.0     Chest imaging was reviewed by me and showed 11/22/22 CTPA: no PE  1. No evidence of pulmonary embolism or acute pulmonary abnormality. 2.  Emphysema.   Unchanged appearance of bilateral pulmonary scars dating back   to at least 2009.         ASSESSMENT:  Acute Hypercarbic and hypoxic respiratory failure   Influenza A  COPD and Asthma with acute exacerbation  Chronic diastolic CHF  CKD     PLAN:  Supplemental oxygen to maintain SaO2 >92%; wean as tolerated  Intensive inhaled bronchodilator therapy. Taper prednisone.   Tamiflu   Acapella QID to mobilize respiratory secretions

## 2022-11-25 NOTE — FLOWSHEET NOTE
11/25/22 0117   Vital Signs   Temp 97.3 °F (36.3 °C)   Temp Source Oral   Heart Rate 62   Heart Rate Source Monitor   Resp 18   BP (!) 109/55   MAP (Calculated) 73   BP Location Right lower arm   Level of Consciousness 0   MEWS Score 1   Oxygen Therapy   SpO2 90 %   Pulse Oximeter Device Mode Intermittent   Pulse Oximeter Device Location Finger   O2 Device High flow nasal cannula   O2 Flow Rate (L/min) 3 L/min   Height and Weight   Weight 272 lb 11.2 oz (123.7 kg)   Weight Method Bed scale   BMI (Calculated) 42.8     Pt awake in bed. VS as shown above. Pt denies any further needs at this time. Call light in reach and bed in lowest position.

## 2022-11-25 NOTE — PROGRESS NOTES
RT Inhaler-Nebulizer Bronchodilator Protocol Note    There is a bronchodilator order in the chart from a provider indicating to follow the RT Bronchodilator Protocol and there is an Initiate RT Inhaler-Nebulizer Bronchodilator Protocol order as well (see protocol at bottom of note). CXR Findings:  No results found. The findings from the last RT Protocol Assessment were as follows:   History Pulmonary Disease: (P) Chronic pulmonary disease  Respiratory Pattern: (P) Dyspnea on exertion or RR 21-25 bpm  Breath Sounds: (P) Slightly diminished and/or crackles  Cough: (P) Strong, spontaneous, non-productive  Indication for Bronchodilator Therapy: (P) Decreased or absent breath sounds  Bronchodilator Assessment Score: (P) 6    Aerosolized bronchodilator medication orders have been revised according to the RT Inhaler-Nebulizer Bronchodilator Protocol below. Respiratory Therapist to perform RT Therapy Protocol Assessment initially then follow the protocol. Repeat RT Therapy Protocol Assessment PRN for score 0-3 or on second treatment, BID, and PRN for scores above 3. No Indications - adjust the frequency to every 6 hours PRN wheezing or bronchospasm, if no treatments needed after 48 hours then discontinue using Per Protocol order mode. If indication present, adjust the RT bronchodilator orders based on the Bronchodilator Assessment Score as indicated below. Use Inhaler orders unless patient has one or more of the following: on home nebulizer, not able to hold breath for 10 seconds, is not alert and oriented, cannot activate and use MDI correctly, or respiratory rate 25 breaths per minute or more, then use the equivalent nebulizer order(s) with same Frequency and PRN reasons based on the score. If a patient is on this medication at home then do not decrease Frequency below that used at home.     0-3 - enter or revise RT bronchodilator order(s) to equivalent RT Bronchodilator order with Frequency of every 4 hours PRN for wheezing or increased work of breathing using Per Protocol order mode. 4-6 - enter or revise RT Bronchodilator order(s) to two equivalent RT bronchodilator orders with one order with BID Frequency and one order with Frequency of every 4 hours PRN wheezing or increased work of breathing using Per Protocol order mode. 7-10 - enter or revise RT Bronchodilator order(s) to two equivalent RT bronchodilator orders with one order with TID Frequency and one order with Frequency of every 4 hours PRN wheezing or increased work of breathing using Per Protocol order mode. 11-13 - enter or revise RT Bronchodilator order(s) to one equivalent RT bronchodilator order with QID Frequency and an Albuterol order with Frequency of every 4 hours PRN wheezing or increased work of breathing using Per Protocol order mode. Greater than 13 - enter or revise RT Bronchodilator order(s) to one equivalent RT bronchodilator order with every 4 hours Frequency and an Albuterol order with Frequency of every 2 hours PRN wheezing or increased work of breathing using Per Protocol order mode.          Electronically signed by Corky Garcia RCP on 11/25/2022 at 7:42 AM

## 2022-11-25 NOTE — DISCHARGE SUMMARY
Hospital Medicine Discharge Summary    Patient ID: Anastacio Mckee      Patient's PCP: Ivett Silverio, RN, NP    Admit Date: 11/21/2022     Discharge Date:   11/25/2022    Admitting Provider: Chucho Viveros MD     Discharge Provider: Partha Haq MD     Discharge Diagnoses: Active Hospital Problems    Diagnosis     Acute respiratory failure with hypoxia and hypercarbia (HCC) [J96.01, J96.02]      Priority: Medium    Hypoxia [R09.02]      Priority: Medium    Influenza A [J10.1]      Priority: Medium    Chronic diastolic CHF (congestive heart failure) ([facility]) [I50.32]      Priority: Medium    COPD exacerbation ([facility] [address] [J44.1]        The patient was seen and examined on day of discharge and this discharge summary is in conjunction with any daily progress note from day of discharge. Hospital Course: 53yo woman Hx of morbid obesity, COPD, no o2 prior to admission, CHF, former smoker, presented with sever dyspnea. Acute hypoxic/hypercapnic respiratory failure  Influenza A  COPD exacerbation  -pulmonology consulted  -Got Rocephin for one day and stopped. -Duonebs scheduled, Albuterol prn  -IV Solu-medrol  -Robitussin prn  -Tamiflu D#4     Chronic Diastolic CHF  -IV Lasix 40 mg once  -resumed PO Lasix  -daily weights; I&O's        CKD Stage 3a  -monitor labs        Depression, anxiety  Bipolar disorder  -continue home medication regimen        Allergic rhinitis  -cont Cetirizine       GERD  -cont Pepcid        She has quit smoking         Morbid obesity  - weight loss recommended. Had a long discussion with pt about need for home O2. Also discussed need for sleep study at length - orders placed in Epic. Physical Exam Performed:     /71   Pulse 58   Temp 97.2 °F (36.2 °C) (Oral)   Resp 18   Ht 5' 7\" (1.702 m)   Wt 272 lb 11.2 oz (123.7 kg)   LMP 07/04/2016   SpO2 92%   BMI 42.71 kg/m²       General: No distress. Alert. Eyes: PERRL. No sclera icterus.  No conjunctiva injected. ENT: No discharge. Pharynx clear. Neck: Trachea midline. Normal thyroid. Resp: No accessory muscle use. No crackles. + wheezing. No rhonchi. No dullness on percussion. CV: Regular rate. Regular rhythm. No mumur or rub. Trace edema. No JVD. Palpable pedal pulses. GI: Non-tender. Non-distended. No masses. No organmegaly. Normal bowel sounds. No hernia. Skin: Warm and dry. No nodule on exposed extremities. No rash on exposed extremities. Lymph: No cervical LAD. No supraclavicular LAD. M/S: No cyanosis. No joint deformity. No clubbing. Neuro: Awake. Follows commands. Positive pupils/gag/corneals. Normal pain response. Psych: Oriented to person, place, time. No anxiety or agitation. Labs: For convenience and continuity at follow-up the following most recent labs are provided:      CBC:    Lab Results   Component Value Date/Time    WBC 4.2 11/23/2022 04:53 AM    HGB 15.8 11/23/2022 04:53 AM    HCT 46.6 11/23/2022 04:53 AM     11/23/2022 04:53 AM       Renal:    Lab Results   Component Value Date/Time     11/23/2022 04:53 AM    K 4.8 11/23/2022 04:53 AM    K 5.0 11/22/2022 08:27 AM    CL 97 11/23/2022 04:53 AM    CO2 35 11/23/2022 04:53 AM    BUN 21 11/23/2022 04:53 AM    CREATININE 1.0 11/23/2022 04:53 AM    CALCIUM 9.3 11/23/2022 04:53 AM    PHOS 3.0 11/23/2022 04:53 AM         Significant Diagnostic Studies    Radiology:   CT CHEST PULMONARY EMBOLISM W CONTRAST   Final Result   1. No evidence of pulmonary embolism or acute pulmonary abnormality. 2.  Emphysema. Unchanged appearance of bilateral pulmonary scars dating back   to at least 2009. XR CHEST PORTABLE   Final Result   Unchanged appearance of the chest without acute airspace disease identified.                 Consults:     IP CONSULT TO PULMONOLOGY    Disposition:  home    Condition at Discharge: Stable    Discharge Instructions/Follow-up:  PCP 1 week     Code Status:  Full Code     Activity: activity as tolerated    Diet: regular diet      Discharge Medications:     Current Discharge Medication List             Details   oseltamivir (TAMIFLU) 75 MG capsule Take 1 capsule by mouth 2 times daily for 3 doses  Qty: 3 capsule, Refills: 0      predniSONE (DELTASONE) 10 MG tablet Take 2 tabs by mouth once daily for 4 days. Then take 1 tab by mouth once daily for 4 days. Then take 1/2 tab by mouth once daily for 4 days. Then stop. (14tabs)  Qty: 14 tablet, Refills: 0                Details   tiotropium (SPIRIVA RESPIMAT) 2.5 MCG/ACT AERS inhaler INHALE 2 PUFFS BY MOUTH EVERY DAY  Qty: 4 g, Refills: 5    Associated Diagnoses: Chronic obstructive pulmonary disease, unspecified COPD type ([facility] Moderate persistent asthma without complication      ASPIRIN LOW DOSE 81 MG EC tablet 1 tablet      risperiDONE (RISPERDAL) 1 MG tablet 1 tablet      albuterol (PROVENTIL) (2.5 MG/3ML) 0.083% nebulizer solution inhale contents of 1 vial (3ml) via nebulizer every 6 hours as needed for wheezing or shortness of breath  Qty: 360 mL, Refills: 5    Associated Diagnoses: Chronic obstructive pulmonary disease, unspecified COPD type ([facility])      budesonide-formoterol (SYMBICORT) 160-4.5 MCG/ACT AERO Inhale 2 puffs into the lungs 2 times daily  Qty: 10.2 g, Refills: 2      VENTOLIN  (90 Base) MCG/ACT inhaler INHALE 2 PUFFS BY MOUTH EVERY SIX HOURS AS NEEDED FOR wheezing or SHORTNESS OF BREATH  Qty: 18 g, Refills: 1      furosemide (LASIX) 40 MG tablet Take 1 tablet by mouth daily  Qty: 45 tablet, Refills: 0    Associated Diagnoses: Acute diastolic congestive heart failure ([facility]  Shortness of breath      topiramate (TOPAMAX) 25 MG tablet Take 25 mg by mouth 2 times daily      buPROPion (WELLBUTRIN XL) 300 MG extended release tablet       loratadine (CLARITIN) 10 MG tablet Take 1 tablet by mouth daily  Qty: 30 tablet, Refills: 6      famotidine (PEPCID) 20 MG tablet TAKE 1 TABLET BY MOUTH TWO TIMES A DAY      meclizine (ANTIVERT) 25 MG tablet Take 25 mg by mouth 3 times daily as needed PRN      Cholecalciferol (VITAMIN D3) 1.25 MG (01477 UT) CAPS Take by mouth once a week      hydrOXYzine (VISTARIL) 50 MG capsule Take 25 mg by mouth nightly             Time Spent on discharge: 30min in the examination, evaluation, counseling and review of medications and discharge plan. Signed:    Leonidas Pascual MD   11/25/2022      Thank you Gunjan Garcia RN, NP for the opportunity to be involved in this patient's care. If you have any questions or concerns, please feel free to contact me at [phone].

## 2022-11-25 NOTE — DISCHARGE INSTRUCTIONS
Hyponatremia: Care Instructions  Your Care Instructions     Hyponatremia (say \"[id number]-[id number]-humberto-uh\") means that you don't have enough sodium in your blood. It can cause nausea, vomiting, and headaches. Or you may not feel hungry. In serious cases, it can cause seizures, a coma, or even death. Hyponatremia is not a disease. It is a problem caused by something else, such as medicines or exercising for a long time in hot weather. You can get hyponatremia if you lose a lot of fluids and then you drink a lot of water or other liquids that don't have much sodium. You can also get it if you have kidney, liver, heart, or other health problems. Treatment is focused on getting your sodium levels back to normal.  Follow-up care is a key part of your treatment and safety. Be sure to make and go to all appointments, and call your doctor if you are having problems. It's also a good idea to know your test results and keep a list of the medicines you take. How can you care for yourself at home? If your doctor recommends it, drink fluids that have sodium. Sports drinks are a good choice. Or you can eat salty foods. If your doctor recommends it, limit the amount of water you drink. And limit fluids that are mostly water. These include tea, coffee, and juice. Take your medicines exactly as prescribed. Call your doctor if you have any problems with your medicine. Get your sodium levels tested when your doctor tells you to. When should you call for help? Call 911 anytime you think you may need emergency care. For example, call if:    You have a seizure. You passed out (lost consciousness). Call your doctor now or seek immediate medical care if:    You are confused or it is hard to focus. You have little or no appetite. You feel sick to your stomach or you vomit. You have a headache. You have mood changes.      You feel more tired than usual.   Watch closely for changes in your health, and be sure to contact your doctor if:    You do not get better as expected. Current as of: June 16, 2022               Content Version: 13.4  © 2006-2022 [employer]. Care instructions adapted under license by [facility] ([facility]). If you have questions about a medical condition or this instruction, always ask your healthcare professional. [address] any warranty or liability for your use of this information. Chronic Obstructive Pulmonary Disease (COPD): Care Instructions  Overview     Chronic obstructive pulmonary disease (COPD) is a lung disease that makes it hard to breathe. With COPD, the airways that lead to the lungs are narrowed, and the tiny air sacs in the lungs are damaged and lose their stretch. People with COPD have decreased airflow in and out of the lungs, which makes it hard to breathe. The airways also can get clogged with thick mucus. Cigarette smoking is a major cause of COPD. Although there is no cure for COPD, you can slow its progress. Following your treatment plan and taking care of yourself can help you feel better and live longer. Follow-up care is a key part of your treatment and safety. Be sure to make and go to all appointments, and call your doctor if you are having problems. It's also a good idea to know your test results and keep a list of the medicines you take. How can you care for yourself at home? Staying healthy    Do not smoke. This is the most important step you can take to prevent more damage to your lungs. If you need help quitting, talk to your doctor about stop-smoking programs and medicines. These can increase your chances of quitting for good. Avoid colds and other infections. Get the pneumococcal and whooping cough (pertussis) vaccines. If you have had these vaccines before, ask your doctor if you need another dose. Get the flu vaccine every fall. If you must be around people with colds or the flu, wash your hands often.      Avoid secondhand smoke and air pollution. Try to stay inside with your windows closed when air pollution is bad. Medicines and oxygen therapy    Take your medicines exactly as prescribed. Call your doctor if you think you are having a problem with your medicine. You may be taking medicines such as:  Bronchodilators. These help open your airways and make breathing easier. They are either short-acting (work for 4 to 9 hours) or long-acting (work for 12 to 24 hours). You inhale most bronchodilators, so they start to act quickly. Always carry your quick-relief inhaler with you in case you need it. Corticosteroids (prednisone, budesonide). These reduce airway inflammation. They come in inhaled or pill form. Ask your doctor or pharmacist if you are using each type of inhaler correctly. With correct use, the medicine is more likely to get to your lungs. See if a spacer is right for you. A spacer may also help you get more inhaled medicine to your lungs. If you use one, ask how to use it properly. Do not take any vitamins, over-the-counter medicine, or herbal products without talking to your doctor first.     If your doctor prescribed antibiotics, take them as directed. Do not stop taking them just because you feel better. You need to take the full course of antibiotics. If you use oxygen therapy, use the flow rate your doctor has recommended. Don't change it without talking to your doctor first. Oxygen therapy boosts the amount of oxygen in your blood and helps you breathe easier. Activity    Get regular exercise. Walking is an easy way to get exercise. Start out slowly, and walk a little more each day. Pay attention to your breathing. You are exercising too hard if you can't talk while you exercise. Take short rest breaks when doing household chores and other activities. Learn breathing methods--such as breathing through pursed lips--to help you become less short of breath.      If your doctor has not set you up with a pulmonary rehabilitation program, ask if rehab is right for you. Rehab includes exercise programs, education about your disease and how to manage it, help with diet and other changes, and emotional support. Diet    Eat regular, healthy meals. Use bronchodilators about 1 hour before you eat to make it easier to eat. Eat several smaller, frequent meals to prevent getting too full. A full stomach can push on the muscle that helps you breathe (your diaphragm) and make it harder to breathe. Drink beverages at the end of the meal.     Avoid foods that are hard to chew. Eat foods that contain protein so you don't lose muscle mass. Talk with your doctor if you gain too much weight or if you lose weight without trying. Mental health    Talk to your family, friends, or a therapist about your feelings. Some people feel frightened, angry, hopeless, helpless, and even guilty. Talking openly about feelings may help you cope. If these feelings last, talk to your doctor. When should you call for help? Call 911 anytime you think you may need emergency care. For example, call if:    You have severe trouble breathing. You have severe chest pain. Call your doctor now or seek immediate medical care if:    You have new or worse trouble breathing. You have new or worse chest pain. You cough up blood. You have a fever. Watch closely for changes in your health, and be sure to contact your doctor if:    You cough more deeply or more often, especially if you notice more mucus or a change in the color of your mucus. You have new or worse swelling in your legs or belly. You have feelings of anxiety or depression. You need to use your antibiotic or steroid pills. You are not getting better as expected. Where can you learn more? Go to [email or web address]://syd[email or web address] org and sign in to your [email or web address] account.  Enter M953 in the [facility] box to learn more about \"Chronic Obstructive Pulmonary Disease (COPD): Care Instructions. \"     If you do not have an account, please click on the \"Sign Up Now\" link. Current as of: March 9, 2022               Content Version: 13.4  © 7651-3394 [employer]. Care instructions adapted under license by [facility] ([facility]). If you have questions about a medical condition or this instruction, always ask your healthcare professional. [address] any warranty or liability for your use of this information.

## 2022-11-25 NOTE — PROGRESS NOTES
DC Instructions have been reviewed with patient and spouse with a verbal understanding. Patient dressed and stated she has all of her belongings. Patient has been provided a pill splitter pulse oximeter. Patient educated on use of oxygen tanks and pulse oximeter. Patients IV removed with no complications. Patient currently waiting on father to come to hospital to take her home.

## 2022-11-26 LAB
BLOOD CULTURE, ROUTINE: NORMAL
CULTURE, BLOOD 2: NORMAL

## 2022-11-29 ENCOUNTER — SCHEDULED TELEPHONE ENCOUNTER (OUTPATIENT)
Dept: PULMONOLOGY | Age: 57
End: 2022-11-29
Payer: COMMERCIAL

## 2022-11-29 ENCOUNTER — TELEPHONE (OUTPATIENT)
Dept: PULMONOLOGY | Age: 57
End: 2022-11-29

## 2022-11-29 DIAGNOSIS — J30.9 ALLERGIC RHINITIS, UNSPECIFIED SEASONALITY, UNSPECIFIED TRIGGER: ICD-10-CM

## 2022-11-29 DIAGNOSIS — J44.9 CHRONIC OBSTRUCTIVE PULMONARY DISEASE, UNSPECIFIED COPD TYPE (HCC): ICD-10-CM

## 2022-11-29 DIAGNOSIS — J45.40 MODERATE PERSISTENT ASTHMA WITHOUT COMPLICATION: Primary | ICD-10-CM

## 2022-11-29 PROCEDURE — 99442 PR PHYS/QHP TELEPHONE EVALUATION 11-20 MIN: CPT | Performed by: INTERNAL MEDICINE

## 2022-11-29 RX ORDER — LURASIDONE HYDROCHLORIDE 20 MG/1
TABLET, FILM COATED ORAL
COMMUNITY
Start: 2022-10-06

## 2022-11-29 RX ORDER — CETIRIZINE HYDROCHLORIDE 10 MG/1
TABLET ORAL
COMMUNITY
Start: 2022-11-21

## 2022-11-29 RX ORDER — CLOTRIMAZOLE 1 G/ML
SOLUTION TOPICAL
COMMUNITY
Start: 2022-11-23

## 2022-11-29 RX ORDER — AMOXICILLIN AND CLAVULANATE POTASSIUM 875; 125 MG/1; MG/1
TABLET, FILM COATED ORAL
COMMUNITY
Start: 2022-11-14

## 2022-11-29 RX ORDER — MELATONIN
COMMUNITY
Start: 2022-11-21

## 2022-11-29 NOTE — PROGRESS NOTES
[facility]                                                            Outpatient Follow Up Note  TELEHEALTH EVALUATION: Service performed was Audio (During [facility]- public health emergency) and not a face-to-face visit       CHIEF COMPLAINT: Follow up COPD      HPI:   Discharged 11/25/22 treated for Flu and COPD AE  Tried BiPAP inpatient and she hated it   Doing much better  Minimal SOB  No smoking   Uses Albuterol 3 times/week  Send home on O2   O2 sat 96% on RA   Past Medical History:   Diagnosis Date    Allergic rhinitis     Anxiety     Asthma     Bipolar disorder, mixed (HCC)     COPD (chronic obstructive pulmonary disease) ([facility])     COP[facility]chronic obstructive pulmonary disease) [facility] 5/7/2021    Depression     Eczema     Pulmonary nodule     Tobacco abuse        Past Surgical History:        Procedure Laterality Date    BRONCHOSCOPY      TONSILLECTOMY         Allergies:  is allergic to acetaminophen-codeine, darvocet [propoxyphene n-acetaminophen], eggs or egg-derived products, percocet [oxycodone-acetaminophen], shrimp flavor, and vicodin [hydrocodone-acetaminophen]. Social History:    TOBACCO:   reports that she has been smoking cigarettes. She has a 68.00 pack-year smoking history. She has never used smokeless tobacco.  ETOH:   reports no history of alcohol use.       Family History:       Problem Relation Age of Onset    Diabetes Mother     Hypertension Father     Asthma Neg Hx     Cancer Neg Hx     Emphysema Neg Hx     Heart Failure Neg Hx        Current Medications:    Current Outpatient Medications:     LATUDA 20 MG TABS tablet, , Disp: , Rfl:     cetirizine (ZYRTEC) 10 MG tablet, TAKE 1 TABLET BY MOUTH EVERY DAY FOR EAR FULLNESS., Disp: , Rfl:     vitamin D3 (CHOLECALCIFEROL) 25 MCG (1000 UT) TABS tablet, TAKE 1 TABLET BY MOUTH EVERY DAY, Disp: , Rfl:     clotrimazole (LOTRIMIN) 1 % external solution, , Disp: , Rfl: tiotropium (SPIRIVA RESPIMAT) 2.5 MCG/ACT AERS inhaler, INHALE 2 PUFFS BY MOUTH EVERY DAY, Disp: 4 g, Rfl: 5    ASPIRIN LOW DOSE 81 MG EC tablet, 1 tablet, Disp: , Rfl:     albuterol (PROVENTIL) (2.5 MG/3ML) 0.083% nebulizer solution, inhale contents of 1 vial (3ml) via nebulizer every 6 hours as needed for wheezing or shortness of breath, Disp: 360 mL, Rfl: 5    budesonide-formoterol (SYMBICORT) 160-4.5 MCG/ACT AERO, Inhale 2 puffs into the lungs 2 times daily, Disp: 10.2 g, Rfl: 2    VENTOLIN  (90 Base) MCG/ACT inhaler, INHALE 2 PUFFS BY MOUTH EVERY SIX HOURS AS NEEDED FOR wheezing or SHORTNESS OF BREATH, Disp: 18 g, Rfl: 1    furosemide (LASIX) 40 MG tablet, Take 1 tablet by mouth daily, Disp: 45 tablet, Rfl: 0    topiramate (TOPAMAX) 25 MG tablet, Take 25 mg by mouth 2 times daily, Disp: , Rfl:     buPROPion (WELLBUTRIN XL) 300 MG extended release tablet, , Disp: , Rfl:     loratadine (CLARITIN) 10 MG tablet, Take 1 tablet by mouth daily, Disp: 30 tablet, Rfl: 6    famotidine (PEPCID) 20 MG tablet, TAKE 1 TABLET BY MOUTH TWO TIMES A DAY, Disp: , Rfl:     meclizine (ANTIVERT) 25 MG tablet, Take 25 mg by mouth 3 times daily as needed PRN, Disp: , Rfl:     Cholecalciferol (VITAMIN D3) 1.25 MG (43559 UT) CAPS, Take by mouth once a week, Disp: , Rfl:     hydrOXYzine (VISTARIL) 50 MG capsule, Take 25 mg by mouth nightly, Disp: , Rfl:     metFORMIN (GLUCOPHAGE) 500 MG tablet, TAKE 1 TABLET BY MOUTH TWO TIMES A DAY WITH MORNING AND EVENING MEALS FOR PREDIABETES (Patient not taking: Reported on 11/29/2022), Disp: , Rfl:     amoxicillin-clavulanate (AUGMENTIN) 875-125 MG per tablet, TAKE 1 TABLET BY MOUTH TWICE A DAY FOR 10 DAYS, Disp: , Rfl:     predniSONE (DELTASONE) 10 MG tablet, Take 2 tabs by mouth once daily for 4 days. Then take 1 tab by mouth once daily for 4 days. Then take 1/2 tab by mouth once daily for 4 days. Then stop.  (14tabs), Disp: 14 tablet, Rfl: 0    risperiDONE (RISPERDAL) 1 MG tablet, 1 tablet, Disp: , Rfl:       Objective:     Telephone visit not able to obtain physical exam               DATA reviewed by me:   PFTs 08/08/2019 FEV1 1.58L(51%) Ratio 57% TLC 5.53L(95%)   DLCO 26.05 (102%) . PFTs 12/05/2017 FEV1 1.84L(59%) Ratio 57% TLC 7.06L(122%) DLCO 25.67 (98%)   6MW 1320 F LO2 92%. PFTs 08/14/2015 FEV1 1.80L(57%) Ratio 61% TLC 6.74L(116%) DLCO 30.47 (116%) 6MW 1200 F LO2 94%. PFTs 03/04/2013 FEV1 1.77L(60%)                  TLC 5.60L(97%)   DLCO 20.03 (93%)   6MW 1200 F LO2 94%. PFTs 01/10/2012 FEV1 1.80L(60%)                   TLC 5.92L(106%) DLCO 20.74 (96%)     IgE of 157. Elevated IgE for egg white, shrimp, cat dander, dust mite ragweed, and dog dander. She got rid of her cats. CXR 11/8/2019   No acute cardiopulmonary disease     CXR 3/14/21   Cardiomegaly with pulmonary edema   RENE faint nodule     CT chest 11/22/2022 imaging reviewed by me and showed  No PE or acute pulmonary abnormalities  Pulmonary emphysema  Stable bilateral pulmonary scarring dating back to 2009      Assessment:       Moderate obstructive airway disease secondary to COPD and Asthma   Allergic rhinitis   Chronic diastolic CHF  CKD   Pulmonary nodules. Remote granulomatous disease. Negative Bronch. Repeated CT chest 11/2009 showed parenchymal scarring within each lung which are unchanged or improved since 2007  66 pack year smoking - quit 11/2015  Declined Xolair shots       Plan:       6MW and ONPO in 2-4 hrs   Continue Symbicort, Spiriva, and Albuterol INH/Neb PRN   Medications refills today   Continue O2 on exertion if needed. Advised to titrate O2 using her pulse oximeter- target O2 sat 90-92%. She refuses vaccines due to egg allergy.  Risks, benefits and side effects were discussed with patient  Advised to continue with smoking cessation   Refused sleep apnea evaluation- can not tolerate anything on her face    Follow up in 3-6 months or sooner if needed                Lacy Lim is a 56 y.o. female evaluated via telephone on 11/29/2022. Consent:  She and/or health care decision maker is aware that that she may receive a bill for this telephone service, depending on her insurance coverage, and has provided verbal consent to proceed: Yes       Documentation:  I communicated with the patient and/or health care decision maker about: See above   Details of this discussion including any medical advice provided: See above       I Affirm this is a Patient Initiated Episode with an Established Patient who has not had a related appointment within my department in the past 7 days or scheduled within the next 24 hours.     Total Time: 11-20 minutes     Note: not billable if this call serves to triage the patient into an appointment for the relevant concern      Candy Melissa MD

## 2022-11-29 NOTE — TELEPHONE ENCOUNTER
Per OV from 11/29/22    6MW and ONPO (ONPO order faxed to [facility]) watch for results  Delma f/u 3-6 mo        Patient called with message left for patient to call back to office.

## 2022-11-30 NOTE — TELEPHONE ENCOUNTER
Peyton Matson from [facility] called stating that [facility] is not in network with pt insurance [facility] [employer]. Pt will need to choose a different DME.

## 2022-12-05 NOTE — TELEPHONE ENCOUNTER
Spoke with patient 6 mo f/u [facility] 6/12/23.  6MW [facility] 12/12/22. ONPO order faxed to [facility]. Watch for results.

## 2022-12-12 ENCOUNTER — HOSPITAL ENCOUNTER (OUTPATIENT)
Dept: PULMONOLOGY | Age: 57
Discharge: HOME | End: 2022-12-12
Payer: COMMERCIAL

## 2022-12-12 PROCEDURE — 94618 PULMONARY STRESS TESTING: CPT

## 2022-12-12 NOTE — PROCEDURES
[facility] Pulmonary Function Lab - Six Minute Walk      Test Performed on:   Room Air___X___   Oxygen at _____ lpm via N/C- continuous Oxygen at _____ lpm via N/C- OCD  Assist Device Used During Test:  None___X___ Cane________ Walker_________    Modified Fransisco's Scale  0 Nothing at all 5 Strong    0.5 Just noticeable 6 Stronger (Hard)    1 Very Light 7 Very Strong   2 Light 8 Very Very Strong   3 Moderate 9 Extremely Strong   4 Somewhat Strong 10 Maximum All out      Time SpO2 Heart Rate Respiratory Rate Dyspnea-  Modified Fransiscos Scale Fatigue- Modified Fransiscos Scale Other Symptoms   Baseline                     95% room [email or web address] 55 18 0 0      1 minute                     95% 67 20 0.5 0.5    2 minutes                     92% 72 21 1 1      3 minutes                     91% 74 22 2 2    4 minutes                     91% 71 23 3 3    5 minutes                     91% 75 24 4 4    6 minutes                     90% 73 24 4 4    Recovery x 1 minute                     93% 64 22 4 4    Recovery x 2 Minute                     96% 58 20 2 2     Number of Laps_____8____ X 120 feet + _________ additional feet = Total Distance ___960__feet   Stopped or paused before 6 minutes? No___X____ Yes ________  Total expected 6 MW distance is __1396___feet. Patient achieved ____69__% of expected distance. Pre Blood Pressure: 139/74  Post Blood Pressure:   Other symptoms at the end of exercise: SOB, little dizziness, back pain

## 2022-12-15 PROCEDURE — 94618 PULMONARY STRESS TESTING: CPT | Performed by: INTERNAL MEDICINE

## 2022-12-15 NOTE — TELEPHONE ENCOUNTER
Eli from [facility] called back and per message below, they are not in network with insurance.    It looks like orders were sent to [facility] instead. I LM at [facility] requesting they send over results.

## 2022-12-15 NOTE — TELEPHONE ENCOUNTER
Danny Lewis from [facility] called back and stated she received ONPO on 12/6/2022 and has reached out to pt with no response. Danny Lewis stated she is waiting on a callback from pt to get ONPO scheduled.

## 2022-12-15 NOTE — PROCEDURES
[address] [address]                               PULMONARY FUNCTION    PATIENT NAME: Trang Martin                      :        1965  MED REC NO:   [id number]                          ROOM:  ACCOUNT NO:   [de-identified]                           ADMIT DATE: 2022  PROVIDER:     Femi Dempsey MD    DATE OF PROCEDURE:  2022    SIX-MINUTE WALK TEST    The patient started at 95% on room air with a heart rate of 55 and  respiratory rate of 18. Dyspnea and fatigue Fransisco scores of 0. The  patient was able to complete the six-minute walk test and did not  require oxygen. IMPRESSION:  Total distance covered for six-minute walk test was 1396  feet, 69% of the predicted distance. The patient did not require oxygen  and had some shortness of breath and dizziness and back pain at the end  of the testing.         Singh Forrest MD    D: 2022 13:27:39       T: 2022 19:39:12     [email or web address]  Job#: [id number]     Doc#: [id number]    CC:

## 2022-12-22 PROBLEM — J10.1 INFLUENZA A: Status: RESOLVED | Noted: 2022-11-22 | Resolved: 2022-12-22

## 2023-01-03 ENCOUNTER — TELEPHONE (OUTPATIENT)
Dept: PULMONOLOGY | Age: 58
End: 2023-01-03

## 2023-01-03 DIAGNOSIS — J44.9 ASTHMA-COPD OVERLAP SYNDROME (HCC): Primary | ICD-10-CM

## 2023-01-06 NOTE — PROGRESS NOTES
CARDIOLOGY FOLLOW UP        Patient Name: Junior Dang  Primary Care physician: Connie Peter RN, NP    Reason for Referral/Chief Complaint: Junior Dang is a 56 y.o. patient who is referred to cardiology clinic today for evaluation and treatment of congestive heart failure with preserved ejection fraction. History of Present Illness:   Ms. Slava Medrano is a pleasant 68-year-old woman with a prior history significant for asthma, chronic obstructive pulmonary disease, bipolar disease, tobacco abuse, obesity and congestive heart failure with preserved ejection fraction. The patient last had transthoracic echocardiogram 5/6/2021 revealing 55% EF, mild left ventricular hypertrophy, right ventricular pressure overload, mild right ventricular dilation with normal function, mild right atrial dilation. Her PASP was noted elevated at 54 mmHg (RA pressure of 3 mmHg). LOV 5/16/22 at which point presentation was stable. In the interim she was admitted to the hospital 11/22/22 for respiratory failure and influenza A. She received single dose IV Lasix. Transition back to p.o. Lasix. Today she presents in a wheelchair. She states her breathing is better. Recovering from influenza. She recently did a 6 minute walk test and was told she didn't need oxygen all the time any longer. She states she was diagnosed with nocturnal hypoxemia. Trialed positive pressure ventilation in the hospital and said she could not tolerate. She will be getting a formal sleep study per sleep clinic next available. She denies palpitations. Denies chest pain or pressure. Denies progressive dyspnea-shortness of breath with exertion is stable. No worsening edema or rapid weight gain. Appears weight has declined over serial office visits and is roughly stable for November admission. She continues to smoke. The patient is compliant with medications. Cost of medications is affordable. No endorsed side effects.     Home Medications:  Were reviewed and are listed in nursing record and/or below  Prior to Admission medications    Medication Sig Start Date End Date Taking?  Authorizing Provider   ARIPiprazole (ABILIFY) 5 MG tablet  12/14/22  Yes Historical Provider, MD   oxybutynin (DITROPAN-XL) 5 MG extended release tablet TAKE 1 TABLET BY MOUTH EVERY DAY 12/26/22  Yes Historical Provider, MD   vitamin D3 (CHOLECALCIFEROL) 25 MCG (1000 UT) TABS tablet TAKE 1 TABLET BY MOUTH EVERY DAY 11/21/22  Yes Historical Provider, MD   clotrimazole (LOTRIMIN) 1 % external solution  11/23/22  Yes Historical Provider, MD   tiotropium (SPIRIVA RESPIMAT) 2.5 MCG/ACT AERS inhaler INHALE 2 PUFFS BY MOUTH EVERY DAY 11/25/22  Yes Maggie Foster MD   ASPIRIN LOW DOSE 81 MG EC tablet 1 tablet 10/24/22  Yes Historical Provider, MD   albuterol (PROVENTIL) (2.5 MG/3ML) 0.083% nebulizer solution inhale contents of 1 vial (3ml) via nebulizer every 6 hours as needed for wheezing or shortness of breath 10/26/22  Yes Adin To MD   budesonide-formoterol (SYMBICORT) 160-4.5 MCG/ACT AERO Inhale 2 puffs into the lungs 2 times daily 10/20/22  Yes Adin To MD   VENTOLIN  (90 Base) MCG/ACT inhaler INHALE 2 PUFFS BY MOUTH EVERY SIX HOURS AS NEEDED FOR wheezing or SHORTNESS OF BREATH 8/26/22  Yes Maggie Foster MD   furosemide (LASIX) 40 MG tablet Take 1 tablet by mouth daily 3/31/22 1/9/23 Yes Sánchez Victoria MD   topiramate (TOPAMAX) 25 MG tablet Take 25 mg by mouth 2 times daily   Yes Historical Provider, MD   buPROPion (WELLBUTRIN XL) 300 MG extended release tablet  4/29/21  Yes Historical Provider, MD   loratadine (CLARITIN) 10 MG tablet Take 1 tablet by mouth daily 12/17/20  Yes Adin To MD   famotidine (PEPCID) 20 MG tablet TAKE 1 TABLET BY MOUTH TWO TIMES A DAY 11/11/20  Yes Historical Provider, MD   meclizine (ANTIVERT) 25 MG tablet Take 25 mg by mouth 3 times daily as needed PRN   Yes Historical Provider, MD   vitamin D3 (CHOLECALCIFEROL) 25 MCG (1000 UT) TABS tablet Take by mouth daily   Yes Historical Provider, MD   metFORMIN (GLUCOPHAGE) 500 MG tablet TAKE 1 TABLET BY MOUTH TWO TIMES A DAY WITH MORNING AND EVENING MEALS FOR PREDIABETES  Patient not taking: No sig reported 11/21/22   Historical Provider, MD   risperiDONE (RISPERDAL) 1 MG tablet 1 tablet 10/6/22   Historical Provider, MD        CURRENT Medications:  No current facility-administered medications for this visit. Allergies:  Acetaminophen-codeine, Darvocet [propoxyphene n-acetaminophen], Eggs or egg-derived products, Percocet [oxycodone-acetaminophen], Shrimp flavor, and Vicodin [hydrocodone-acetaminophen]     Review of Systems:   A 14 point review of symptoms completed. Pertinent positives identified in the HPI, all other review of symptoms negative as below. Objective:     Vitals:    01/09/23 1246   BP: 100/68   Pulse: 72   SpO2: 93%   Weight: 272 lb (123.4 kg)   Height: 5' 7\" (1.702 m)       Wt Readings from Last 3 Encounters:   01/09/23 272 lb (123.4 kg)   11/25/22 272 lb 11.2 oz (123.7 kg)   05/16/22 293 lb (132.9 kg)          PHYSICAL EXAM:    General:  Alert, cooperative, no distress, appears stated age   Head:  Normocephalic, atraumatic   Eyes:  Conjunctiva/corneas clear, anicteric sclerae    Nose: Nares normal, no drainage or sinus tenderness   Throat: No abnormalities of the lips, oral mucosa or tongue. Neck: Trachea midline. Neck supple with no lymphadenopathy, thyroid not enlarged, symmetric, no tenderness/mass/nodules   Lungs:   End Expiratory Wheezes bilaterally , no rales, no respiratory distress. Chest Wall:  No deformity or tenderness to palpation   Heart:  Regular rate and rhythm, normal S1, normal S2, 2/6 murmur over the left sternal border increased with inspiration,, no rub, no S3/S4, PMI non-palpable   Abdomen:   Obese, soft, non-tender, with normoactive bowel sounds. No masses, no hepatosplenomegaly.     Extremities: No cyanosis, clubbing, no pitting edema, venous stasis changes bilaterally   Vascular: 2+ radial, decreased dorsalis pedis and posterior tibial pulses bilaterally. Brisk carotid upstrokes without carotid bruit. Skin: As above   Pysch: Euthymic mood, appropriate affect   Neurologic: Oriented to person, place and time. No slurred speech or facial asymmetry. No motor or sensory deficits on gross examination. Labs:   CBC:   Lab Results   Component Value Date/Time    WBC 4.2 11/23/2022 04:53 AM    RBC 4.71 11/23/2022 04:53 AM    HGB 15.8 11/23/2022 04:53 AM    HCT 46.6 11/23/2022 04:53 AM    MCV 98.8 11/23/2022 04:53 AM    RDW 14.9 11/23/2022 04:53 AM     11/23/2022 04:53 AM     CMP:  Lab Results   Component Value Date/Time     11/23/2022 04:53 AM    K 4.8 11/23/2022 04:53 AM    K 5.0 11/22/2022 08:27 AM    CL 97 11/23/2022 04:53 AM    CO2 35 11/23/2022 04:53 AM    BUN 21 11/23/2022 04:53 AM    CREATININE 1.0 11/23/2022 04:53 AM    GFRAA 57 08/13/2021 02:06 PM    GFRAA >60 09/10/2010 11:27 PM    AGRATIO 1.4 11/22/2022 08:27 AM    LABGLOM >60 11/23/2022 04:53 AM    GLUCOSE 112 11/23/2022 04:53 AM    PROT 7.0 11/22/2022 08:27 AM    PROT 7.8 09/10/2010 11:27 PM    CALCIUM 9.3 11/23/2022 04:53 AM    BILITOT 0.3 11/22/2022 08:27 AM    ALKPHOS 72 11/22/2022 08:27 AM    AST 35 11/22/2022 08:27 AM    ALT 15 11/22/2022 08:27 AM     PT/INR:  No results found for: PTINR  HgBA1c:No results found for: LABA1C  Lab Results   Component Value Date    TROPONINI <0.01 11/22/2022     No results found for: CHOL  No results found for: TRIG  No results found for: HDL  No results found for: LDLCHOLESTEROL, LDLCALC  No results found for: LABVLDL, VLDL  No results found for: CHOLHDLRATIO     Pro BNP 08/13/21 663      Cardiac Data:      Echo: 5/6/2021   Summary   Technically difficult examination. Normal systolic function with an estimated ejection fraction of 55%. Mild concentric left ventricular hypertrophy.    Flattened in systole consistent with right ventricular pressure overload. Normal left ventricular diastolic filling pressure. The right ventricle appears mildly dilated with normal function. The right atrium is mildly dilated. MIld pulmonic and tricuspid regurgitation. Systolic pulmonary artery pressure (SPAP) estimated at 54 mmHg (right atrial   pressure 3 mmHg), consistent with mild pulmonary hypertension. Impression and Plan:     1. Chronic congestive heart failure, with preserved ejection fraction  2. Shortness of breath, multifactorial and chronic, stable  3. Moderate pulmonary hypertension  4. Chronic obstructive pulmonary disease  5. Asthma   6. Persistent smoker  7. Obesity with BMI 43  8. Suspected sleep apnea     Patient Active Problem List   Diagnosis Code    Edema of both legs R60.0    Shortness of breath A52.72    Acute diastolic congestive heart failure (HCC) I50.31    COPD exacerbation ([facility]) J44.1    Tobacco abuse Z72.0    Acute respiratory failure with hypoxia and hypercarbia ([facility]) J96.01, J96.02    Hypoxia R09.02    Chronic diastolic CHF (congestive heart failure) ([facility] I50.32     PLAN:  1. I Strongly advised complete smoking cessation. Resources given to assist quitting including the followin-800-QUIT NOW. 2. NO change in cardiac medications. Continue current regimen. Lasix to maintain euvolemia. 3. Fasting Labs - Lipids, A1C , CMP, BNP next available  4. We will call you with the results; consider Jardiance pending labs      Follow up in 6 months    Scribe's attestation: This note was scribed in the presence of Dr. Arabella Bowles MD by Leonila Colon RN    The scribes documentation has been prepared under my direction and personally reviewed by me in its entirety. I confirm that the note above accurately reflects all work, treatment, procedures, and medical decision making performed by me.   Debra Garcia MD, personally performed the services described in this documentation as scribed by Leonila Colon RN in my presence, and it is both accurate and complete to the best of our ability. I will address the patient's cardiac risk factors and adjusted pharmacologic treatment as needed. In addition, I have reinforced the need for patient directed risk factor modification. All questions and concerns were addressed to the patient/family. Alternatives to my treatment were discussed. Thank you for allowing us to participate in the care of Gerber Thompson. Please call me with any questions [phone].     Ketty Diaz MD, [facility]  Cardiovascular Disease  [facility]  [phone] [facility]  [phone] [address] [place] [address]  1/9/2023 1:00 PM

## 2023-01-09 ENCOUNTER — HOSPITAL ENCOUNTER (OUTPATIENT)
Age: 58
Discharge: HOME | End: 2023-01-09
Payer: COMMERCIAL

## 2023-01-09 ENCOUNTER — OFFICE VISIT (OUTPATIENT)
Dept: CARDIOLOGY CLINIC | Age: 58
End: 2023-01-09
Payer: COMMERCIAL

## 2023-01-09 VITALS
SYSTOLIC BLOOD PRESSURE: 100 MMHG | HEIGHT: 67 IN | BODY MASS INDEX: 42.69 KG/M2 | WEIGHT: 272 LBS | OXYGEN SATURATION: 93 % | HEART RATE: 72 BPM | DIASTOLIC BLOOD PRESSURE: 68 MMHG

## 2023-01-09 DIAGNOSIS — I50.32 CHRONIC DIASTOLIC CHF (CONGESTIVE HEART FAILURE) (HCC): ICD-10-CM

## 2023-01-09 DIAGNOSIS — Z87.891 SMOKING HISTORY: ICD-10-CM

## 2023-01-09 DIAGNOSIS — R06.02 SHORTNESS OF BREATH: Primary | ICD-10-CM

## 2023-01-09 DIAGNOSIS — R06.02 SHORTNESS OF BREATH: ICD-10-CM

## 2023-01-09 LAB
A/G RATIO: 1.4 (ref 1.1–2.2)
ALBUMIN SERPL-MCNC: 3.8 G/DL (ref 3.4–5)
ALP BLD-CCNC: 65 U/L (ref 40–129)
ALT SERPL-CCNC: 9 U/L (ref 10–40)
ANION GAP SERPL CALCULATED.3IONS-SCNC: 10 MMOL/L (ref 3–16)
AST SERPL-CCNC: 13 U/L (ref 15–37)
BILIRUB SERPL-MCNC: 0.4 MG/DL (ref 0–1)
BUN BLDV-MCNC: 15 MG/DL (ref 7–20)
CALCIUM SERPL-MCNC: 9.3 MG/DL (ref 8.3–10.6)
CHLORIDE BLD-SCNC: 98 MMOL/L (ref 99–110)
CHOLESTEROL, TOTAL: 184 MG/DL (ref 0–199)
CO2: 32 MMOL/L (ref 21–32)
CREAT SERPL-MCNC: 0.9 MG/DL (ref 0.6–1.1)
GFR SERPL CREATININE-BSD FRML MDRD: >60 ML/MIN/{1.73_M2}
GLUCOSE BLD-MCNC: 115 MG/DL (ref 70–99)
HDLC SERPL-MCNC: 46 MG/DL (ref 40–60)
LDL CHOLESTEROL CALCULATED: 113 MG/DL
POTASSIUM SERPL-SCNC: 4.5 MMOL/L (ref 3.5–5.1)
PRO-BNP: 1761 PG/ML (ref 0–124)
SODIUM BLD-SCNC: 140 MMOL/L (ref 136–145)
TOTAL PROTEIN: 6.5 G/DL (ref 6.4–8.2)
TRIGL SERPL-MCNC: 127 MG/DL (ref 0–150)
VLDLC SERPL CALC-MCNC: 25 MG/DL

## 2023-01-09 PROCEDURE — 83036 HEMOGLOBIN GLYCOSYLATED A1C: CPT

## 2023-01-09 PROCEDURE — 83880 ASSAY OF NATRIURETIC PEPTIDE: CPT

## 2023-01-09 PROCEDURE — G8484 FLU IMMUNIZE NO ADMIN: HCPCS | Performed by: INTERNAL MEDICINE

## 2023-01-09 PROCEDURE — 99214 OFFICE O/P EST MOD 30 MIN: CPT | Performed by: INTERNAL MEDICINE

## 2023-01-09 PROCEDURE — G8427 DOCREV CUR MEDS BY ELIG CLIN: HCPCS | Performed by: INTERNAL MEDICINE

## 2023-01-09 PROCEDURE — 1036F TOBACCO NON-USER: CPT | Performed by: INTERNAL MEDICINE

## 2023-01-09 PROCEDURE — G8417 CALC BMI ABV UP PARAM F/U: HCPCS | Performed by: INTERNAL MEDICINE

## 2023-01-09 PROCEDURE — 36415 COLL VENOUS BLD VENIPUNCTURE: CPT

## 2023-01-09 PROCEDURE — 80061 LIPID PANEL: CPT

## 2023-01-09 PROCEDURE — 80053 COMPREHEN METABOLIC PANEL: CPT

## 2023-01-09 PROCEDURE — 3017F COLORECTAL CA SCREEN DOC REV: CPT | Performed by: INTERNAL MEDICINE

## 2023-01-09 RX ORDER — ARIPIPRAZOLE 5 MG/1
TABLET ORAL
COMMUNITY
Start: 2022-12-14

## 2023-01-09 RX ORDER — OXYBUTYNIN CHLORIDE 5 MG/1
TABLET, EXTENDED RELEASE ORAL
COMMUNITY
Start: 2022-12-26

## 2023-01-09 NOTE — PATIENT INSTRUCTIONS
PLAN:  1. Highly recommend you stop smoking  2. NO change in cardiac medications. Continue current regimen (Aspirin and Lasix)  3. Fasting Labs - Lipids, A1C , CMP, BNP  4.  We will call you with the results     Follow up in 6 months

## 2023-01-10 ENCOUNTER — TELEPHONE (OUTPATIENT)
Dept: CARDIOLOGY CLINIC | Age: 58
End: 2023-01-10

## 2023-01-10 DIAGNOSIS — Z72.0 TOBACCO ABUSE: ICD-10-CM

## 2023-01-10 DIAGNOSIS — I50.9 CONGESTIVE HEART FAILURE, UNSPECIFIED HF CHRONICITY, UNSPECIFIED HEART FAILURE TYPE (HCC): ICD-10-CM

## 2023-01-10 DIAGNOSIS — R73.03 PRE-DIABETES: Primary | ICD-10-CM

## 2023-01-10 LAB
ESTIMATED AVERAGE GLUCOSE: 119.8 MG/DL
HBA1C MFR BLD: 5.8 %

## 2023-01-10 NOTE — TELEPHONE ENCOUNTER
----- Message from Rosy David MD sent at 1/10/2023 11:20 AM EST -----  Labs reviewed, A1c well controlled at 5.8.  proBNP decreased markedly over the prior 1 month getting good volume status. Electrolytes and kidney function stable. Recommend start Jardiance 10 mg once daily for heart failure indication. Please educate on risk of urinary tract infection.      Also recommend coronary artery calcium score for further evaluation and risk stratification

## 2023-01-10 NOTE — TELEPHONE ENCOUNTER
Attempted to reach patient. No answer. Left VM to call office to discuss RJM results and instructions. Order for CT Calcium score will need to provide central scheduling information if agreeable. Medication pending will need to route to [facility] after verifying patient agreeable and pharmacy.

## 2023-01-11 NOTE — TELEPHONE ENCOUNTER
Signed Jardiance.  With her shortness of breath, HFpEF histories, smoking history, thought it appropriate to assess her coronary arteries as we may be missing underlying blockage.  CT scan is 1 way to get it this first stress test.  Typically both may be difficult with claustrophobia.  We could consider stress echo, and likely dobutamine stress echo if unable to walk on treadmill.  This is a test that would give medication with will make her heart pump faster and stronger while she is lying still and we take echo pictures - not enclosed. Willing to do this?     GABINO

## 2023-01-11 NOTE — TELEPHONE ENCOUNTER
Called and spoke with pt, he is agreeable on taking the jardiance, it has been pended for sign off, but pt is unsure of CT calcium score testing, he stated he does not like tight spaces and wont do it.  I gave him the number to central scheduling if he changes his mind

## 2023-01-13 NOTE — TELEPHONE ENCOUNTER
Pt returned call to office. Relayed RJM message regarding recommended testing. She stated that she would think about doing the dobutamine echo stress test and will call the office back to let us know if she would like to proceed.

## 2023-01-24 RX ORDER — BUDESONIDE AND FORMOTEROL FUMARATE DIHYDRATE 160; 4.5 UG/1; UG/1
AEROSOL RESPIRATORY (INHALATION)
Qty: 10.2 G | Refills: 5 | Status: SHIPPED | OUTPATIENT
Start: 2023-01-24

## 2023-04-21 DIAGNOSIS — J44.9 CHRONIC OBSTRUCTIVE PULMONARY DISEASE, UNSPECIFIED COPD TYPE (HCC): ICD-10-CM

## 2023-04-21 RX ORDER — ALBUTEROL SULFATE 2.5 MG/3ML
SOLUTION RESPIRATORY (INHALATION)
Qty: 360 ML | Refills: 5 | Status: SHIPPED | OUTPATIENT
Start: 2023-04-21

## 2023-05-01 ENCOUNTER — TELEPHONE (OUTPATIENT)
Dept: PULMONOLOGY | Age: 58
End: 2023-05-01

## 2023-05-01 DIAGNOSIS — J44.9 CHRONIC OBSTRUCTIVE PULMONARY DISEASE, UNSPECIFIED COPD TYPE (HCC): Primary | ICD-10-CM

## 2023-05-01 NOTE — TELEPHONE ENCOUNTER
Order entered and signed and faxed to [facility] [employer]. I also sent email to [facility] [employer] asking them to contact pt ASAP since her nebulizer is broken. Pt advised they are open until 5:00 today.

## 2023-05-01 NOTE — TELEPHONE ENCOUNTER
Pt calling asking for a new order for nebulizer as hers is broke. She spoke with NIKI (David) pt is not due for new machine but informed Patient the new order will have to state current nebulizer is broke. Okay for order? Pt would like a CB once order has been faxed. LOV: 11/29/23    Assessment:       Moderate obstructive airway disease secondary to COPD and Asthma   Allergic rhinitis   Chronic diastolic CHF  CKD   Pulmonary nodules. Remote granulomatous disease. Negative Bronch. Repeated CT chest 11/2009 showed parenchymal scarring within each lung which are unchanged or improved since 2007  66 pack year smoking - quit 11/2015  Declined Xolair shots                 Plan:       6MW and ONPO in 2-4 hrs   Continue Symbicort, Spiriva, and Albuterol INH/Neb PRN   Medications refills today   Continue O2 on exertion if needed. Advised to titrate O2 using her pulse oximeter- target O2 sat 90-92%. She refuses vaccines due to egg allergy.  Risks, benefits and side effects were discussed with patient  Advised to continue with smoking cessation   Refused sleep apnea evaluation- can not tolerate anything on her face    Follow up in 3-6 months or sooner if needed

## 2023-05-22 DIAGNOSIS — R73.03 PRE-DIABETES: ICD-10-CM

## 2023-05-22 RX ORDER — EMPAGLIFLOZIN 10 MG/1
TABLET, FILM COATED ORAL
Qty: 90 TABLET | Refills: 3 | Status: SHIPPED | OUTPATIENT
Start: 2023-05-22

## 2023-07-10 ENCOUNTER — OFFICE VISIT (OUTPATIENT)
Dept: CARDIOLOGY CLINIC | Age: 58
End: 2023-07-10
Payer: COMMERCIAL

## 2023-07-10 VITALS
DIASTOLIC BLOOD PRESSURE: 62 MMHG | WEIGHT: 263.5 LBS | HEART RATE: 62 BPM | BODY MASS INDEX: 41.36 KG/M2 | HEIGHT: 67 IN | OXYGEN SATURATION: 91 % | SYSTOLIC BLOOD PRESSURE: 114 MMHG

## 2023-07-10 DIAGNOSIS — I50.32 CHRONIC HEART FAILURE WITH PRESERVED EJECTION FRACTION (HCC): Primary | ICD-10-CM

## 2023-07-10 DIAGNOSIS — R06.02 SHORTNESS OF BREATH: ICD-10-CM

## 2023-07-10 DIAGNOSIS — Z72.0 TOBACCO USE: ICD-10-CM

## 2023-07-10 PROCEDURE — 3017F COLORECTAL CA SCREEN DOC REV: CPT | Performed by: INTERNAL MEDICINE

## 2023-07-10 PROCEDURE — G8427 DOCREV CUR MEDS BY ELIG CLIN: HCPCS | Performed by: INTERNAL MEDICINE

## 2023-07-10 PROCEDURE — G8417 CALC BMI ABV UP PARAM F/U: HCPCS | Performed by: INTERNAL MEDICINE

## 2023-07-10 PROCEDURE — 99214 OFFICE O/P EST MOD 30 MIN: CPT | Performed by: INTERNAL MEDICINE

## 2023-07-10 PROCEDURE — 1036F TOBACCO NON-USER: CPT | Performed by: INTERNAL MEDICINE

## 2023-07-10 NOTE — PROGRESS NOTES
CARDIOLOGY FOLLOW UP        Patient Name: Lesley Mckeon  Primary Care physician: Candy Haney RN, NP    Reason for Referral/Chief Complaint: Lesley Mckeon is a 56 y.o. patient who presents to cardiology clinic today for evaluation and treatment of congestive heart failure with preserved ejection fraction. History of Present Illness:   Ms. Henry Valentin is a pleasant 22-year-old woman with a prior history significant for asthma, chronic obstructive pulmonary disease, bipolar disease, tobacco abuse, obesity and congestive heart failure with preserved ejection fraction. The patient last had transthoracic echocardiogram 5/6/2021 revealing 55% EF, mild left ventricular hypertrophy, right ventricular pressure overload, mild right ventricular dilation with normal function, mild right atrial dilation. Her PASP was noted elevated at 54 mmHg (RA pressure of 3 mmHg). LOV 1/9/23 Stated her breathing is better. Recovering from influenza. Denied chest pain or pressure. Denied progressive dyspnea-shortness of breath with exertion is stable. No worsening edema or rapid weight gain. Appears weight has declined over serial office visits and is roughly stable for November admission. She continued to smoke. Patient started Jardiance 10 mg daily. Today she presents in a wheelchair and with family. She reports feeling ok. Continues to lose weight - another 9 lbs since last visit January. States she is able to walk more and go up and down stairs better with improved breathing/tolerance. Denies paroxysmal nocturnal dyspnea, orthopnea, increasing lower extremity edema. States occasional vertigo managed with meclizine. Denies chest pain, palpitations, near-syncope or carmelina syncope. She continues to follow with pulmonology. Occasional missed doses medications. Continues to smoke.      Home Medications:  Were reviewed and are listed in nursing record and/or below  Prior to Admission medications    Medication

## 2023-10-23 DIAGNOSIS — J44.9 ASTHMA-COPD OVERLAP SYNDROME: ICD-10-CM

## 2023-10-24 ENCOUNTER — TELEPHONE (OUTPATIENT)
Dept: PULMONOLOGY | Age: 58
End: 2023-10-24

## 2023-10-24 RX ORDER — MONTELUKAST SODIUM 10 MG/1
10 TABLET ORAL DAILY
Qty: 30 TABLET | Refills: 3 | Status: SHIPPED | OUTPATIENT
Start: 2023-10-24

## 2023-10-24 NOTE — TELEPHONE ENCOUNTER
Submitted PA for [employer]  Via [facility] (Key: [id number] STATUS: PENDING. Follow up done daily; if no response in three days we will refax for status check. If another three days goes by with no response we will call the insurance for status.

## 2023-10-26 RX ORDER — ALBUTEROL SULFATE 2.5 MG/3ML
SOLUTION RESPIRATORY (INHALATION)
Qty: 375 ML | Refills: 5 | Status: SHIPPED | OUTPATIENT
Start: 2023-10-26

## 2023-11-20 DIAGNOSIS — J44.9 CHRONIC OBSTRUCTIVE PULMONARY DISEASE, UNSPECIFIED COPD TYPE (HCC): ICD-10-CM

## 2023-11-20 DIAGNOSIS — J45.40 MODERATE PERSISTENT ASTHMA WITHOUT COMPLICATION: ICD-10-CM

## 2023-12-02 ENCOUNTER — TELEPHONE (OUTPATIENT)
Dept: PULMONOLOGY | Age: 58
End: 2023-12-02

## 2023-12-02 NOTE — TELEPHONE ENCOUNTER
Called trc to r/s appointment on 12/19/23 due to Dr. Dirk Coates not being here. . left detailed message

## 2023-12-11 DIAGNOSIS — J44.9 ASTHMA-COPD OVERLAP SYNDROME: ICD-10-CM

## 2023-12-11 DIAGNOSIS — J45.40 MODERATE PERSISTENT ASTHMA WITHOUT COMPLICATION: ICD-10-CM

## 2023-12-11 DIAGNOSIS — J44.9 CHRONIC OBSTRUCTIVE PULMONARY DISEASE, UNSPECIFIED COPD TYPE (HCC): ICD-10-CM

## 2023-12-11 RX ORDER — ALBUTEROL SULFATE 90 UG/1
AEROSOL, METERED RESPIRATORY (INHALATION)
Qty: 18 G | Refills: 2 | Status: SHIPPED | OUTPATIENT
Start: 2023-12-11

## 2023-12-11 RX ORDER — MONTELUKAST SODIUM 10 MG/1
10 TABLET ORAL DAILY
Qty: 30 TABLET | Refills: 2 | Status: SHIPPED | OUTPATIENT
Start: 2023-12-11

## 2023-12-11 NOTE — TELEPHONE ENCOUNTER
Pt is stated that she has no refills remaining for her medications and is requesting that they be sent to her pharmacy. Please advise.

## 2024-01-24 DIAGNOSIS — J44.9 ASTHMA-COPD OVERLAP SYNDROME: ICD-10-CM

## 2024-01-29 ENCOUNTER — TELEPHONE (OUTPATIENT)
Dept: PULMONOLOGY | Age: 59
End: 2024-01-29

## 2024-01-29 RX ORDER — BUDESONIDE AND FORMOTEROL FUMARATE DIHYDRATE 160; 4.5 UG/1; UG/1
AEROSOL RESPIRATORY (INHALATION)
Qty: 10.2 G | Refills: 2 | Status: SHIPPED | OUTPATIENT
Start: 2024-01-29

## 2024-01-29 NOTE — TELEPHONE ENCOUNTER
Submitted PA for BUDESONIDE  Via [facility] [id number] STATUS: PENDING.    Follow up done daily; if no decision with in three days we will refax.  If another three days goes by with no decision will call the insurance for status.

## 2024-01-30 NOTE — TELEPHONE ENCOUNTER
Denial states that brand Symbicort is covered.  Called pharmacy to clarify if pt was able to fill.  Per Pharmacy, refill is too soon right now, pt can't fill until 2/3/24. Pharmacy will run through for pt.

## 2024-02-06 ENCOUNTER — TELEMEDICINE (OUTPATIENT)
Dept: PULMONOLOGY | Age: 59
End: 2024-02-06
Payer: COMMERCIAL

## 2024-02-06 DIAGNOSIS — F40.240 CLAUSTROPHOBIA: ICD-10-CM

## 2024-02-06 DIAGNOSIS — Z87.891 FORMER SMOKER: Primary | ICD-10-CM

## 2024-02-06 DIAGNOSIS — J44.89 ASTHMA-COPD OVERLAP SYNDROME: ICD-10-CM

## 2024-02-06 DIAGNOSIS — J45.40 MODERATE PERSISTENT ASTHMA WITHOUT COMPLICATION: ICD-10-CM

## 2024-02-06 DIAGNOSIS — J44.9 CHRONIC OBSTRUCTIVE PULMONARY DISEASE, UNSPECIFIED COPD TYPE (HCC): ICD-10-CM

## 2024-02-06 PROCEDURE — 3017F COLORECTAL CA SCREEN DOC REV: CPT | Performed by: INTERNAL MEDICINE

## 2024-02-06 PROCEDURE — G8427 DOCREV CUR MEDS BY ELIG CLIN: HCPCS | Performed by: INTERNAL MEDICINE

## 2024-02-06 PROCEDURE — 1036F TOBACCO NON-USER: CPT | Performed by: INTERNAL MEDICINE

## 2024-02-06 PROCEDURE — 99214 OFFICE O/P EST MOD 30 MIN: CPT | Performed by: INTERNAL MEDICINE

## 2024-02-06 PROCEDURE — 3023F SPIROM DOC REV: CPT | Performed by: INTERNAL MEDICINE

## 2024-02-06 PROCEDURE — G8484 FLU IMMUNIZE NO ADMIN: HCPCS | Performed by: INTERNAL MEDICINE

## 2024-02-06 PROCEDURE — G8417 CALC BMI ABV UP PARAM F/U: HCPCS | Performed by: INTERNAL MEDICINE

## 2024-02-06 RX ORDER — LORAZEPAM 1 MG/1
1 TABLET ORAL DAILY
Qty: 1 TABLET | Refills: 0 | Status: SHIPPED | OUTPATIENT
Start: 2024-02-06 | End: 2024-02-07

## 2024-02-06 RX ORDER — ALBUTEROL SULFATE 2.5 MG/3ML
SOLUTION RESPIRATORY (INHALATION)
Qty: 375 ML | Refills: 5 | Status: SHIPPED | OUTPATIENT
Start: 2024-02-06

## 2024-02-06 RX ORDER — BUDESONIDE AND FORMOTEROL FUMARATE DIHYDRATE 160; 4.5 UG/1; UG/1
AEROSOL RESPIRATORY (INHALATION)
Qty: 10.2 G | Refills: 2 | Status: SHIPPED | OUTPATIENT
Start: 2024-02-06

## 2024-02-06 RX ORDER — ALBUTEROL SULFATE 90 UG/1
AEROSOL, METERED RESPIRATORY (INHALATION)
Qty: 18 G | Refills: 2 | Status: SHIPPED | OUTPATIENT
Start: 2024-02-06

## 2024-02-06 RX ORDER — MONTELUKAST SODIUM 10 MG/1
10 TABLET ORAL DAILY
Qty: 30 TABLET | Refills: 2 | Status: SHIPPED | OUTPATIENT
Start: 2024-02-06

## 2024-02-06 NOTE — PROGRESS NOTES
[facility]                                                            Outpatient Follow Up Note    TELEHEALTH EVALUATION: Service performed was Audio/Visual (During COVID-19 public health emergency) and not a face-to-face visit         CHIEF COMPLAINT: Follow up COPD      HPI:   Doing okay  Some cough with chest congestion with the change in weather, not too bad   Uses Albuterol 3 times/week  No smoking   Sat 97% RA   Staying active     Past Medical History:   Diagnosis Date    Allergic rhinitis     Anxiety     Asthma     Bipolar disorder, mixed (HCC)     COPD (chronic obstructive pulmonary disease) [facility])     COPD (chronic obstructive pulmonary disease) ([facility]) 5/7/2021    Depression     Eczema     Pulmonary nodule     Tobacco abuse        Past Surgical History:        Procedure Laterality Date    BRONCHOSCOPY      TONSILLECTOMY         Allergies:  is allergic to shrimp (diagnostic), acetaminophen-codeine, darvocet [propoxyphene n-acetaminophen], eggs or egg-derived products, percocet [oxycodone-acetaminophen], shrimp flavor, and vicodin [hydrocodone-acetaminophen].  Social History:    TOBACCO:   reports that she quit smoking about 14 months ago. Her smoking use included cigarettes. She started smoking about 35 years ago. She has a 51.0 pack-year smoking history. She has never used smokeless tobacco.  ETOH:   reports no history of alcohol use.      Family History:       Problem Relation Age of Onset    Diabetes Mother     Hypertension Father     Asthma Neg Hx     Cancer Neg Hx     Emphysema Neg Hx     Heart Failure Neg Hx        Current Medications:    Current Outpatient Medications:     SYMBICORT 160-4.5 MCG/ACT AERO, INHALE 2 PUFFS BY MOUTH 2 TIMES DAILY, Disp: 10.2 g, Rfl: 2    albuterol sulfate HFA (VENTOLIN HFA) 108 (90 Base) MCG/ACT inhaler, INHALE 2 PUFFS BY MOUTH EVERY SIX HOURS AS NEEDED FOR wheezing or SHORTNESS OF BREATH, Disp: 18

## 2024-02-13 ENCOUNTER — TELEPHONE (OUTPATIENT)
Dept: PULMONOLOGY | Age: 59
End: 2024-02-13

## 2024-02-20 ENCOUNTER — TELEPHONE (OUTPATIENT)
Dept: PULMONOLOGY | Age: 59
End: 2024-02-20

## 2024-02-20 NOTE — TELEPHONE ENCOUNTER
Pt called and requested her meds have a 6 mo refill to last til her next appt. Also had a request to have her allergen profile she had done back in 1/9/2012 printed with explantation of what the abnormal's are and mailed to her

## 2024-02-20 NOTE — TELEPHONE ENCOUNTER
Pt Teofilo today states she received all the calls and stated she will call and schedule the needed CT closer to her f/u appt in August

## 2024-02-21 ENCOUNTER — HOSPITAL ENCOUNTER (OUTPATIENT)
Dept: WOMENS IMAGING | Age: 59
Discharge: HOME | End: 2024-02-21
Payer: COMMERCIAL

## 2024-02-21 VITALS — BODY MASS INDEX: 41.28 KG/M2 | WEIGHT: 263 LBS | HEIGHT: 67 IN

## 2024-02-21 DIAGNOSIS — J44.89 ASTHMA-COPD OVERLAP SYNDROME: ICD-10-CM

## 2024-02-21 DIAGNOSIS — Z12.31 ENCOUNTER FOR SCREENING MAMMOGRAM FOR MALIGNANT NEOPLASM OF BREAST: ICD-10-CM

## 2024-02-21 PROCEDURE — 77063 BREAST TOMOSYNTHESIS BI: CPT

## 2024-02-22 RX ORDER — BUDESONIDE AND FORMOTEROL FUMARATE DIHYDRATE 160; 4.5 UG/1; UG/1
AEROSOL RESPIRATORY (INHALATION)
Qty: 10.2 G | Refills: 5 | Status: SHIPPED | OUTPATIENT
Start: 2024-02-22

## 2024-02-22 RX ORDER — ALBUTEROL SULFATE 90 UG/1
AEROSOL, METERED RESPIRATORY (INHALATION)
Qty: 18 G | Refills: 5 | Status: SHIPPED | OUTPATIENT
Start: 2024-02-22

## 2024-02-22 RX ORDER — FLUTICASONE PROPIONATE 50 MCG
SPRAY, SUSPENSION (ML) NASAL
Qty: 16 G | Refills: 5 | Status: SHIPPED | OUTPATIENT
Start: 2024-02-22

## 2024-02-22 RX ORDER — ALBUTEROL SULFATE 2.5 MG/3ML
SOLUTION RESPIRATORY (INHALATION)
Qty: 375 ML | Refills: 5 | Status: SHIPPED | OUTPATIENT
Start: 2024-02-22

## 2024-02-22 RX ORDER — LORATADINE 10 MG/1
10 TABLET ORAL DAILY
Qty: 30 TABLET | Refills: 6 | Status: SHIPPED | OUTPATIENT
Start: 2024-02-22

## 2024-04-26 NOTE — ED NOTES
Procedure Instructions    Name: Farrah Stewart   1965      MRN: [id number]  Surgeon: Yuli Hernandez MD   Procedure:   Right partial mastectomy, right sentinel lymph node biopsy and left crescent mastopexy   Date of procedure: 5/13/2024      Arrival time:  06:45 AM  Procedure Time: 08:45 AM   Procedure times may change due to cancellations and emergencies. If your arrival time changes, you will be notified.    Hospital/Facility: [facility] ([facility]) - [address] [place] [address]. Use main hospital entrance #1, take elevators to 2nd floor. Turn left off the elevators, turn right at first hallway. Surgery registration desk is located just past the restrooms.      Please review these instructions and call our office with any questions [phone]    THINGS TO PLAN FOR NOW   Purchase or  the following:   A new toothbrush  1 - 8 oz. bottle chlorhexidine gluconate soap (you can  a bottle at our office or it is available at any pharmacy)  Mariajose- Placement  Test Appointment Date: 5/2/2024 at :00 PM  Location: [facility] [address] [place] [address], [place], [place]    Medication Instructions     On the morning of your procedure, take any other medications not mentioned below with a small sip of water.  metformin and metformin containing products (Glucophage) DO NOT TAKE on the day of your procedure.   hydrochlorothazide (hydrochlorothiazide): DO NOT TAKE the morning of your procedure  metoprolol (Lopressor, Lopressor HCT or Toprol): CONTINUE to take as prescribed.   lisinopril with or without hydrochlorothiazide (Prinivil, Prinzide, Zestril, Zestoretic): DO NOT TAKE on the day of your procedure.     STOP TAKING aspirin 7 days before your procedure.      ibuprofen (Advil, Motrin or Nuprin): DO NOT take these medications for 24 hours before your procedure.    naproxen (Aleve, Anaprox, Naprosyn): DO NOT take these medications for 96 hours (4 days) before  Dr. Ama Hinojosa returned call and spoke with Dr. Jaiden Rodriguez.      Angella Lemus  11/22/22 9732 your procedure.     MAY TAKE acetaminophen (Tylenol) until midnight the night before your procedure.    If you use an inhaler, take as directed AND bring with you to the hospital.     STOP TAKING over the counter supplements/vitamins, fish oil and herbs 2 weeks before your procedure. You may continue any prescribed potassium up until the midnight prior to your procedure.      You MAY CONTINUE iron supplements but DO NOT TAKE on the day of your procedure.     Marijuana, THC or CBD containing products should be held for 3 days prior to your procedure.  Smoked/inhaled products should be held for 2 weeks prior to your procedure.    If you start any new medications contact our office for instructions.      Diet Instructions     DO NOT eat solid food after 12:00 midnight on the night before your procedure. This includes all gum, mints or candies.  DO NOT use chewing tobacco.  You may have clear liquids until 12:00 midnight on the night before your procedure.  Drink as much fluid as possible until this time to prevent dehydration, unless your provider has advised you to limit fluids.   Clear liquids include: water, plain jello with no solids, popsicles with no yogurt or fruit pulp. Chicken or beef bouillon, clear juices like apple or cranberry (not orange), clear soda (Sprite, 7-up or ginger ale), coffee or tea, no cream.       Bathing Instructions     Evening before your procedure:  Using your usual soap and shampoo, bathe or shower and wash from head to toes.    Step away from running water, and use a clean washcloth to rub Hibiclens (chlorhexidine) soap everywhere from your neck down.  (Use 1/2 of the 4 oz. bottle, save the other 1/2 for morning shower.) Do not use on your face or hair.  Be sure to get in your belly button, skin folds and creases.  Note: This soap does not lather.    Allow this soap to sit on your skin for one minute and then rinse it all off.  Dry your body with a clean fresh towel.        Morning of  your procedure:   Bathe or shower using your usual soap and shampoo.  Step away from running water, and use a clean washcloth to rub the remaining Hibiclens (chlorhexidine) soap everywhere from your neck down.  Do not use on your face or hair.  Be sure to get in your belly button, skin folds and creases.    Allow this soap to sit on your skin for one minute and then rinse it all off.  Dry your body with a clean fresh towel.      Do not apply any lotions, creams or perfumes after you bathe.     Other Education Provided   Disclosure of Physician Ownership in [facility] ([facility])    COVID-19 Information     It is important to avoid any potential exposure to COVID-19 as well as any other viruses or infections. Please contact our office at [phone] prior to your procedure date if any of the following apply:  Patient/Visitor or anyone in household has recently been diagnosed with, or has a pending test for COVID-19, regardless of vaccination status.  Patient/Visitor or anyone in household has any of the following symptoms:  fever,  cough,  sore throat, shortness of breath, difficulty breathing, nausea, vomiting, diarrhea, chills, new onset loss of taste or smell, runny nose, congestion, increased fatigue, muscle/body aches, or headache.  AFTER YOUR PROCEDURE   Following your procedure/surgery, you may be required to stay a minimum of 2 hours, possibly more. Bring an overnight bag in case you have to stay longer than expected  You may be able to go home the day of your procedure/surgery, but you will not be able to drive yourself home.   A responsible adult must drive you home. A taxi/ Uber is not acceptable. If you do not have someone to drive you home, your procedure/surgery may be cancelled.    If you are discharged on the day of surgery, you are required to have someone stay with you for 24 hours.     If needed, a prescription for post-operative medications will  be provided before you are discharged.   FOLLOW UP APPOINTMENT   You are scheduled for a follow-up appointment with Beth Medina NP on Tuesday, May 28th at 3:00 PM, Second Floor, [address].   [x]  Copy provided to patient.  [x] Patient verbalized understanding of instructions and all questions were answered.   Initials: ALC    PLEASE CALL [phone] IF YOU HAVE ANY QUESTIONS OR CONCERNS.

## 2024-05-13 DIAGNOSIS — R73.03 PRE-DIABETES: ICD-10-CM

## 2024-05-16 NOTE — TELEPHONE ENCOUNTER
RJM last OV 07/10/23. BMP last 2023.  please contact patient for f/u prior to routing to [facility].    PLAN:     I Strongly advised complete smoking cessation. Resources given to assist quitting including the followin-800-QUIT NOW.  Continue Furosemide and Jardiance for HFpEF  No change in medications.  Continues ASA for now.   Reminded to have Calcium score completed.   We encouraged modest weight loss through implementing appropriate dietary measures such as portion control and health food choices.

## 2024-05-20 RX ORDER — EMPAGLIFLOZIN 10 MG/1
TABLET, FILM COATED ORAL
Qty: 30 TABLET | Refills: 0 | Status: SHIPPED | OUTPATIENT
Start: 2024-05-20

## 2024-05-20 NOTE — TELEPHONE ENCOUNTER
5/20- second attempt. Called pt @[phone]. m informing pt to call back to schedule annual appt with GABINO.

## 2024-06-15 DIAGNOSIS — J44.9 CHRONIC OBSTRUCTIVE PULMONARY DISEASE, UNSPECIFIED COPD TYPE (HCC): ICD-10-CM

## 2024-06-15 DIAGNOSIS — J44.89 ASTHMA-COPD OVERLAP SYNDROME (HCC): ICD-10-CM

## 2024-06-15 DIAGNOSIS — R73.03 PRE-DIABETES: ICD-10-CM

## 2024-06-15 DIAGNOSIS — J45.40 MODERATE PERSISTENT ASTHMA WITHOUT COMPLICATION: ICD-10-CM

## 2024-06-17 RX ORDER — MONTELUKAST SODIUM 10 MG/1
10 TABLET ORAL DAILY
Qty: 30 TABLET | Refills: 3 | Status: SHIPPED | OUTPATIENT
Start: 2024-06-17

## 2024-06-18 DIAGNOSIS — J44.89 ASTHMA-COPD OVERLAP SYNDROME (HCC): ICD-10-CM

## 2024-06-18 RX ORDER — EMPAGLIFLOZIN 10 MG/1
TABLET, FILM COATED ORAL
Qty: 30 TABLET | Refills: 0 | Status: SHIPPED | OUTPATIENT
Start: 2024-06-18

## 2024-06-18 NOTE — TELEPHONE ENCOUNTER
Last ov: 7/10/23 GABINO  Upcoming ov: no upcoming    BMP- 1/9/23    Front- please call pt for yearly ov

## 2024-06-18 NOTE — TELEPHONE ENCOUNTER
06/18 called pt @ [phone] (Mobile)  and lvm to return call to schedule annual rjm appt. Will try again at later time

## 2024-06-19 DIAGNOSIS — J45.40 MODERATE PERSISTENT ASTHMA WITHOUT COMPLICATION: ICD-10-CM

## 2024-06-19 DIAGNOSIS — J44.9 CHRONIC OBSTRUCTIVE PULMONARY DISEASE, UNSPECIFIED COPD TYPE (HCC): ICD-10-CM

## 2024-06-20 DIAGNOSIS — J44.9 CHRONIC OBSTRUCTIVE PULMONARY DISEASE, UNSPECIFIED COPD TYPE (HCC): ICD-10-CM

## 2024-06-20 DIAGNOSIS — J45.40 MODERATE PERSISTENT ASTHMA WITHOUT COMPLICATION: ICD-10-CM

## 2024-06-20 DIAGNOSIS — J44.89 ASTHMA-COPD OVERLAP SYNDROME (HCC): ICD-10-CM

## 2024-06-20 NOTE — TELEPHONE ENCOUNTER
6/20- second attempt. Called pt @[phone]. [facility] informing pt to call back to schedule annual appt with GABINO. Next step is to send letter.

## 2024-06-21 RX ORDER — ALBUTEROL SULFATE 2.5 MG/3ML
SOLUTION RESPIRATORY (INHALATION)
Qty: 360 ML | Refills: 4 | Status: SHIPPED | OUTPATIENT
Start: 2024-06-21

## 2024-06-21 RX ORDER — ALBUTEROL SULFATE 90 UG/1
AEROSOL, METERED RESPIRATORY (INHALATION)
Qty: 18 G | Refills: 5 | Status: SHIPPED | OUTPATIENT
Start: 2024-06-21

## 2024-07-18 DIAGNOSIS — R73.03 PRE-DIABETES: ICD-10-CM

## 2024-07-18 RX ORDER — EMPAGLIFLOZIN 10 MG/1
TABLET, FILM COATED ORAL
Qty: 30 TABLET | Refills: 0 | Status: SHIPPED | OUTPATIENT
Start: 2024-07-18

## 2024-07-18 NOTE — TELEPHONE ENCOUNTER
Last ov: 07/10/23  Upcoming ov: None    CMP: 01/09/23    Front: Please schedule pt appointment    GABINO: Would you like updated labs?

## 2024-07-19 NOTE — TELEPHONE ENCOUNTER
7/19- called pt @[phone] [facility] informing pt to call back to schedule appt with GABINO.     7/10/2023 GABINO

## 2024-08-06 ENCOUNTER — TELEMEDICINE (OUTPATIENT)
Dept: PULMONOLOGY | Age: 59
End: 2024-08-06
Payer: COMMERCIAL

## 2024-08-06 DIAGNOSIS — J44.9 CHRONIC OBSTRUCTIVE PULMONARY DISEASE, UNSPECIFIED COPD TYPE (HCC): Primary | ICD-10-CM

## 2024-08-06 DIAGNOSIS — R09.02 HYPOXIA: ICD-10-CM

## 2024-08-06 DIAGNOSIS — J30.9 ALLERGIC RHINITIS, UNSPECIFIED SEASONALITY, UNSPECIFIED TRIGGER: ICD-10-CM

## 2024-08-06 PROCEDURE — 1036F TOBACCO NON-USER: CPT | Performed by: INTERNAL MEDICINE

## 2024-08-06 PROCEDURE — 3017F COLORECTAL CA SCREEN DOC REV: CPT | Performed by: INTERNAL MEDICINE

## 2024-08-06 PROCEDURE — 99214 OFFICE O/P EST MOD 30 MIN: CPT | Performed by: INTERNAL MEDICINE

## 2024-08-06 PROCEDURE — 3023F SPIROM DOC REV: CPT | Performed by: INTERNAL MEDICINE

## 2024-08-06 PROCEDURE — G8427 DOCREV CUR MEDS BY ELIG CLIN: HCPCS | Performed by: INTERNAL MEDICINE

## 2024-08-06 PROCEDURE — G8417 CALC BMI ABV UP PARAM F/U: HCPCS | Performed by: INTERNAL MEDICINE

## 2024-08-06 RX ORDER — MONTELUKAST SODIUM 10 MG/1
10 TABLET ORAL DAILY
Qty: 30 TABLET | Refills: 5 | Status: SHIPPED | OUTPATIENT
Start: 2024-08-06

## 2024-08-06 RX ORDER — ALBUTEROL SULFATE 90 UG/1
2 AEROSOL, METERED RESPIRATORY (INHALATION) EVERY 4 HOURS PRN
Qty: 36 G | Refills: 5 | Status: SHIPPED | OUTPATIENT
Start: 2024-08-06 | End: 2025-08-06

## 2024-08-06 RX ORDER — LURASIDONE HYDROCHLORIDE 60 MG/1
TABLET, FILM COATED ORAL
COMMUNITY
Start: 2024-06-26

## 2024-08-06 RX ORDER — TIOTROPIUM BROMIDE INHALATION SPRAY 1.56 UG/1
2 SPRAY, METERED RESPIRATORY (INHALATION) DAILY
Qty: 1 EACH | Refills: 5 | Status: SHIPPED | OUTPATIENT
Start: 2024-08-06

## 2024-08-06 RX ORDER — BUDESONIDE AND FORMOTEROL FUMARATE DIHYDRATE 160; 4.5 UG/1; UG/1
2 AEROSOL RESPIRATORY (INHALATION) 2 TIMES DAILY
Qty: 10.2 G | Refills: 5 | Status: SHIPPED | OUTPATIENT
Start: 2024-08-06

## 2024-08-06 NOTE — PROGRESS NOTES
[facility]                                                            Outpatient Follow Up Note    TELEHEALTH EVALUATION: Service performed was Audio/Visual (During COVID-19 public health emergency) and not a face-to-face visit         CHIEF COMPLAINT: Follow up COPD       HPI:   Doing okay   Compliant with inhaled bronchodilators.   Compliant with O2 and feels better/benefiting when using it   Uses Albuterol 2 times/day due to heat   No smoking   Sat 97%   Has not had her CT- forgot about        Past Medical History:   Diagnosis Date    Allergic rhinitis     Anxiety     Asthma     Bipolar disorder, mixed ([facility])     COPD (chronic obstructive pulmonary disease) ([facility])     COPD (chronic obstructive pulmonary disease) ([facility]) 5/7/2021    Depression     Eczema     Pulmonary nodule     Tobacco abuse        Past Surgical History:        Procedure Laterality Date    BRONCHOSCOPY      TONSILLECTOMY         Allergies:  is allergic to shrimp (diagnostic), acetaminophen-codeine, darvocet [propoxyphene n-acetaminophen], egg-derived products, percocet [oxycodone-acetaminophen], shrimp flavor, and vicodin [hydrocodone-acetaminophen].  Social History:    TOBACCO:   reports that she quit smoking about 20 months ago. Her smoking use included cigarettes. She started smoking about 35 years ago. She has a 51.0 pack-year smoking history. She has never used smokeless tobacco.  ETOH:   reports no history of alcohol use.      Family History:       Problem Relation Age of Onset    Diabetes Mother     Hypertension Father     Breast Cancer Maternal Grandmother     Asthma Neg Hx     Cancer Neg Hx     Emphysema Neg Hx     Heart Failure Neg Hx        Current Medications:    Current Outpatient Medications:     empagliflozin (JARDIANCE) 10 MG tablet, Take 1 tablet by mouth daily, Disp: 30 tablet, Rfl: 1    albuterol (PROVENTIL) (2.5 MG/3ML) 0.083% nebulizer solution, INHALE

## 2024-08-07 ENCOUNTER — TELEPHONE (OUTPATIENT)
Dept: PULMONOLOGY | Age: 59
End: 2024-08-07

## 2024-08-07 NOTE — TELEPHONE ENCOUNTER
Patient was seen yesterday. Rx was sent to [facility]    She needs refill on nebulizer solution.     Patient wants to know why his Spiriva  was lowered dose lowered.     Pharmacy is also stating that a new rescue inhaler was not sent in .

## 2024-09-16 DIAGNOSIS — J44.89 ASTHMA-COPD OVERLAP SYNDROME (HCC): ICD-10-CM

## 2024-09-17 RX ORDER — FLUTICASONE PROPIONATE 50 MCG
SPRAY, SUSPENSION (ML) NASAL
Qty: 16 G | Refills: 5 | Status: SHIPPED | OUTPATIENT
Start: 2024-09-17

## 2024-10-15 DIAGNOSIS — R73.03 PRE-DIABETES: ICD-10-CM

## 2024-10-16 RX ORDER — EMPAGLIFLOZIN 10 MG/1
10 TABLET, FILM COATED ORAL DAILY
Qty: 30 TABLET | Refills: 0 | OUTPATIENT
Start: 2024-10-16

## 2024-10-16 NOTE — TELEPHONE ENCOUNTER
Spoke with pt at [phone] she stated she has a lot going on and cannot make the appt today and will need to call our office back.

## 2024-10-16 NOTE — TELEPHONE ENCOUNTER
Last ov: 7/10/23 yeimi  Upcoming ov: none  Bmp: 7/22/24    We have attempted to reach pt six times to schedule ov. Medication denied until f/u is made.

## 2024-10-31 NOTE — PROGRESS NOTES
Handoff report given to [facility]. Care transferred. Yes - the patient is able to be screened

## 2025-02-14 DIAGNOSIS — J44.9 CHRONIC OBSTRUCTIVE PULMONARY DISEASE, UNSPECIFIED COPD TYPE (HCC): Primary | ICD-10-CM

## 2025-02-14 RX ORDER — BUDESONIDE AND FORMOTEROL FUMARATE DIHYDRATE 160; 4.5 UG/1; UG/1
2 AEROSOL RESPIRATORY (INHALATION) 2 TIMES DAILY
Qty: 10.2 G | Refills: 0 | Status: SHIPPED | OUTPATIENT
Start: 2025-02-14

## 2025-02-14 RX ORDER — TIOTROPIUM BROMIDE INHALATION SPRAY 1.56 UG/1
SPRAY, METERED RESPIRATORY (INHALATION)
Qty: 4 G | Refills: 0 | Status: SHIPPED | OUTPATIENT
Start: 2025-02-14

## 2025-02-18 ENCOUNTER — TELEPHONE (OUTPATIENT)
Dept: PULMONOLOGY | Age: 60
End: 2025-02-18

## 2025-02-18 NOTE — TELEPHONE ENCOUNTER
PA needed for  Budesonide- Formoterol Fumerate 160-4.5 MCG/ACT.    KEY---[id number]    This has been scanned into [employer]

## 2025-02-19 NOTE — TELEPHONE ENCOUNTER
Submitted PA for Budesonide-Formoterol Fumarate 160-4.5MCG/ACT aerosol   [facility] Key: [id number]  STATUS: You are submitting for a non-preferred NDC, there are preferred agents that do not require prior authorization. If a non-preferred NDC is required, please use MAR indicator when submitting request.     Follow up done daily; if no decision with in three days we will refax.  If another three days goes by with no decision will call the insurance for status.

## 2025-03-07 DIAGNOSIS — J44.89 ASTHMA-COPD OVERLAP SYNDROME (HCC): ICD-10-CM

## 2025-03-07 DIAGNOSIS — J44.9 CHRONIC OBSTRUCTIVE PULMONARY DISEASE, UNSPECIFIED COPD TYPE (HCC): ICD-10-CM

## 2025-03-07 RX ORDER — ALBUTEROL SULFATE 5 MG/ML
2.5 SOLUTION RESPIRATORY (INHALATION) EVERY 6 HOURS PRN
Qty: 60 ML | Refills: 6 | Status: CANCELLED | OUTPATIENT
Start: 2025-03-07 | End: 2026-03-07

## 2025-03-07 NOTE — TELEPHONE ENCOUNTER
Pt called in this morning stating that she had VV with [facility] about 6 months ago and was wondering if she had been scheduled for a follow-up appointment because she never received a call back. She had not been scheduled, so I was able to schedule her for a VV on 03/18/25. Upon reviewing Dr. Amor's previous plan I could see he also advised the patient to have a CT done, so I asked the patient if she would like to schedule this and she said that Dr. Amor left it up to her and she was choosing not to do it at this time. She also stated that she was almost out of all her medications that Dr. Amor prescribes her and could not wait until her visit with him to have these renewed, as she assumed they were renewed back 6 months ago. Medications pended, please sign if appropriate.

## 2025-03-09 RX ORDER — LORATADINE 10 MG/1
10 TABLET ORAL DAILY
Qty: 30 TABLET | Refills: 6 | Status: SHIPPED | OUTPATIENT
Start: 2025-03-09

## 2025-03-09 RX ORDER — TIOTROPIUM BROMIDE INHALATION SPRAY 1.56 UG/1
SPRAY, METERED RESPIRATORY (INHALATION)
Qty: 4 G | Refills: 2 | Status: SHIPPED | OUTPATIENT
Start: 2025-03-09

## 2025-03-09 RX ORDER — ALBUTEROL SULFATE 90 UG/1
2 INHALANT RESPIRATORY (INHALATION) EVERY 4 HOURS PRN
Qty: 36 G | Refills: 3 | Status: SHIPPED | OUTPATIENT
Start: 2025-03-09 | End: 2026-03-09

## 2025-03-09 RX ORDER — MONTELUKAST SODIUM 10 MG/1
10 TABLET ORAL DAILY
Qty: 30 TABLET | Refills: 2 | Status: SHIPPED | OUTPATIENT
Start: 2025-03-09

## 2025-03-09 RX ORDER — BUDESONIDE AND FORMOTEROL FUMARATE DIHYDRATE 160; 4.5 UG/1; UG/1
2 AEROSOL RESPIRATORY (INHALATION) 2 TIMES DAILY
Qty: 10.2 G | Refills: 2 | Status: SHIPPED | OUTPATIENT
Start: 2025-03-09

## 2025-03-09 RX ORDER — FLUTICASONE PROPIONATE 50 MCG
SPRAY, SUSPENSION (ML) NASAL
Qty: 16 G | Refills: 2 | Status: SHIPPED | OUTPATIENT
Start: 2025-03-09

## 2025-03-18 ENCOUNTER — TELEMEDICINE (OUTPATIENT)
Dept: PULMONOLOGY | Age: 60
End: 2025-03-18
Payer: COMMERCIAL

## 2025-03-18 DIAGNOSIS — J40 TRACHEOBRONCHITIS: ICD-10-CM

## 2025-03-18 DIAGNOSIS — R09.02 HYPOXIA: ICD-10-CM

## 2025-03-18 DIAGNOSIS — J44.1 COPD EXACERBATION (HCC): Primary | ICD-10-CM

## 2025-03-18 PROCEDURE — 99448 NTRPROF PH1/NTRNET/EHR 21-30: CPT | Performed by: INTERNAL MEDICINE

## 2025-03-18 RX ORDER — ALBUTEROL SULFATE 0.63 MG/3ML
1 SOLUTION RESPIRATORY (INHALATION) EVERY 6 HOURS PRN
Qty: 270 ML | Refills: 3 | Status: SHIPPED | OUTPATIENT
Start: 2025-03-18

## 2025-03-18 RX ORDER — DOXYCYCLINE HYCLATE 100 MG
100 TABLET ORAL 2 TIMES DAILY
Qty: 10 TABLET | Refills: 0 | Status: SHIPPED | OUTPATIENT
Start: 2025-03-18 | End: 2025-03-23

## 2025-03-18 RX ORDER — TIOTROPIUM BROMIDE 18 UG/1
18 CAPSULE ORAL; RESPIRATORY (INHALATION) DAILY
Qty: 90 CAPSULE | Refills: 1 | Status: SHIPPED | OUTPATIENT
Start: 2025-03-18

## 2025-03-18 RX ORDER — LOVASTATIN 20 MG/1
20 TABLET ORAL NIGHTLY
COMMUNITY

## 2025-03-18 RX ORDER — PREDNISONE 10 MG/1
TABLET ORAL
Qty: 30 TABLET | Refills: 0 | Status: SHIPPED | OUTPATIENT
Start: 2025-03-18

## 2025-03-18 RX ORDER — ALBUTEROL SULFATE 90 UG/1
2 INHALANT RESPIRATORY (INHALATION) 4 TIMES DAILY PRN
Qty: 18 G | Refills: 0 | Status: SHIPPED | OUTPATIENT
Start: 2025-03-18

## 2025-03-18 NOTE — PROGRESS NOTES
continue with smoking cessation   Follow up in 3 months or sooner if needed           Documentation:  I communicated with the patient and/or health care decision maker about .   Details of this discussion including any medical advice provided:     Total Time: minutes: 21-30 minutes    Pamela Mortensen was evaluated through a synchronous (real-time) audio encounter. Patient identification was verified at the start of the visit. She (or guardian if applicable) is aware that this is a billable service, which includes applicable co-pays. This visit was conducted with the patient's (and/or legal guardian's) verbal consent. She has not had a related appointment within my department in the past 7 days or scheduled within the next 24 hours.   The patient was located at Home: [address] [place] [address] [address] [place] [id number].  The provider was located at Facility (Appt Dept): [address] [place] [address], [address]  [place] [address].  Confirm you are appropriately licensed, registered, or certified to deliver care in the state where the patient is located as indicated above. If you are not or unsure, please re-schedule the visit: Yes, I confirm.     Note: not billable if this call serves to triage the patient into an appointment for the relevant concern    Pamela Mortensen is a 59 y.o. female evaluated via telephone on 3/18/2025 for COPD (6 mo fu)    .        Derick Amor MD

## 2025-03-19 ENCOUNTER — TELEPHONE (OUTPATIENT)
Dept: PULMONOLOGY | Age: 60
End: 2025-03-19

## 2025-03-19 DIAGNOSIS — J44.9 CHRONIC OBSTRUCTIVE PULMONARY DISEASE, UNSPECIFIED COPD TYPE (HCC): Primary | ICD-10-CM

## 2025-03-19 NOTE — TELEPHONE ENCOUNTER
Submitted PA for Albuterol Sulfate 0.63MG/3ML nebulizer solution   Via [facility] Key: [id number]  STATUS: Electronic prior authorization not supported as NDC is not payable.     Follow up done daily; if no decision with in three days we will refax.  If another three days goes by with no decision will call the insurance for status.

## 2025-03-31 ENCOUNTER — HOSPITAL ENCOUNTER (OUTPATIENT)
Dept: WOMENS IMAGING | Age: 60
Discharge: HOME | End: 2025-03-31
Payer: COMMERCIAL

## 2025-03-31 VITALS — HEIGHT: 67 IN | WEIGHT: 240 LBS | BODY MASS INDEX: 37.67 KG/M2

## 2025-03-31 DIAGNOSIS — Z12.31 BREAST CANCER SCREENING BY MAMMOGRAM: ICD-10-CM

## 2025-03-31 PROCEDURE — 77063 BREAST TOMOSYNTHESIS BI: CPT

## 2025-04-04 NOTE — TELEPHONE ENCOUNTER
I called pt and had to leave VM. Pt needs to call their insurance and see what inhaler is similar but covered.

## 2025-04-11 RX ORDER — ALBUTEROL SULFATE 90 UG/1
2 INHALANT RESPIRATORY (INHALATION) 4 TIMES DAILY PRN
Qty: 18 G | Refills: 0 | Status: SHIPPED | OUTPATIENT
Start: 2025-04-11

## 2025-06-02 DIAGNOSIS — J44.9 CHRONIC OBSTRUCTIVE PULMONARY DISEASE, UNSPECIFIED COPD TYPE (HCC): ICD-10-CM

## 2025-06-02 DIAGNOSIS — J44.89 ASTHMA-COPD OVERLAP SYNDROME (HCC): ICD-10-CM

## 2025-06-03 RX ORDER — TIOTROPIUM BROMIDE INHALATION SPRAY 1.56 UG/1
SPRAY, METERED RESPIRATORY (INHALATION)
Qty: 4 G | Refills: 0 | Status: SHIPPED | OUTPATIENT
Start: 2025-06-03

## 2025-06-03 RX ORDER — BUDESONIDE AND FORMOTEROL FUMARATE DIHYDRATE 160; 4.5 UG/1; UG/1
AEROSOL RESPIRATORY (INHALATION)
Qty: 10.2 G | Refills: 0 | Status: SHIPPED | OUTPATIENT
Start: 2025-06-03

## 2025-06-03 RX ORDER — MONTELUKAST SODIUM 10 MG/1
10 TABLET ORAL DAILY
Qty: 30 TABLET | Refills: 0 | Status: SHIPPED | OUTPATIENT
Start: 2025-06-03

## 2025-06-16 ENCOUNTER — OFFICE VISIT (OUTPATIENT)
Dept: CARDIOLOGY CLINIC | Age: 60
End: 2025-06-16
Payer: COMMERCIAL

## 2025-06-16 VITALS
HEIGHT: 67 IN | OXYGEN SATURATION: 89 % | DIASTOLIC BLOOD PRESSURE: 62 MMHG | SYSTOLIC BLOOD PRESSURE: 106 MMHG | HEART RATE: 59 BPM | BODY MASS INDEX: 41.2 KG/M2 | WEIGHT: 262.5 LBS

## 2025-06-16 DIAGNOSIS — Z72.0 TOBACCO ABUSE: ICD-10-CM

## 2025-06-16 DIAGNOSIS — R06.02 SHORTNESS OF BREATH: Primary | ICD-10-CM

## 2025-06-16 DIAGNOSIS — E78.2 MIXED HYPERLIPIDEMIA: ICD-10-CM

## 2025-06-16 DIAGNOSIS — I50.20 SYSTOLIC CONGESTIVE HEART FAILURE, UNSPECIFIED HF CHRONICITY (HCC): ICD-10-CM

## 2025-06-16 DIAGNOSIS — I50.32 CHRONIC DIASTOLIC CHF (CONGESTIVE HEART FAILURE) (HCC): ICD-10-CM

## 2025-06-16 DIAGNOSIS — I27.20 PULMONARY HYPERTENSION (HCC): ICD-10-CM

## 2025-06-16 PROCEDURE — 3017F COLORECTAL CA SCREEN DOC REV: CPT | Performed by: INTERNAL MEDICINE

## 2025-06-16 PROCEDURE — 99214 OFFICE O/P EST MOD 30 MIN: CPT | Performed by: INTERNAL MEDICINE

## 2025-06-16 PROCEDURE — G8427 DOCREV CUR MEDS BY ELIG CLIN: HCPCS | Performed by: INTERNAL MEDICINE

## 2025-06-16 PROCEDURE — 4004F PT TOBACCO SCREEN RCVD TLK: CPT | Performed by: INTERNAL MEDICINE

## 2025-06-16 PROCEDURE — G2211 COMPLEX E/M VISIT ADD ON: HCPCS | Performed by: INTERNAL MEDICINE

## 2025-06-16 PROCEDURE — G8417 CALC BMI ABV UP PARAM F/U: HCPCS | Performed by: INTERNAL MEDICINE

## 2025-06-16 NOTE — PATIENT INSTRUCTIONS
I Strongly advised complete smoking cessation. Resources given to assist quitting including the followin-QUIT NOW.  Repeat echocardiogram  ~Call [facility] scheduling at [phone] to schedule testing    We discussed a CT calcium score and stress test which she declines   Continue Lasix prescribed by Ran Madera RN, NP   Labs- Fasting lipids         Follow up in 4 months

## 2025-06-18 ENCOUNTER — OFFICE VISIT (OUTPATIENT)
Age: 60
End: 2025-06-18

## 2025-06-18 VITALS
SYSTOLIC BLOOD PRESSURE: 122 MMHG | OXYGEN SATURATION: 93 % | TEMPERATURE: 97.3 F | HEART RATE: 60 BPM | DIASTOLIC BLOOD PRESSURE: 74 MMHG

## 2025-06-18 DIAGNOSIS — L03.90 CELLULITIS, UNSPECIFIED CELLULITIS SITE: Primary | ICD-10-CM

## 2025-06-18 RX ORDER — CEPHALEXIN 500 MG/1
500 CAPSULE ORAL 4 TIMES DAILY
Qty: 28 CAPSULE | Refills: 0 | Status: SHIPPED | OUTPATIENT
Start: 2025-06-18 | End: 2025-06-25

## 2025-06-18 NOTE — PROGRESS NOTES
Pamela Mortensen (:  1965) is a 59 y.o. female,  here for evaluation of the following chief complaint(s): Ear Pain (Pt states redness on right ear lobe and states there is a knot on it but no head/X few days/Itchy and red and swollen)    Pamela Mortensen is a: New patient.   Last Urgent Care Visit: Visit date not found  I have reviewed the patient's medications and basic medical history; see Medication Reconciliation.    ASSESSMENT/PLAN:  Diagnosis:     ICD-10-CM    1. Cellulitis, unspecified cellulitis site  L03.90 cephALEXin (KEFLEX) 500 MG capsule             Medical Decision Making:   Patient was seen at Urgent Care today for erythema and pruritus to the lower edge of the left ear lobe as well as around the skin junction of the ear lobe to the face.  On exam there is no palpable abscess.  Area is largely nontender and there is no significant induration.  It does not extend to to the rest of the earlobe or extend to the face.    This is possibly allergic versus infectious.  Patient is prescribed Keflex for antibiotic coverage of any possible infectious process.  She will take over-the-counter Zyrtec or Claritin for possible allergic component.    She is advised close monitoring particularly increased redness to the external ear and the more cartilaginous areas.  She will go to ED if this occurs.    Patient was discharged home with follow-up and return precautions.    Results:  No results found for any visits on 25.       SUBJECTIVE/OBJECTIVE:  HPI  This is a 59 y.o. female that presents today with complaint of erythema and pruritus to the base of the right earlobe and face.  Patient denies any known trauma.  About 2 to 3 days ago she started noticing some redness to the very lower edge of the right earlobe.  This also seemed to extend to the skin of the face at the site of earlobe attachment to the face.  Patient reports there is pruritic and feels mildly warm but is not tender or painful.  The area has

## 2025-06-18 NOTE — PATIENT INSTRUCTIONS
Infectious vs allergic.     You have been prescribed an antibiotic to cover for any infectious component.   Take antibiotic as prescribed.     Warm compresses.   Over-the-Counter antihistamine such as zyrtec or claritin.     Monitor for worsening or enlarging area of redness or onset of pain or tenderness.     Follow-up with primary care.     Return to ED as needed

## 2025-06-24 DIAGNOSIS — J44.89 ASTHMA-COPD OVERLAP SYNDROME (HCC): ICD-10-CM

## 2025-06-24 DIAGNOSIS — J44.9 CHRONIC OBSTRUCTIVE PULMONARY DISEASE, UNSPECIFIED COPD TYPE (HCC): ICD-10-CM

## 2025-06-26 RX ORDER — MONTELUKAST SODIUM 10 MG/1
10 TABLET ORAL DAILY
Qty: 30 TABLET | Refills: 0 | Status: SHIPPED | OUTPATIENT
Start: 2025-06-26

## 2025-06-26 RX ORDER — TIOTROPIUM BROMIDE INHALATION SPRAY 1.56 UG/1
SPRAY, METERED RESPIRATORY (INHALATION)
Qty: 4 G | Refills: 0 | Status: SHIPPED | OUTPATIENT
Start: 2025-06-26

## 2025-06-26 RX ORDER — BUDESONIDE AND FORMOTEROL FUMARATE DIHYDRATE 160; 4.5 UG/1; UG/1
AEROSOL RESPIRATORY (INHALATION)
Qty: 10.2 G | Refills: 0 | Status: SHIPPED | OUTPATIENT
Start: 2025-06-26

## 2025-06-28 DIAGNOSIS — J44.9 CHRONIC OBSTRUCTIVE PULMONARY DISEASE, UNSPECIFIED COPD TYPE (HCC): ICD-10-CM

## 2025-06-30 RX ORDER — BUDESONIDE AND FORMOTEROL FUMARATE DIHYDRATE 160; 4.5 UG/1; UG/1
AEROSOL RESPIRATORY (INHALATION)
Qty: 10.2 G | Refills: 0 | Status: SHIPPED | OUTPATIENT
Start: 2025-06-30

## 2025-06-30 RX ORDER — TIOTROPIUM BROMIDE INHALATION SPRAY 1.56 UG/1
SPRAY, METERED RESPIRATORY (INHALATION)
Qty: 4 G | Refills: 0 | Status: SHIPPED | OUTPATIENT
Start: 2025-06-30

## 2025-08-07 DIAGNOSIS — J44.9 CHRONIC OBSTRUCTIVE PULMONARY DISEASE, UNSPECIFIED COPD TYPE (HCC): ICD-10-CM

## 2025-08-07 RX ORDER — TIOTROPIUM BROMIDE INHALATION SPRAY 1.56 UG/1
SPRAY, METERED RESPIRATORY (INHALATION)
Qty: 4 G | Refills: 0 | Status: SHIPPED | OUTPATIENT
Start: 2025-08-07

## 2025-08-07 RX ORDER — BUDESONIDE AND FORMOTEROL FUMARATE DIHYDRATE 160; 4.5 UG/1; UG/1
AEROSOL RESPIRATORY (INHALATION)
Qty: 10.2 G | Refills: 0 | Status: SHIPPED | OUTPATIENT
Start: 2025-08-07